# Patient Record
Sex: MALE | Race: WHITE | Employment: FULL TIME | URBAN - METROPOLITAN AREA
[De-identification: names, ages, dates, MRNs, and addresses within clinical notes are randomized per-mention and may not be internally consistent; named-entity substitution may affect disease eponyms.]

---

## 2017-01-13 RX ORDER — LEVOTHYROXINE SODIUM 112 UG/1
TABLET ORAL
Qty: 30 TABLET | Refills: 3 | Status: SHIPPED | OUTPATIENT
Start: 2017-01-13 | End: 2017-05-10 | Stop reason: SDUPTHER

## 2017-01-13 RX ORDER — METOPROLOL SUCCINATE 50 MG/1
TABLET, EXTENDED RELEASE ORAL
Qty: 30 TABLET | Refills: 0 | Status: SHIPPED | OUTPATIENT
Start: 2017-01-13 | End: 2017-02-15 | Stop reason: SDUPTHER

## 2017-02-16 RX ORDER — METOPROLOL SUCCINATE 50 MG/1
TABLET, EXTENDED RELEASE ORAL
Qty: 30 TABLET | Refills: 5 | Status: SHIPPED | OUTPATIENT
Start: 2017-02-16 | End: 2017-08-08 | Stop reason: SDUPTHER

## 2017-03-13 DIAGNOSIS — E78.00 PURE HYPERCHOLESTEROLEMIA: ICD-10-CM

## 2017-03-14 RX ORDER — ATORVASTATIN CALCIUM 20 MG/1
TABLET, FILM COATED ORAL
Qty: 30 TABLET | Refills: 5 | Status: SHIPPED | OUTPATIENT
Start: 2017-03-14 | End: 2017-09-11 | Stop reason: SDUPTHER

## 2017-05-12 RX ORDER — LEVOTHYROXINE SODIUM 112 UG/1
TABLET ORAL
Qty: 30 TABLET | Refills: 2 | Status: SHIPPED | OUTPATIENT
Start: 2017-05-12 | End: 2017-08-08 | Stop reason: SDUPTHER

## 2017-06-05 LAB
CHOLESTEROL, TOTAL: 151 MG/DL
CHOLESTEROL/HDL RATIO: 4.7
HDLC SERPL-MCNC: 32 MG/DL (ref 35–70)
LDL CHOLESTEROL CALCULATED: 105 MG/DL (ref 0–160)
PROSTATE SPECIFIC ANTIGEN: 2.45 NG/ML
TRIGL SERPL-MCNC: 71 MG/DL
TSH SERPL DL<=0.05 MIU/L-ACNC: 1.17 UIU/ML
VLDLC SERPL CALC-MCNC: ABNORMAL MG/DL

## 2017-06-06 DIAGNOSIS — E78.00 PURE HYPERCHOLESTEROLEMIA: ICD-10-CM

## 2017-06-06 DIAGNOSIS — E03.9 ACQUIRED HYPOTHYROIDISM: ICD-10-CM

## 2017-06-06 DIAGNOSIS — Z13.9 SCREENING: ICD-10-CM

## 2017-06-09 ENCOUNTER — OFFICE VISIT (OUTPATIENT)
Dept: INTERNAL MEDICINE CLINIC | Age: 62
End: 2017-06-09
Payer: COMMERCIAL

## 2017-06-09 VITALS
HEART RATE: 60 BPM | OXYGEN SATURATION: 98 % | DIASTOLIC BLOOD PRESSURE: 70 MMHG | SYSTOLIC BLOOD PRESSURE: 120 MMHG | BODY MASS INDEX: 28.75 KG/M2 | RESPIRATION RATE: 16 BRPM | HEIGHT: 75 IN | WEIGHT: 231.2 LBS

## 2017-06-09 DIAGNOSIS — I10 ESSENTIAL HYPERTENSION: ICD-10-CM

## 2017-06-09 DIAGNOSIS — E78.49 OTHER HYPERLIPIDEMIA: ICD-10-CM

## 2017-06-09 DIAGNOSIS — B34.9 VIRAL SYNDROME: Primary | ICD-10-CM

## 2017-06-09 DIAGNOSIS — E29.1 HYPOGONADISM IN MALE: ICD-10-CM

## 2017-06-09 DIAGNOSIS — Z12.11 COLON CANCER SCREENING: ICD-10-CM

## 2017-06-09 DIAGNOSIS — I83.813 VARICOSE VEINS OF BOTH LOWER EXTREMITIES WITH PAIN: ICD-10-CM

## 2017-06-09 LAB
CONTROL: NORMAL
HEMOCCULT STL QL: NEGATIVE

## 2017-06-09 PROCEDURE — 82274 ASSAY TEST FOR BLOOD FECAL: CPT | Performed by: FAMILY MEDICINE

## 2017-06-09 PROCEDURE — 99214 OFFICE O/P EST MOD 30 MIN: CPT | Performed by: FAMILY MEDICINE

## 2017-06-09 ASSESSMENT — ENCOUNTER SYMPTOMS
RESPIRATORY NEGATIVE: 1
ALLERGIC/IMMUNOLOGIC NEGATIVE: 1
EYES NEGATIVE: 1

## 2017-06-21 RX ORDER — VALSARTAN AND HYDROCHLOROTHIAZIDE 160; 12.5 MG/1; MG/1
TABLET, FILM COATED ORAL
Qty: 30 TABLET | Refills: 5 | Status: SHIPPED | OUTPATIENT
Start: 2017-06-21 | End: 2017-12-14 | Stop reason: SDUPTHER

## 2017-07-12 ENCOUNTER — OFFICE VISIT (OUTPATIENT)
Dept: SURGERY | Age: 62
End: 2017-07-12
Payer: COMMERCIAL

## 2017-07-12 VITALS
WEIGHT: 225.6 LBS | BODY MASS INDEX: 28.05 KG/M2 | TEMPERATURE: 96.8 F | HEART RATE: 61 BPM | HEIGHT: 75 IN | DIASTOLIC BLOOD PRESSURE: 84 MMHG | SYSTOLIC BLOOD PRESSURE: 127 MMHG

## 2017-07-12 DIAGNOSIS — I83.93 VARICOSE VEINS OF BOTH LOWER EXTREMITIES: Primary | ICD-10-CM

## 2017-07-12 PROCEDURE — 99203 OFFICE O/P NEW LOW 30 MIN: CPT | Performed by: SURGERY

## 2017-07-13 DIAGNOSIS — I10 ESSENTIAL HYPERTENSION: ICD-10-CM

## 2017-07-13 RX ORDER — POTASSIUM CHLORIDE 750 MG/1
TABLET, FILM COATED, EXTENDED RELEASE ORAL
Qty: 60 TABLET | Refills: 0 | Status: SHIPPED | OUTPATIENT
Start: 2017-07-13 | End: 2017-09-11 | Stop reason: SDUPTHER

## 2017-08-08 RX ORDER — LEVOTHYROXINE SODIUM 112 UG/1
TABLET ORAL
Qty: 30 TABLET | Refills: 2 | Status: SHIPPED | OUTPATIENT
Start: 2017-08-08 | End: 2017-11-10 | Stop reason: SDUPTHER

## 2017-08-08 RX ORDER — METOPROLOL SUCCINATE 50 MG/1
TABLET, EXTENDED RELEASE ORAL
Qty: 30 TABLET | Refills: 2 | Status: SHIPPED | OUTPATIENT
Start: 2017-08-08 | End: 2017-11-10 | Stop reason: SDUPTHER

## 2017-09-11 DIAGNOSIS — I10 ESSENTIAL HYPERTENSION: ICD-10-CM

## 2017-09-11 DIAGNOSIS — E78.00 PURE HYPERCHOLESTEROLEMIA: ICD-10-CM

## 2017-09-11 RX ORDER — ATORVASTATIN CALCIUM 20 MG/1
TABLET, FILM COATED ORAL
Qty: 30 TABLET | Refills: 3 | Status: SHIPPED | OUTPATIENT
Start: 2017-09-11 | End: 2018-01-12 | Stop reason: SDUPTHER

## 2017-09-11 RX ORDER — POTASSIUM CHLORIDE 750 MG/1
TABLET, FILM COATED, EXTENDED RELEASE ORAL
Qty: 60 TABLET | Refills: 3 | Status: SHIPPED | OUTPATIENT
Start: 2017-09-11 | End: 2018-02-05 | Stop reason: SDUPTHER

## 2017-10-11 ENCOUNTER — OFFICE VISIT (OUTPATIENT)
Dept: INTERNAL MEDICINE CLINIC | Age: 62
End: 2017-10-11
Payer: COMMERCIAL

## 2017-10-11 VITALS
BODY MASS INDEX: 28.03 KG/M2 | HEIGHT: 75 IN | TEMPERATURE: 97.6 F | HEART RATE: 72 BPM | SYSTOLIC BLOOD PRESSURE: 116 MMHG | DIASTOLIC BLOOD PRESSURE: 64 MMHG | WEIGHT: 225.4 LBS | RESPIRATION RATE: 16 BRPM

## 2017-10-11 DIAGNOSIS — I87.2 VENOUS INSUFFICIENCY: ICD-10-CM

## 2017-10-11 DIAGNOSIS — E03.9 ACQUIRED HYPOTHYROIDISM: ICD-10-CM

## 2017-10-11 DIAGNOSIS — I83.893 VARICOSE VEINS OF BOTH LEGS WITH EDEMA: ICD-10-CM

## 2017-10-11 DIAGNOSIS — E29.1 HYPOGONADISM IN MALE: ICD-10-CM

## 2017-10-11 DIAGNOSIS — I10 ESSENTIAL HYPERTENSION: ICD-10-CM

## 2017-10-11 DIAGNOSIS — R60.0 LOCALIZED EDEMA: Primary | ICD-10-CM

## 2017-10-11 DIAGNOSIS — E78.00 PURE HYPERCHOLESTEROLEMIA: ICD-10-CM

## 2017-10-11 PROCEDURE — 99214 OFFICE O/P EST MOD 30 MIN: CPT | Performed by: FAMILY MEDICINE

## 2017-10-11 RX ORDER — FUROSEMIDE 20 MG/1
20 TABLET ORAL DAILY
Qty: 5 TABLET | Refills: 0 | Status: SHIPPED | OUTPATIENT
Start: 2017-10-11 | End: 2018-08-14 | Stop reason: CLARIF

## 2017-10-11 ASSESSMENT — ENCOUNTER SYMPTOMS
CHEST TIGHTNESS: 0
SHORTNESS OF BREATH: 0
ANAL BLEEDING: 0
COUGH: 0
WHEEZING: 0
COLOR CHANGE: 0
CHOKING: 0
STRIDOR: 0
APNEA: 0
CONSTIPATION: 0
TROUBLE SWALLOWING: 0
BACK PAIN: 0
FACIAL SWELLING: 0
SORE THROAT: 0
ABDOMINAL PAIN: 0
ABDOMINAL DISTENTION: 0
EYE DISCHARGE: 0
EYE PAIN: 0
EYE REDNESS: 0

## 2017-10-11 NOTE — PROGRESS NOTES
[address] [place] [address] ADULT PRIMARY CARE  [address] [place] [address] [facility]  [address] [place] [address]  [id number]  Dept: [phone]  Dept Fax: [phone]    Cristiane Bah is a 58 y.o. male who presents today for his medical conditions/complaints as noted below. Cristiane Bah is c/o of   Chief Complaint   Patient presents with    Leg Swelling     left leg swelling since meds were changed by pharmacy was on Diovan HCT and switched to valsartan HCT.y    Other     the med switching occurred in Feb 2017    Other     frustruated about difficulty getting in to see Dr Ethan Christianson       HPI:     HPI     This patient is a 59 yo white male with hypertension. Recently, he has noted bilateral pedal edema. This has been present for over 7 months. He has had no symptoms of CHF. He has a history of normal kidney function. He does have significant, bilateral varicose veins. I will get a BNP and BMP. I will also order a venous doppler study and refer him over to vascular. Hemoglobin A1C (%)   Date Value   12/07/2016 5.6             ( goal A1C is < 7)   No results found for: LABMICR  LDL Calculated (mg/dL)   Date Value   06/05/2017 105   12/04/2015 135       (goal LDL is <100)   No results found for: AST, ALT, BUN  BP Readings from Last 3 Encounters:   10/11/17 116/64   07/12/17 127/84   06/09/17 120/70          (goal 120/80)    Past Medical History:   Diagnosis Date    Hyperlipidemia     Hypertension     Thyroid disease       History reviewed. No pertinent surgical history. History reviewed. No pertinent family history.     Social History   Substance Use Topics    Smoking status: Former Smoker    Smokeless tobacco: Never Used    Alcohol use Yes      Comment: moderate      Current Outpatient Prescriptions   Medication Sig Dispense Refill    furosemide (LASIX) 20 MG tablet Take 1 tablet by mouth daily 5 tablet 0    atorvastatin (LIPITOR) 20 MG tablet TAKE 1 TABLET BY MOUTH DAILY 30 tablet 3    potassium chloride (KLOR-CON) 10 MEQ extended release tablet TAKE 2 TABLETS BY MOUTH DAILY (Patient taking differently: takes 1 daily) 60 tablet 3    metoprolol succinate (TOPROL XL) 50 MG extended release tablet TAKE 1 TABLET BY MOUTH DAILY 30 tablet 2    levothyroxine (SYNTHROID) 112 MCG tablet TAKE 1 TABLET BY MOUTH DAILY 30 tablet 2    valsartan-hydrochlorothiazide (DIOVAN-HCT) 160-12.5 MG per tablet TAKE 1 TABLET BY MOUTH DAILY 30 tablet 5    sildenafil (VIAGRA) 100 MG tablet Take 1 tablet by mouth as needed for Erectile Dysfunction 30 tablet 3     No current facility-administered medications for this visit. No Known Allergies    Health Maintenance   Topic Date Due    Hepatitis C screen  1955    HIV screen  07/28/1970    Zostavax vaccine  07/28/2015    Flu vaccine (1) 12/20/2017 (Originally 9/1/2017)    Colon Cancer Screen FIT/FOBT  06/09/2018    Diabetes screen  12/07/2019    Lipid screen  06/05/2022    DTaP/Tdap/Td vaccine (2 - Td) 06/03/2026       Subjective:      Review of Systems   Constitutional: Negative for activity change, appetite change, chills, diaphoresis, fatigue, fever and unexpected weight change. HENT: Negative for ear discharge, facial swelling, hearing loss, nosebleeds, postnasal drip, sneezing, sore throat, tinnitus and trouble swallowing. Eyes: Negative for pain, discharge and redness. Respiratory: Negative for apnea, cough, choking, chest tightness, shortness of breath, wheezing and stridor. Cardiovascular: Positive for leg swelling. Negative for chest pain and palpitations. Gastrointestinal: Negative for abdominal distention, abdominal pain, anal bleeding and constipation. Endocrine: Negative for cold intolerance, heat intolerance, polydipsia and polyphagia. Genitourinary: Negative for difficulty urinating, flank pain and frequency. Musculoskeletal: Negative for arthralgias, back pain and neck pain. Skin: Negative for color change and rash.    Neurological: Negative for dizziness, seizures, numbness and headaches. Hematological: Negative for adenopathy. Does not bruise/bleed easily. Psychiatric/Behavioral: Negative for behavioral problems and confusion. Objective:     Physical Exam   Constitutional: He is oriented to person, place, and time. He appears well-developed and well-nourished. HENT:   Head: Normocephalic and atraumatic. Right Ear: External ear normal.   Left Ear: External ear normal.   Nose: Nose normal.   Mouth/Throat: Oropharynx is clear and moist.   Eyes: Conjunctivae and EOM are normal. Pupils are equal, round, and reactive to light. Right eye exhibits no discharge. Left eye exhibits no discharge. No scleral icterus. Neck: Normal range of motion. Neck supple. No tracheal deviation present. No thyromegaly present. Cardiovascular: Normal rate and regular rhythm. Exam reveals no gallop and no friction rub. No murmur heard. Pulmonary/Chest: Effort normal and breath sounds normal. No respiratory distress. He has no wheezes. He has no rales. Abdominal: Soft. Bowel sounds are normal. He exhibits no distension and no mass. There is no tenderness. There is no rebound and no guarding. Musculoskeletal: Normal range of motion. Neurological: He is alert and oriented to person, place, and time. Skin: Skin is warm and dry. Psychiatric: He has a normal mood and affect. His behavior is normal. Judgment and thought content normal.   Vitals reviewed. /64 (Site: Right Arm, Position: Sitting, Cuff Size: Medium Adult)   Pulse 72   Temp 97.6 °F (36.4 °C) (Oral)   Resp 16   Ht 6' 3\" (1.905 m)   Wt 225 lb 6.4 oz (102.2 kg)   BMI 28.17 kg/m²     Assessment:      1. Localized edema  50659 - AL Duplex Extrem Venous, Bilat    Brain Natriuretic Peptide    Basic Metabolic Panel    Arminda Tee M.D. - Ray Barbour MD    furosemide (LASIX) 20 MG tablet   2. Pure hypercholesterolemia     3. Essential hypertension     4.  Acquired

## 2017-10-11 NOTE — PROGRESS NOTES
Visit Information    Have you changed or started any medications since your last visit including any over-the-counter medicines, vitamins, or herbal medicines? no   Are you having any side effects from any of your medications? -  See notes  Have you stopped taking any of your medications? Is so, why? -  no    Have you seen any other physician or provider since your last visit? No  Have you had any other diagnostic tests since your last visit? No  Have you been seen in the emergency room and/or had an admission to a hospital since we last saw you? No  Have you had your routine dental cleaning in the past 6 months? yes -     Have you activated your [employer] account? If not, what are your barriers?  No:      Patient Care Team:  Johnna Moses MD as PCP - General (Family Medicine)  Jeevan Ramsey MD as Consulting Physician (Gastroenterology)    Medical History Review  Past Medical, Family, and Social History reviewed and does contribute to the patient presenting condition    Health Maintenance   Topic Date Due    Hepatitis C screen  1955    HIV screen  07/28/1970    Zostavax vaccine  07/28/2015    Flu vaccine (1) 12/20/2017 (Originally 9/1/2017)    Colon Cancer Screen FIT/FOBT  06/09/2018    Diabetes screen  12/07/2019    Lipid screen  06/05/2022    DTaP/Tdap/Td vaccine (2 - Td) 06/03/2026

## 2017-10-16 ENCOUNTER — HOSPITAL ENCOUNTER (OUTPATIENT)
Dept: VASCULAR LAB | Age: 62
Discharge: HOME | End: 2017-10-16
Payer: COMMERCIAL

## 2017-10-16 DIAGNOSIS — I83.893 VARICOSE VEINS OF BOTH LEGS WITH EDEMA: ICD-10-CM

## 2017-10-16 DIAGNOSIS — R60.0 LOCALIZED EDEMA: Primary | ICD-10-CM

## 2017-10-16 DIAGNOSIS — I87.2 VENOUS INSUFFICIENCY: ICD-10-CM

## 2017-10-16 PROCEDURE — 93970 EXTREMITY STUDY: CPT

## 2017-11-10 RX ORDER — METOPROLOL SUCCINATE 50 MG/1
TABLET, EXTENDED RELEASE ORAL
Qty: 30 TABLET | Refills: 2 | Status: SHIPPED | OUTPATIENT
Start: 2017-11-10 | End: 2018-02-12 | Stop reason: SDUPTHER

## 2017-11-10 RX ORDER — LEVOTHYROXINE SODIUM 112 UG/1
TABLET ORAL
Qty: 30 TABLET | Refills: 2 | Status: SHIPPED | OUTPATIENT
Start: 2017-11-10 | End: 2018-02-12 | Stop reason: SDUPTHER

## 2017-12-14 RX ORDER — VALSARTAN AND HYDROCHLOROTHIAZIDE 160; 12.5 MG/1; MG/1
TABLET, FILM COATED ORAL
Qty: 30 TABLET | Refills: 2 | Status: SHIPPED | OUTPATIENT
Start: 2017-12-14 | End: 2018-03-13 | Stop reason: SDUPTHER

## 2018-01-12 DIAGNOSIS — E78.00 PURE HYPERCHOLESTEROLEMIA: ICD-10-CM

## 2018-01-12 RX ORDER — ATORVASTATIN CALCIUM 20 MG/1
20 TABLET, FILM COATED ORAL DAILY
Qty: 30 TABLET | Refills: 3 | Status: SHIPPED | OUTPATIENT
Start: 2018-01-12 | End: 2018-05-16 | Stop reason: SDUPTHER

## 2018-02-05 DIAGNOSIS — I10 ESSENTIAL HYPERTENSION: ICD-10-CM

## 2018-02-05 RX ORDER — POTASSIUM CHLORIDE 750 MG/1
TABLET, FILM COATED, EXTENDED RELEASE ORAL
Qty: 30 TABLET | Refills: 0 | Status: SHIPPED | OUTPATIENT
Start: 2018-02-05 | End: 2018-03-13 | Stop reason: SDUPTHER

## 2018-02-12 RX ORDER — LEVOTHYROXINE SODIUM 112 UG/1
TABLET ORAL
Qty: 30 TABLET | Refills: 1 | Status: SHIPPED | OUTPATIENT
Start: 2018-02-12 | End: 2018-03-13 | Stop reason: SDUPTHER

## 2018-02-12 RX ORDER — METOPROLOL SUCCINATE 50 MG/1
TABLET, EXTENDED RELEASE ORAL
Qty: 30 TABLET | Refills: 1 | Status: SHIPPED | OUTPATIENT
Start: 2018-02-12 | End: 2018-03-13 | Stop reason: SDUPTHER

## 2018-03-13 DIAGNOSIS — I10 ESSENTIAL HYPERTENSION: ICD-10-CM

## 2018-03-13 RX ORDER — LEVOTHYROXINE SODIUM 112 UG/1
TABLET ORAL
Qty: 30 TABLET | Refills: 1 | Status: SHIPPED | OUTPATIENT
Start: 2018-03-13 | End: 2018-05-07 | Stop reason: SDUPTHER

## 2018-03-13 RX ORDER — METOPROLOL SUCCINATE 50 MG/1
TABLET, EXTENDED RELEASE ORAL
Qty: 30 TABLET | Refills: 0 | Status: SHIPPED | OUTPATIENT
Start: 2018-03-13 | End: 2018-05-17 | Stop reason: SDUPTHER

## 2018-03-13 RX ORDER — POTASSIUM CHLORIDE 750 MG/1
TABLET, FILM COATED, EXTENDED RELEASE ORAL
Qty: 30 TABLET | Refills: 0 | Status: SHIPPED | OUTPATIENT
Start: 2018-03-13 | End: 2018-04-16 | Stop reason: SDUPTHER

## 2018-03-13 RX ORDER — VALSARTAN AND HYDROCHLOROTHIAZIDE 160; 12.5 MG/1; MG/1
TABLET, FILM COATED ORAL
Qty: 30 TABLET | Refills: 2 | Status: SHIPPED | OUTPATIENT
Start: 2018-03-13 | End: 2018-06-16 | Stop reason: SDUPTHER

## 2018-04-11 ENCOUNTER — OFFICE VISIT (OUTPATIENT)
Dept: INTERNAL MEDICINE CLINIC | Age: 63
End: 2018-04-11
Payer: COMMERCIAL

## 2018-04-11 VITALS
RESPIRATION RATE: 16 BRPM | OXYGEN SATURATION: 95 % | TEMPERATURE: 97.9 F | WEIGHT: 234.6 LBS | DIASTOLIC BLOOD PRESSURE: 70 MMHG | HEIGHT: 75 IN | BODY MASS INDEX: 29.17 KG/M2 | SYSTOLIC BLOOD PRESSURE: 118 MMHG | HEART RATE: 74 BPM

## 2018-04-11 DIAGNOSIS — M79.89 LEG SWELLING: Primary | ICD-10-CM

## 2018-04-11 DIAGNOSIS — I10 ESSENTIAL HYPERTENSION: ICD-10-CM

## 2018-04-11 DIAGNOSIS — E03.9 ACQUIRED HYPOTHYROIDISM: ICD-10-CM

## 2018-04-11 PROCEDURE — 99214 OFFICE O/P EST MOD 30 MIN: CPT | Performed by: INTERNAL MEDICINE

## 2018-04-11 ASSESSMENT — PATIENT HEALTH QUESTIONNAIRE - PHQ9
1. LITTLE INTEREST OR PLEASURE IN DOING THINGS: 0
2. FEELING DOWN, DEPRESSED OR HOPELESS: 0
SUM OF ALL RESPONSES TO PHQ QUESTIONS 1-9: 0
SUM OF ALL RESPONSES TO PHQ9 QUESTIONS 1 & 2: 0

## 2018-04-16 DIAGNOSIS — I10 ESSENTIAL HYPERTENSION: ICD-10-CM

## 2018-04-16 RX ORDER — POTASSIUM CHLORIDE 750 MG/1
TABLET, FILM COATED, EXTENDED RELEASE ORAL
Qty: 30 TABLET | Refills: 5 | Status: SHIPPED | OUTPATIENT
Start: 2018-04-16 | End: 2018-04-17 | Stop reason: SDUPTHER

## 2018-04-17 DIAGNOSIS — I10 ESSENTIAL HYPERTENSION: ICD-10-CM

## 2018-04-17 RX ORDER — POTASSIUM CHLORIDE 750 MG/1
TABLET, FILM COATED, EXTENDED RELEASE ORAL
Qty: 60 TABLET | Refills: 5 | Status: SHIPPED | OUTPATIENT
Start: 2018-04-17 | End: 2018-08-14 | Stop reason: SDUPTHER

## 2018-05-07 RX ORDER — LEVOTHYROXINE SODIUM 112 UG/1
TABLET ORAL
Qty: 30 TABLET | Refills: 2 | Status: SHIPPED | OUTPATIENT
Start: 2018-05-07 | End: 2018-08-14 | Stop reason: SDUPTHER

## 2018-05-16 DIAGNOSIS — E78.00 PURE HYPERCHOLESTEROLEMIA: ICD-10-CM

## 2018-05-16 RX ORDER — ATORVASTATIN CALCIUM 20 MG/1
20 TABLET, FILM COATED ORAL DAILY
Qty: 30 TABLET | Refills: 5 | Status: SHIPPED | OUTPATIENT
Start: 2018-05-16 | End: 2018-08-14 | Stop reason: SDUPTHER

## 2018-05-16 RX ORDER — ATORVASTATIN CALCIUM 20 MG/1
20 TABLET, FILM COATED ORAL DAILY
Qty: 30 TABLET | Refills: 3 | OUTPATIENT
Start: 2018-05-16

## 2018-05-18 RX ORDER — METOPROLOL SUCCINATE 50 MG/1
TABLET, EXTENDED RELEASE ORAL
Qty: 30 TABLET | Refills: 5 | Status: SHIPPED | OUTPATIENT
Start: 2018-05-18 | End: 2018-08-14 | Stop reason: SDUPTHER

## 2018-06-12 RX ORDER — LEVOTHYROXINE SODIUM 112 UG/1
TABLET ORAL
Qty: 30 TABLET | Refills: 2 | Status: SHIPPED | OUTPATIENT
Start: 2018-06-12 | End: 2018-08-14 | Stop reason: CLARIF

## 2018-06-18 RX ORDER — VALSARTAN AND HYDROCHLOROTHIAZIDE 160; 12.5 MG/1; MG/1
TABLET, FILM COATED ORAL
Qty: 30 TABLET | Refills: 4 | Status: SHIPPED | OUTPATIENT
Start: 2018-06-18 | End: 2018-08-14 | Stop reason: SDUPTHER

## 2018-08-14 ENCOUNTER — OFFICE VISIT (OUTPATIENT)
Dept: INTERNAL MEDICINE CLINIC | Age: 63
End: 2018-08-14
Payer: COMMERCIAL

## 2018-08-14 VITALS
DIASTOLIC BLOOD PRESSURE: 72 MMHG | SYSTOLIC BLOOD PRESSURE: 110 MMHG | HEART RATE: 60 BPM | WEIGHT: 230 LBS | TEMPERATURE: 97.6 F | BODY MASS INDEX: 28.6 KG/M2 | RESPIRATION RATE: 16 BRPM | HEIGHT: 75 IN

## 2018-08-14 DIAGNOSIS — I10 ESSENTIAL HYPERTENSION: ICD-10-CM

## 2018-08-14 DIAGNOSIS — E03.9 ACQUIRED HYPOTHYROIDISM: ICD-10-CM

## 2018-08-14 DIAGNOSIS — H00.011 HORDEOLUM EXTERNUM OF RIGHT UPPER EYELID: Primary | ICD-10-CM

## 2018-08-14 PROCEDURE — 99214 OFFICE O/P EST MOD 30 MIN: CPT | Performed by: INTERNAL MEDICINE

## 2018-08-14 RX ORDER — POTASSIUM CHLORIDE 750 MG/1
TABLET, FILM COATED, EXTENDED RELEASE ORAL
Qty: 60 TABLET | Refills: 5 | Status: SHIPPED | OUTPATIENT
Start: 2018-08-14 | End: 2019-02-06 | Stop reason: SDUPTHER

## 2018-08-14 RX ORDER — ATORVASTATIN CALCIUM 20 MG/1
20 TABLET, FILM COATED ORAL DAILY
Qty: 30 TABLET | Refills: 5 | Status: SHIPPED | OUTPATIENT
Start: 2018-08-14 | End: 2019-02-06 | Stop reason: SDUPTHER

## 2018-08-14 RX ORDER — TOBRAMYCIN 3 MG/ML
1 SOLUTION/ DROPS OPHTHALMIC EVERY 4 HOURS
Qty: 1 BOTTLE | Refills: 0 | Status: SHIPPED | OUTPATIENT
Start: 2018-08-14 | End: 2018-09-05 | Stop reason: SDUPTHER

## 2018-08-14 RX ORDER — VALSARTAN AND HYDROCHLOROTHIAZIDE 160; 12.5 MG/1; MG/1
TABLET, FILM COATED ORAL
Qty: 30 TABLET | Refills: 4 | Status: SHIPPED | OUTPATIENT
Start: 2018-08-14 | End: 2019-02-06 | Stop reason: SDUPTHER

## 2018-08-14 RX ORDER — CEPHALEXIN 500 MG/1
500 CAPSULE ORAL 3 TIMES DAILY
Qty: 21 CAPSULE | Refills: 0 | Status: SHIPPED | OUTPATIENT
Start: 2018-08-14 | End: 2018-08-21

## 2018-08-14 RX ORDER — LEVOTHYROXINE SODIUM 112 UG/1
TABLET ORAL
Qty: 30 TABLET | Refills: 5 | Status: SHIPPED | OUTPATIENT
Start: 2018-08-14 | End: 2019-02-06 | Stop reason: SDUPTHER

## 2018-08-14 RX ORDER — METOPROLOL SUCCINATE 50 MG/1
TABLET, EXTENDED RELEASE ORAL
Qty: 30 TABLET | Refills: 5 | Status: SHIPPED | OUTPATIENT
Start: 2018-08-14 | End: 2019-02-06 | Stop reason: SDUPTHER

## 2018-08-14 ASSESSMENT — PATIENT HEALTH QUESTIONNAIRE - PHQ9
SUM OF ALL RESPONSES TO PHQ QUESTIONS 1-9: 0
SUM OF ALL RESPONSES TO PHQ9 QUESTIONS 1 & 2: 0
1. LITTLE INTEREST OR PLEASURE IN DOING THINGS: 0
SUM OF ALL RESPONSES TO PHQ QUESTIONS 1-9: 0
2. FEELING DOWN, DEPRESSED OR HOPELESS: 0

## 2018-08-14 NOTE — PROGRESS NOTES
Lakshmi Sheikh is a 61 y.o. male who presents for   Chief Complaint   Patient presents with    Eye Problem     co bumps on inside of eye lid of right eye for 1 week- irritating    and follow up of chronic medical problems. Patient Active Problem List   Diagnosis    Hyperlipidemia    Hypertension    Hypothyroidism    Epistaxis    Nevus    Basal cell carcinoma of nose    Hypogonadism in male    Localized edema     HPI  Here for follow-up on blood pressure and also wants to check lump on the right eyelid has been there for 1 week    Current Outpatient Prescriptions   Medication Sig Dispense Refill    atorvastatin (LIPITOR) 20 MG tablet Take 1 tablet by mouth daily 30 tablet 5    levothyroxine (SYNTHROID) 112 MCG tablet TAKE 1 TABLET BY MOUTH DAILY 30 tablet 5    metoprolol succinate (TOPROL XL) 50 MG extended release tablet TAKE 1 TABLET BY MOUTH DAILY 30 tablet 5    potassium chloride (KLOR-CON) 10 MEQ extended release tablet TAKE 2 TABLETS BY MOUTH DAILY 60 tablet 5    valsartan-hydrochlorothiazide (DIOVAN-HCT) 160-12.5 MG per tablet TAKE 1 TABLET BY MOUTH DAILY 30 tablet 4    tobramycin (TOBREX) 0.3 % ophthalmic solution Place 1 drop into the right eye every 4 hours for 10 days 1 Bottle 0    cephALEXin (KEFLEX) 500 MG capsule Take 1 capsule by mouth 3 times daily for 7 days 21 capsule 0    sildenafil (VIAGRA) 100 MG tablet Take 1 tablet by mouth as needed for Erectile Dysfunction 30 tablet 3    Compression Stockings MISC by Does not apply route 20-30 mmHg calf length 1 each 0     No current facility-administered medications for this visit. No Known Allergies    Past Medical History:   Diagnosis Date    Hyperlipidemia     Hypertension     Thyroid disease        No past surgical history on file. No family history on file.   ROS   Constitutional:  Negative for fatigue, loss of appetite and unexpected weight change   HEENT            : Negative for neck stiffness and pain, no congestion Refill:  5    potassium chloride (KLOR-CON) 10 MEQ extended release tablet     Sig: TAKE 2 TABLETS BY MOUTH DAILY     Dispense:  60 tablet     Refill:  5    valsartan-hydrochlorothiazide (DIOVAN-HCT) 160-12.5 MG per tablet     Sig: TAKE 1 TABLET BY MOUTH DAILY     Dispense:  30 tablet     Refill:  4    tobramycin (TOBREX) 0.3 % ophthalmic solution     Sig: Place 1 drop into the right eye every 4 hours for 10 days     Dispense:  1 Bottle     Refill:  0    cephALEXin (KEFLEX) 500 MG capsule     Sig: Take 1 capsule by mouth 3 times daily for 7 days     Dispense:  21 capsule     Refill:  0          Completed Refills               Requested Prescriptions     Signed Prescriptions Disp Refills    atorvastatin (LIPITOR) 20 MG tablet 30 tablet 5     Sig: Take 1 tablet by mouth daily    levothyroxine (SYNTHROID) 112 MCG tablet 30 tablet 5     Sig: TAKE 1 TABLET BY MOUTH DAILY    metoprolol succinate (TOPROL XL) 50 MG extended release tablet 30 tablet 5     Sig: TAKE 1 TABLET BY MOUTH DAILY    potassium chloride (KLOR-CON) 10 MEQ extended release tablet 60 tablet 5     Sig: TAKE 2 TABLETS BY MOUTH DAILY    valsartan-hydrochlorothiazide (DIOVAN-HCT) 160-12.5 MG per tablet 30 tablet 4     Sig: TAKE 1 TABLET BY MOUTH DAILY    tobramycin (TOBREX) 0.3 % ophthalmic solution 1 Bottle 0     Sig: Place 1 drop into the right eye every 4 hours for 10 days    cephALEXin (KEFLEX) 500 MG capsule 21 capsule 0     Sig: Take 1 capsule by mouth 3 times daily for 7 days     4. Patient given educational materials - see patient instructions    5. Was a self-tracking handout given in paper form or via [employer]? NO    No orders of the defined types were placed in this encounter. No Follow-up on file. Patient voiced understanding and agreed to treatment plan.      Electronically signed by Jeison Carmona MD on 8/14/2018 at 9:47 AM    This note is created with a voice recognition program and while intend to generate a document

## 2018-08-14 NOTE — PROGRESS NOTES
Visit Information    Have you changed or started any medications since your last visit including any over-the-counter medicines, vitamins, or herbal medicines? no   Are you having any side effects from any of your medications? -  no  Have you stopped taking any of your medications? Is so, why? -  no    Have you seen any other physician or provider since your last visit? No  Have you had any other diagnostic tests since your last visit? No  Have you been seen in the emergency room and/or had an admission to a hospital since we last saw you? No  Have you had your routine dental cleaning in the past 6 months? yes -     Have you activated your [employer] account? If not, what are your barriers?  Yes     Patient Care Team:  Carol Cox MD as PCP - General (Internal Medicine)  Pam Garay MD as Consulting Physician (Gastroenterology)    Medical History Review  Past Medical, Family, and Social History reviewed and does contribute to the patient presenting condition    Health Maintenance   Topic Date Due    TSH testing  06/05/2018    Colon Cancer Screen FIT/FOBT  06/09/2018    Hepatitis C screen  04/20/2019 (Originally 1955)    HIV screen  04/24/2019 (Originally 7/28/1970)    Shingles Vaccine (1 of 2 - 2 Dose Series) 04/25/2019 (Originally 7/28/2005)    Creatinine monitoring  04/25/2019 (Originally 12/4/2016)    Potassium monitoring  04/26/2019 (Originally 12/4/2016)    Flu vaccine (1) 09/01/2018    Diabetes screen  12/07/2019    Lipid screen  06/05/2022    DTaP/Tdap/Td vaccine (2 - Td) 06/03/2026

## 2018-09-05 RX ORDER — LEVOTHYROXINE SODIUM 112 UG/1
TABLET ORAL
Qty: 30 TABLET | Refills: 5 | Status: SHIPPED | OUTPATIENT
Start: 2018-09-05 | End: 2019-02-06 | Stop reason: CLARIF

## 2018-09-05 RX ORDER — TOBRAMYCIN 3 MG/ML
SOLUTION/ DROPS OPHTHALMIC
Qty: 5 ML | Refills: 5 | Status: SHIPPED | OUTPATIENT
Start: 2018-09-05 | End: 2019-02-06 | Stop reason: CLARIF

## 2018-10-10 RX ORDER — VALSARTAN AND HYDROCHLOROTHIAZIDE 160; 12.5 MG/1; MG/1
TABLET, FILM COATED ORAL
Qty: 30 TABLET | Refills: 3 | Status: SHIPPED | OUTPATIENT
Start: 2018-10-10 | End: 2019-02-06 | Stop reason: CLARIF

## 2018-11-19 RX ORDER — METOPROLOL SUCCINATE 50 MG/1
TABLET, EXTENDED RELEASE ORAL
Qty: 30 TABLET | Refills: 3 | Status: SHIPPED | OUTPATIENT
Start: 2018-11-19 | End: 2019-02-06 | Stop reason: CLARIF

## 2019-02-06 ENCOUNTER — OFFICE VISIT (OUTPATIENT)
Dept: INTERNAL MEDICINE CLINIC | Age: 64
End: 2019-02-06
Payer: COMMERCIAL

## 2019-02-06 VITALS
HEIGHT: 75 IN | HEART RATE: 64 BPM | TEMPERATURE: 97.6 F | DIASTOLIC BLOOD PRESSURE: 64 MMHG | RESPIRATION RATE: 16 BRPM | SYSTOLIC BLOOD PRESSURE: 112 MMHG | BODY MASS INDEX: 28.3 KG/M2 | WEIGHT: 227.6 LBS

## 2019-02-06 DIAGNOSIS — E03.2 HYPOTHYROIDISM DUE TO MEDICATION: ICD-10-CM

## 2019-02-06 DIAGNOSIS — J02.9 SORE THROAT: Primary | ICD-10-CM

## 2019-02-06 DIAGNOSIS — E78.00 PURE HYPERCHOLESTEROLEMIA: ICD-10-CM

## 2019-02-06 DIAGNOSIS — I10 ESSENTIAL HYPERTENSION: ICD-10-CM

## 2019-02-06 PROCEDURE — 99214 OFFICE O/P EST MOD 30 MIN: CPT | Performed by: INTERNAL MEDICINE

## 2019-02-06 RX ORDER — LORATADINE 10 MG/1
10 TABLET ORAL DAILY
Qty: 30 TABLET | Refills: 0 | Status: SHIPPED | OUTPATIENT
Start: 2019-02-06 | End: 2019-06-06 | Stop reason: ALTCHOICE

## 2019-02-06 RX ORDER — METOPROLOL SUCCINATE 50 MG/1
TABLET, EXTENDED RELEASE ORAL
Qty: 30 TABLET | Refills: 5 | Status: SHIPPED | OUTPATIENT
Start: 2019-02-06 | End: 2019-09-23 | Stop reason: SDUPTHER

## 2019-02-06 RX ORDER — VALSARTAN AND HYDROCHLOROTHIAZIDE 160; 12.5 MG/1; MG/1
TABLET, FILM COATED ORAL
Qty: 30 TABLET | Refills: 4 | Status: SHIPPED | OUTPATIENT
Start: 2019-02-06 | End: 2019-07-10 | Stop reason: SDUPTHER

## 2019-02-06 RX ORDER — ATORVASTATIN CALCIUM 20 MG/1
20 TABLET, FILM COATED ORAL DAILY
Qty: 30 TABLET | OUTPATIENT
Start: 2019-02-06

## 2019-02-06 RX ORDER — POTASSIUM CHLORIDE 750 MG/1
TABLET, FILM COATED, EXTENDED RELEASE ORAL
Qty: 60 TABLET | Refills: 5 | Status: SHIPPED | OUTPATIENT
Start: 2019-02-06 | End: 2019-10-12 | Stop reason: SDUPTHER

## 2019-02-06 RX ORDER — ATORVASTATIN CALCIUM 20 MG/1
20 TABLET, FILM COATED ORAL DAILY
Qty: 30 TABLET | Refills: 5 | Status: SHIPPED | OUTPATIENT
Start: 2019-02-06 | End: 2019-08-03 | Stop reason: SDUPTHER

## 2019-02-06 RX ORDER — VALSARTAN AND HYDROCHLOROTHIAZIDE 160; 12.5 MG/1; MG/1
TABLET, FILM COATED ORAL
Qty: 30 TABLET | OUTPATIENT
Start: 2019-02-06

## 2019-02-06 RX ORDER — PANTOPRAZOLE SODIUM 40 MG/1
40 TABLET, DELAYED RELEASE ORAL
Qty: 30 TABLET | Refills: 0 | Status: SHIPPED | OUTPATIENT
Start: 2019-02-06 | End: 2019-06-06 | Stop reason: ALTCHOICE

## 2019-02-06 RX ORDER — LEVOTHYROXINE SODIUM 112 UG/1
TABLET ORAL
Qty: 30 TABLET | Refills: 5 | Status: SHIPPED | OUTPATIENT
Start: 2019-02-06 | End: 2019-06-06 | Stop reason: SDUPTHER

## 2019-02-06 ASSESSMENT — PATIENT HEALTH QUESTIONNAIRE - PHQ9
1. LITTLE INTEREST OR PLEASURE IN DOING THINGS: 0
SUM OF ALL RESPONSES TO PHQ QUESTIONS 1-9: 0
SUM OF ALL RESPONSES TO PHQ QUESTIONS 1-9: 0
SUM OF ALL RESPONSES TO PHQ9 QUESTIONS 1 & 2: 0
2. FEELING DOWN, DEPRESSED OR HOPELESS: 0

## 2019-02-08 ENCOUNTER — HOSPITAL ENCOUNTER (OUTPATIENT)
Age: 64
Setting detail: SPECIMEN
Discharge: HOME | End: 2019-02-08
Payer: COMMERCIAL

## 2019-02-08 DIAGNOSIS — J02.9 SORE THROAT: ICD-10-CM

## 2019-02-08 DIAGNOSIS — E03.2 HYPOTHYROIDISM DUE TO MEDICATION: ICD-10-CM

## 2019-02-08 DIAGNOSIS — I10 ESSENTIAL HYPERTENSION: ICD-10-CM

## 2019-02-08 DIAGNOSIS — E78.00 PURE HYPERCHOLESTEROLEMIA: ICD-10-CM

## 2019-02-08 LAB
ALBUMIN SERPL-MCNC: 4.2 G/DL (ref 3.5–5.2)
ALBUMIN/GLOBULIN RATIO: 1.8 (ref 1–2.5)
ALP BLD-CCNC: 77 U/L (ref 40–129)
ALT SERPL-CCNC: 15 U/L (ref 5–41)
ANION GAP SERPL CALCULATED.3IONS-SCNC: 11 MMOL/L (ref 9–17)
AST SERPL-CCNC: 16 U/L
BILIRUB SERPL-MCNC: 0.78 MG/DL (ref 0.3–1.2)
BUN BLDV-MCNC: 15 MG/DL (ref 8–23)
BUN/CREAT BLD: NORMAL (ref 9–20)
CALCIUM SERPL-MCNC: 9.3 MG/DL (ref 8.6–10.4)
CHLORIDE BLD-SCNC: 105 MMOL/L (ref 98–107)
CHOLESTEROL/HDL RATIO: 4.3
CHOLESTEROL: 146 MG/DL
CO2: 24 MMOL/L (ref 20–31)
CREAT SERPL-MCNC: 0.93 MG/DL (ref 0.7–1.2)
GFR AFRICAN AMERICAN: >60 ML/MIN
GFR NON-AFRICAN AMERICAN: >60 ML/MIN
GFR SERPL CREATININE-BSD FRML MDRD: NORMAL ML/MIN/{1.73_M2}
GFR SERPL CREATININE-BSD FRML MDRD: NORMAL ML/MIN/{1.73_M2}
GLUCOSE BLD-MCNC: 89 MG/DL (ref 70–99)
HCT VFR BLD CALC: 42.1 % (ref 40.7–50.3)
HDLC SERPL-MCNC: 34 MG/DL
HEMOGLOBIN: 14.1 G/DL (ref 13–17)
LDL CHOLESTEROL: 98 MG/DL (ref 0–130)
MCH RBC QN AUTO: 30.7 PG (ref 25.2–33.5)
MCHC RBC AUTO-ENTMCNC: 33.5 G/DL (ref 28.4–34.8)
MCV RBC AUTO: 91.7 FL (ref 82.6–102.9)
NRBC AUTOMATED: 0 PER 100 WBC
PDW BLD-RTO: 12.8 % (ref 11.8–14.4)
PLATELET # BLD: 227 K/UL (ref 138–453)
PMV BLD AUTO: 11.8 FL (ref 8.1–13.5)
POTASSIUM SERPL-SCNC: 4.4 MMOL/L (ref 3.7–5.3)
RBC # BLD: 4.59 M/UL (ref 4.21–5.77)
SODIUM BLD-SCNC: 140 MMOL/L (ref 135–144)
THYROXINE, FREE: 1.5 NG/DL (ref 0.93–1.7)
TOTAL PROTEIN: 6.6 G/DL (ref 6.4–8.3)
TRIGL SERPL-MCNC: 71 MG/DL
TSH SERPL DL<=0.05 MIU/L-ACNC: 0.93 MIU/L (ref 0.3–5)
VLDLC SERPL CALC-MCNC: ABNORMAL MG/DL (ref 1–30)
WBC # BLD: 5 K/UL (ref 3.5–11.3)

## 2019-02-20 ENCOUNTER — TELEPHONE (OUTPATIENT)
Dept: INTERNAL MEDICINE CLINIC | Age: 64
End: 2019-02-20

## 2019-03-08 RX ORDER — LEVOTHYROXINE SODIUM 112 UG/1
TABLET ORAL
Qty: 30 TABLET | Refills: 5 | Status: SHIPPED | OUTPATIENT
Start: 2019-03-08 | End: 2019-06-06

## 2019-03-28 RX ORDER — METOPROLOL SUCCINATE 50 MG/1
TABLET, EXTENDED RELEASE ORAL
Qty: 30 TABLET | Refills: 5 | OUTPATIENT
Start: 2019-03-28

## 2019-06-06 ENCOUNTER — OFFICE VISIT (OUTPATIENT)
Dept: INTERNAL MEDICINE CLINIC | Age: 64
End: 2019-06-06
Payer: COMMERCIAL

## 2019-06-06 VITALS
HEART RATE: 52 BPM | RESPIRATION RATE: 16 BRPM | TEMPERATURE: 97.9 F | BODY MASS INDEX: 28.23 KG/M2 | SYSTOLIC BLOOD PRESSURE: 112 MMHG | WEIGHT: 227 LBS | HEIGHT: 75 IN | DIASTOLIC BLOOD PRESSURE: 62 MMHG

## 2019-06-06 DIAGNOSIS — E03.2 HYPOTHYROIDISM DUE TO MEDICATION: ICD-10-CM

## 2019-06-06 DIAGNOSIS — K21.9 GASTROESOPHAGEAL REFLUX DISEASE WITHOUT ESOPHAGITIS: ICD-10-CM

## 2019-06-06 DIAGNOSIS — I10 ESSENTIAL HYPERTENSION: Primary | ICD-10-CM

## 2019-06-06 PROCEDURE — 99214 OFFICE O/P EST MOD 30 MIN: CPT | Performed by: INTERNAL MEDICINE

## 2019-06-06 RX ORDER — LEVOTHYROXINE SODIUM 112 UG/1
TABLET ORAL
Qty: 30 TABLET | Refills: 5 | Status: SHIPPED | OUTPATIENT
Start: 2019-06-06 | End: 2019-11-30 | Stop reason: SDUPTHER

## 2019-06-06 ASSESSMENT — PATIENT HEALTH QUESTIONNAIRE - PHQ9
SUM OF ALL RESPONSES TO PHQ9 QUESTIONS 1 & 2: 0
SUM OF ALL RESPONSES TO PHQ QUESTIONS 1-9: 0
2. FEELING DOWN, DEPRESSED OR HOPELESS: 0
SUM OF ALL RESPONSES TO PHQ QUESTIONS 1-9: 0
1. LITTLE INTEREST OR PLEASURE IN DOING THINGS: 0

## 2019-06-06 NOTE — PROGRESS NOTES
Visit Information    Have you changed or started any medications since your last visit including any over-the-counter medicines, vitamins, or herbal medicines? no   Are you having any side effects from any of your medications? -  no  Have you stopped taking any of your medications? Is so, why? -  yes - see notes    Have you seen any other physician or provider since your last visit? No  Have you had any other diagnostic tests since your last visit? Yes - Records Obtained  Have you been seen in the emergency room and/or had an admission to a hospital since we last saw you? No  Have you had your routine dental cleaning in the past 6 months? yes -     Have you activated your [employer] account? If not, what are your barriers?  No:      Patient Care Team:  Rodrigo Dacosta MD as PCP - General (Internal Medicine)  Rodrigo Dacosta MD as PCP - [facility] Provider  Nafisa Matos MD as Consulting Physician (Gastroenterology)    Medical History Review  Past Medical, Family, and Social History reviewed and does contribute to the patient presenting condition    Health Maintenance   Topic Date Due    Hepatitis C screen  1955    HIV screen  07/28/1970    Shingles Vaccine (1 of 2) 07/28/2005    Colon Cancer Screen FIT/FOBT  06/09/2018    Flu vaccine (Season Ended) 11/08/2021 (Originally 9/1/2019)    TSH testing  02/08/2020    Potassium monitoring  02/08/2020    Creatinine monitoring  02/08/2020    Lipid screen  02/08/2024    DTaP/Tdap/Td vaccine (2 - Td) 06/03/2026    Pneumococcal 0-64 years Vaccine  Aged Out

## 2019-06-06 NOTE — PROGRESS NOTES
Negative for cough, shortness of breath or wheezing   Gastrointestinal: Negative for abdominal pain, constipation or diarrhea and bloating No nausea or vomiting   Genitourinary:     No urgency or frequency, no burning or hematuria   Musculoskeletal: No arthralgias, back pain or myalgias   Skin                  : Negative for rash or erythema   Neurological    : Negative for dizziness, weakness, tremors ,light headedness or syncope   Psychiatric       : Negative for dysphoric mood, sleep disturbances, nervous or anxious, or decreased concentration   All other review of systems was negative    Objective  Physical Examination:    Nursing note reviewed    /62 (Site: Right Upper Arm, Position: Sitting, Cuff Size: Medium Adult)   Pulse 52   Temp 97.9 °F (36.6 °C) (Oral)   Resp 16   Ht 6' 3\" (1.905 m)   Wt 227 lb (103 kg)   BMI 28.37 kg/m²   BP Readings from Last 3 Encounters:   06/06/19 112/62   02/06/19 112/64   08/14/18 110/72         Constitutional:  Cori Iglesias is oriented to place, person and time ,appears well-developed and well-nourished  HEENT:  Atraumatic and normocephalic, external ears normal bilaterally, nose normal no oropharyngeal exudate and is clear and moist  Eyes:  EOCM normal; conjunctivae normal; PERRLA bilaterally  Neck:  Normal range of motion, neck supple, no JVD and no thyromegaly  Cardiovascular:  RRR, normal heart sounds and intact distal pulses  Pulmonary:  effort normal and breath sounds normal bilaterally,no wheezes or rales, no respiratory distress  Abdominal:  Soft, non-tender; normal bowel sounds, no masses  Musculoskeletal:  Normal range of motion and no edema or tenderness bilaterally  No lymphadenopathy  Neurological:  alert, oriented, and normal reflexes bilaterally  Skin: warm and dry  Psychiatric:  normal mood and effect; behavior normal.    Labs:   Lab Results   Component Value Date    LABA1C 5.6 12/07/2016     Lab Results   Component Value Date    CHOL 146 02/08/2019 Lab Results   Component Value Date    HDL 34 (L) 02/08/2019     Lab Results   Component Value Date    LDLCALC 105 06/05/2017     Lab Results   Component Value Date    TRIG 71 02/08/2019     No components found for: [place], [place]  Lab Results   Component Value Date    WBC 5.0 02/08/2019    HGB 14.1 02/08/2019    HCT 42.1 02/08/2019    MCV 91.7 02/08/2019     02/08/2019     No results found for: INR, PROTIME  Lab Results   Component Value Date    GLUCOSE 89 02/08/2019    CREATININE 0.93 02/08/2019    BUN 15 02/08/2019     02/08/2019    K 4.4 02/08/2019     02/08/2019    CO2 24 02/08/2019     Lab Results   Component Value Date    ALT 15 02/08/2019    AST 16 02/08/2019    ALKPHOS 77 02/08/2019    BILITOT 0.78 02/08/2019     Lab Results   Component Value Date    LABALBU 4.2 02/08/2019     Lab Results   Component Value Date    TSH 0.93 02/08/2019     Assessment:  1. Essential hypertension    2. Hypothyroidism due to medication    3. Gastroesophageal reflux disease without esophagitis        Plan:  Patient stopped taking the Protonix after 1 month supply and never called back for replacement as he discussed and currently patient states his symptoms are not bad advised him to call me back if his symptoms get any worse or more frequent we'll start on omeprazole and patient verbalized understanding  Blood pressure stable on current medications  Last TSH 0.93 and will continue current dose of Synthroid and refills given  Review in 4 months           1. Cori Iglesias received counseling on the following healthy behaviors: nutrition and exercise    2. Prior labs and health maintenance reviewed. 3.  Discussed use, benefit, and side effects of prescribed medications. Barriers to medication compliance addressed. All his questions were answered. Pt voiced understanding. Cori Iglesias will continue current medications, diet and exercise.               Orders Placed This Encounter   Medications    levothyroxine (SYNTHROID) 112 MCG tablet     Sig: TAKE 1 TABLET BY MOUTH DAILY     Dispense:  30 tablet     Refill:  5          Completed Refills               Requested Prescriptions     Signed Prescriptions Disp Refills    levothyroxine (SYNTHROID) 112 MCG tablet 30 tablet 5     Sig: TAKE 1 TABLET BY MOUTH DAILY     4. Patient given educational materials - see patient instructions    5. Was a self-tracking handout given in paper form or via [employer]? NO    No orders of the defined types were placed in this encounter. No follow-ups on file. Patient voiced understanding and agreed to treatment plan. Electronically signed by Dalia Freire MD on 6/6/2019 at 12:15 PM    This note is created with a voice recognition program and while intend to generate a document that accurately reflects the content of the visit, no guarantee can be provided that every mistake has been identified and corrected by editing.

## 2019-07-10 RX ORDER — VALSARTAN AND HYDROCHLOROTHIAZIDE 160; 12.5 MG/1; MG/1
TABLET, FILM COATED ORAL
Qty: 30 TABLET | Refills: 4 | Status: SHIPPED | OUTPATIENT
Start: 2019-07-10 | End: 2019-12-09 | Stop reason: SDUPTHER

## 2019-08-05 RX ORDER — ATORVASTATIN CALCIUM 20 MG/1
20 TABLET, FILM COATED ORAL DAILY
Qty: 30 TABLET | Refills: 3 | Status: SHIPPED | OUTPATIENT
Start: 2019-08-05 | End: 2019-12-09 | Stop reason: SDUPTHER

## 2019-09-23 RX ORDER — METOPROLOL SUCCINATE 50 MG/1
TABLET, EXTENDED RELEASE ORAL
Qty: 30 TABLET | Refills: 5 | Status: SHIPPED | OUTPATIENT
Start: 2019-09-23 | End: 2020-03-23

## 2019-10-10 ENCOUNTER — OFFICE VISIT (OUTPATIENT)
Dept: INTERNAL MEDICINE CLINIC | Age: 64
End: 2019-10-10
Payer: COMMERCIAL

## 2019-10-10 VITALS
DIASTOLIC BLOOD PRESSURE: 66 MMHG | OXYGEN SATURATION: 97 % | BODY MASS INDEX: 28.25 KG/M2 | HEART RATE: 66 BPM | HEIGHT: 75 IN | WEIGHT: 227.2 LBS | TEMPERATURE: 97.8 F | SYSTOLIC BLOOD PRESSURE: 122 MMHG

## 2019-10-10 DIAGNOSIS — I10 ESSENTIAL HYPERTENSION: Primary | ICD-10-CM

## 2019-10-10 DIAGNOSIS — J00 ACUTE RHINITIS: ICD-10-CM

## 2019-10-10 PROCEDURE — 99214 OFFICE O/P EST MOD 30 MIN: CPT | Performed by: INTERNAL MEDICINE

## 2019-10-12 DIAGNOSIS — I10 ESSENTIAL HYPERTENSION: ICD-10-CM

## 2019-10-14 RX ORDER — POTASSIUM CHLORIDE 750 MG/1
20 TABLET, FILM COATED, EXTENDED RELEASE ORAL DAILY
Qty: 60 TABLET | Refills: 2 | Status: SHIPPED | OUTPATIENT
Start: 2019-10-14 | End: 2021-01-06

## 2019-12-04 RX ORDER — LEVOTHYROXINE SODIUM 112 UG/1
TABLET ORAL
Qty: 30 TABLET | Refills: 2 | Status: SHIPPED | OUTPATIENT
Start: 2019-12-04 | End: 2020-03-02

## 2019-12-10 RX ORDER — ATORVASTATIN CALCIUM 20 MG/1
20 TABLET, FILM COATED ORAL DAILY
Qty: 30 TABLET | Refills: 3 | Status: SHIPPED | OUTPATIENT
Start: 2019-12-10 | End: 2020-04-09

## 2019-12-10 RX ORDER — VALSARTAN AND HYDROCHLOROTHIAZIDE 160; 12.5 MG/1; MG/1
TABLET, FILM COATED ORAL
Qty: 30 TABLET | Refills: 4 | Status: SHIPPED | OUTPATIENT
Start: 2019-12-10 | End: 2020-05-11

## 2019-12-12 ENCOUNTER — OFFICE VISIT (OUTPATIENT)
Dept: INTERNAL MEDICINE CLINIC | Age: 64
End: 2019-12-12
Payer: COMMERCIAL

## 2019-12-12 VITALS
DIASTOLIC BLOOD PRESSURE: 60 MMHG | HEART RATE: 75 BPM | OXYGEN SATURATION: 95 % | TEMPERATURE: 97.4 F | SYSTOLIC BLOOD PRESSURE: 90 MMHG | BODY MASS INDEX: 28.65 KG/M2 | WEIGHT: 230.4 LBS | HEIGHT: 75 IN

## 2019-12-12 DIAGNOSIS — B02.9 HERPES ZOSTER WITHOUT COMPLICATION: Primary | ICD-10-CM

## 2019-12-12 PROCEDURE — 99213 OFFICE O/P EST LOW 20 MIN: CPT | Performed by: INTERNAL MEDICINE

## 2019-12-12 RX ORDER — VALACYCLOVIR HYDROCHLORIDE 1 G/1
1000 TABLET, FILM COATED ORAL 3 TIMES DAILY
Qty: 21 TABLET | Refills: 0 | Status: SHIPPED | OUTPATIENT
Start: 2019-12-12 | End: 2020-06-02 | Stop reason: ALTCHOICE

## 2019-12-12 RX ORDER — PREDNISONE 10 MG/1
10 TABLET ORAL
Qty: 15 TABLET | Refills: 0 | Status: SHIPPED | OUTPATIENT
Start: 2019-12-12 | End: 2019-12-17

## 2020-03-02 RX ORDER — LEVOTHYROXINE SODIUM 112 UG/1
TABLET ORAL
Qty: 30 TABLET | Refills: 2 | Status: SHIPPED | OUTPATIENT
Start: 2020-03-02 | End: 2020-06-02 | Stop reason: SDUPTHER

## 2020-03-23 RX ORDER — METOPROLOL SUCCINATE 50 MG/1
TABLET, EXTENDED RELEASE ORAL
Qty: 30 TABLET | Refills: 5 | Status: SHIPPED | OUTPATIENT
Start: 2020-03-23 | End: 2020-09-14

## 2020-04-09 RX ORDER — ATORVASTATIN CALCIUM 20 MG/1
20 TABLET, FILM COATED ORAL DAILY
Qty: 30 TABLET | Refills: 3 | Status: SHIPPED | OUTPATIENT
Start: 2020-04-09 | End: 2020-08-06

## 2020-04-09 NOTE — TELEPHONE ENCOUNTER
Last filled 12/10/19 #30 with 3 RF  Last seen 12/12/19    Next Visit Date:  Future Appointments   Date Time Provider Zachery Ortizi   6/2/2020 11:00 AM Kane Guzman MD [facility] [address] Maintenance   Topic Date Due    Hepatitis C screen  1955    HIV screen  07/28/1970    Shingles Vaccine (1 of 2) 07/28/2005    Colon Cancer Screen FIT/FOBT  06/09/2018    Lipid screen  02/08/2020    TSH testing  02/08/2020    Potassium monitoring  02/08/2020    Creatinine monitoring  02/08/2020    Flu vaccine (Season Ended) 11/08/2021 (Originally 9/1/2020)    DTaP/Tdap/Td vaccine (2 - Td) 06/03/2026    Hepatitis A vaccine  Aged Out    Hepatitis B vaccine  Aged Out    Hib vaccine  Aged Out    Meningococcal (ACWY) vaccine  Aged Out    Pneumococcal 0-64 years Vaccine  Aged Out       Hemoglobin A1C (%)   Date Value   12/07/2016 5.6             ( goal A1C is < 7)   No results found for: LABMICR  LDL Cholesterol (mg/dL)   Date Value   02/08/2019 98     LDL Calculated (mg/dL)   Date Value   06/05/2017 105   12/04/2015 135       (goal LDL is <100)   AST (U/L)   Date Value   02/08/2019 16     ALT (U/L)   Date Value   02/08/2019 15     BUN (mg/dL)   Date Value   02/08/2019 15     BP Readings from Last 3 Encounters:   12/12/19 90/60   10/10/19 122/66   06/06/19 112/62          (goal 120/80)    All Future Testing planned in [facility]  Lab Frequency Next Occurrence               Patient Active Problem List:     Hyperlipidemia     Hypertension     Hypothyroidism     Epistaxis     Nevus     Basal cell carcinoma of nose     Hypogonadism in male     Localized edema

## 2020-05-11 RX ORDER — VALSARTAN AND HYDROCHLOROTHIAZIDE 160; 12.5 MG/1; MG/1
TABLET, FILM COATED ORAL
Qty: 30 TABLET | Refills: 4 | Status: SHIPPED | OUTPATIENT
Start: 2020-05-11 | End: 2020-10-06

## 2020-06-02 ENCOUNTER — HOSPITAL ENCOUNTER (OUTPATIENT)
Age: 65
Setting detail: SPECIMEN
Discharge: HOME | End: 2020-06-02
Payer: COMMERCIAL

## 2020-06-02 ENCOUNTER — OFFICE VISIT (OUTPATIENT)
Dept: INTERNAL MEDICINE CLINIC | Age: 65
End: 2020-06-02
Payer: COMMERCIAL

## 2020-06-02 VITALS
TEMPERATURE: 97.2 F | BODY MASS INDEX: 27.55 KG/M2 | HEIGHT: 75 IN | WEIGHT: 221.6 LBS | DIASTOLIC BLOOD PRESSURE: 64 MMHG | HEART RATE: 60 BPM | SYSTOLIC BLOOD PRESSURE: 114 MMHG

## 2020-06-02 LAB
ALBUMIN SERPL-MCNC: 4.2 G/DL (ref 3.5–5.2)
ALBUMIN/GLOBULIN RATIO: 1.7 (ref 1–2.5)
ALP BLD-CCNC: 62 U/L (ref 40–129)
ALT SERPL-CCNC: 18 U/L (ref 5–41)
ANION GAP SERPL CALCULATED.3IONS-SCNC: 14 MMOL/L (ref 9–17)
AST SERPL-CCNC: 21 U/L
BILIRUB SERPL-MCNC: 0.75 MG/DL (ref 0.3–1.2)
BUN BLDV-MCNC: 18 MG/DL (ref 8–23)
BUN/CREAT BLD: NORMAL (ref 9–20)
CALCIUM SERPL-MCNC: 9.7 MG/DL (ref 8.6–10.4)
CHLORIDE BLD-SCNC: 104 MMOL/L (ref 98–107)
CHOLESTEROL/HDL RATIO: 3.5
CHOLESTEROL: 143 MG/DL
CO2: 24 MMOL/L (ref 20–31)
CREAT SERPL-MCNC: 0.95 MG/DL (ref 0.7–1.2)
GFR AFRICAN AMERICAN: >60 ML/MIN
GFR NON-AFRICAN AMERICAN: >60 ML/MIN
GFR SERPL CREATININE-BSD FRML MDRD: NORMAL ML/MIN/{1.73_M2}
GFR SERPL CREATININE-BSD FRML MDRD: NORMAL ML/MIN/{1.73_M2}
GLUCOSE BLD-MCNC: 89 MG/DL (ref 70–99)
HCT VFR BLD CALC: 44.8 % (ref 40.7–50.3)
HDLC SERPL-MCNC: 41 MG/DL
HEMOGLOBIN: 14.9 G/DL (ref 13–17)
LDL CHOLESTEROL: 90 MG/DL (ref 0–130)
MCH RBC QN AUTO: 30.7 PG (ref 25.2–33.5)
MCHC RBC AUTO-ENTMCNC: 33.3 G/DL (ref 28.4–34.8)
MCV RBC AUTO: 92.2 FL (ref 82.6–102.9)
NRBC AUTOMATED: 0 PER 100 WBC
PDW BLD-RTO: 12.9 % (ref 11.8–14.4)
PLATELET # BLD: 230 K/UL (ref 138–453)
PMV BLD AUTO: 11.7 FL (ref 8.1–13.5)
POTASSIUM SERPL-SCNC: 4.5 MMOL/L (ref 3.7–5.3)
RBC # BLD: 4.86 M/UL (ref 4.21–5.77)
SODIUM BLD-SCNC: 142 MMOL/L (ref 135–144)
THYROXINE, FREE: 1.46 NG/DL (ref 0.93–1.7)
TOTAL PROTEIN: 6.7 G/DL (ref 6.4–8.3)
TRIGL SERPL-MCNC: 58 MG/DL
TSH SERPL DL<=0.05 MIU/L-ACNC: 0.9 MIU/L (ref 0.3–5)
VLDLC SERPL CALC-MCNC: NORMAL MG/DL (ref 1–30)
WBC # BLD: 5.3 K/UL (ref 3.5–11.3)

## 2020-06-02 PROCEDURE — 99214 OFFICE O/P EST MOD 30 MIN: CPT | Performed by: INTERNAL MEDICINE

## 2020-06-02 RX ORDER — LEVOTHYROXINE SODIUM 112 UG/1
TABLET ORAL
Qty: 30 TABLET | Refills: 5 | Status: SHIPPED | OUTPATIENT
Start: 2020-06-02 | End: 2020-12-28

## 2020-06-02 ASSESSMENT — PATIENT HEALTH QUESTIONNAIRE - PHQ9
SUM OF ALL RESPONSES TO PHQ QUESTIONS 1-9: 0
SUM OF ALL RESPONSES TO PHQ QUESTIONS 1-9: 0
2. FEELING DOWN, DEPRESSED OR HOPELESS: 0
SUM OF ALL RESPONSES TO PHQ9 QUESTIONS 1 & 2: 0
1. LITTLE INTEREST OR PLEASURE IN DOING THINGS: 0

## 2020-06-02 NOTE — PROGRESS NOTES
Susanne Cortes is a 59 y.o. male who presents for   Chief Complaint   Patient presents with    Hypertension     6 mo check up, denies comp;laints, stated no recent labs    Hyperlipidemia     as above    Hypothyroidism     as above    and follow up of chronic medical problems. Patient Active Problem List   Diagnosis    Hyperlipidemia    Hypertension    Hypothyroidism    Epistaxis    Nevus    Basal cell carcinoma of nose    Hypogonadism in male    Localized edema     HPI  Here for follow-up on blood pressure and cholesterol denies any new complaints    Current Outpatient Medications   Medication Sig Dispense Refill    levothyroxine (SYNTHROID) 112 MCG tablet One daily 30 tablet 5    valsartan-hydrochlorothiazide (DIOVAN-HCT) 160-12.5 MG per tablet TAKE 1 TABLET BY MOUTH DAILY 30 tablet 4    atorvastatin (LIPITOR) 20 MG tablet TAKE 1 TABLET BY MOUTH DAILY 30 tablet 3    metoprolol succinate (TOPROL XL) 50 MG extended release tablet TAKE 1 TABLET BY MOUTH DAILY 30 tablet 5    potassium chloride (KLOR-CON) 10 MEQ extended release tablet Take 2 tablets by mouth daily 60 tablet 2     No current facility-administered medications for this visit. No Known Allergies    Past Medical History:   Diagnosis Date    Hyperlipidemia     Hypertension     Thyroid disease        No past surgical history on file. No family history on file.   ROS   Constitutional:  Negative for fatigue, loss of appetite and unexpected weight change   HEENT            : Negative for neck stiffness and pain, no congestion or sinus pressure   Eyes                : No visual disturbance or pain   Cardiovascular: No chest pain or palpitations or leg swelling   Respiratory      : Negative for cough, shortness of breath or wheezing   Gastrointestinal: Negative for abdominal pain, constipation or diarrhea and bloating No nausea or vomiting   Genitourinary:     No urgency or frequency, no burning or hematuria   Musculoskeletal:

## 2020-08-06 RX ORDER — ATORVASTATIN CALCIUM 20 MG/1
TABLET, FILM COATED ORAL
Qty: 30 TABLET | Refills: 3 | Status: SHIPPED | OUTPATIENT
Start: 2020-08-06 | End: 2020-12-03

## 2020-09-14 RX ORDER — METOPROLOL SUCCINATE 50 MG/1
TABLET, EXTENDED RELEASE ORAL
Qty: 30 TABLET | Refills: 3 | Status: SHIPPED | OUTPATIENT
Start: 2020-09-14 | End: 2020-09-22

## 2020-09-14 NOTE — TELEPHONE ENCOUNTER
Suzanne Fitch is calling to request a refill on the following medication(s):    Medication Request:    Last filled 3/23/2020 #30 with 5 RF    Requested Prescriptions     Pending Prescriptions Disp Refills    metoprolol succinate (TOPROL XL) 50 MG extended release tablet [Pharmacy Med Name: METOPROLOL SUCCINATE ER 50MG SUSTAINED RELEASE TABLET] 30 tablet 5     Sig: TAKE 1 TABLET BY MOUTH DAILY       Last Visit Date (If Applicable):  8/6/6985    Next Visit Date:    12/3/2020

## 2020-09-22 RX ORDER — METOPROLOL SUCCINATE 50 MG/1
TABLET, EXTENDED RELEASE ORAL
Qty: 30 TABLET | Refills: 3 | Status: SHIPPED | OUTPATIENT
Start: 2020-09-22 | End: 2021-01-06 | Stop reason: SDUPTHER

## 2020-10-06 RX ORDER — VALSARTAN AND HYDROCHLOROTHIAZIDE 160; 12.5 MG/1; MG/1
TABLET, FILM COATED ORAL
Qty: 30 TABLET | Refills: 3 | Status: SHIPPED | OUTPATIENT
Start: 2020-10-06 | End: 2021-01-06 | Stop reason: SDUPTHER

## 2020-12-03 RX ORDER — ATORVASTATIN CALCIUM 20 MG/1
TABLET, FILM COATED ORAL
Qty: 30 TABLET | Refills: 3 | Status: SHIPPED | OUTPATIENT
Start: 2020-12-03 | End: 2021-01-06 | Stop reason: SDUPTHER

## 2020-12-03 NOTE — TELEPHONE ENCOUNTER
[facility] is calling to request a refill on the following medication(s):    Medication Request:  Requested Prescriptions     Pending Prescriptions Disp Refills    atorvastatin (LIPITOR) 20 MG tablet [Pharmacy Med Name: ATORVASTATIN CALCIUM 20MG ORAL TABLET] 30 tablet 3     Sig: TAKE ONE TABLET BY MOUTH ONCE DAILY     Last filled 8/6/2020   Last Visit Date (If Applicable):  5/5/3932    Next Visit Date:    1/6/2021

## 2021-01-06 ENCOUNTER — OFFICE VISIT (OUTPATIENT)
Dept: INTERNAL MEDICINE CLINIC | Age: 66
End: 2021-01-06
Payer: COMMERCIAL

## 2021-01-06 VITALS
WEIGHT: 226 LBS | HEIGHT: 75 IN | DIASTOLIC BLOOD PRESSURE: 80 MMHG | TEMPERATURE: 97 F | SYSTOLIC BLOOD PRESSURE: 122 MMHG | BODY MASS INDEX: 28.1 KG/M2 | HEART RATE: 64 BPM | RESPIRATION RATE: 16 BRPM

## 2021-01-06 DIAGNOSIS — R42 LIGHTHEADEDNESS: Primary | ICD-10-CM

## 2021-01-06 DIAGNOSIS — E03.2 HYPOTHYROIDISM DUE TO MEDICATION: ICD-10-CM

## 2021-01-06 DIAGNOSIS — I10 ESSENTIAL HYPERTENSION: ICD-10-CM

## 2021-01-06 DIAGNOSIS — E78.00 HIGH CHOLESTEROL: ICD-10-CM

## 2021-01-06 PROCEDURE — 99214 OFFICE O/P EST MOD 30 MIN: CPT | Performed by: INTERNAL MEDICINE

## 2021-01-06 RX ORDER — LEVOTHYROXINE SODIUM 112 UG/1
TABLET ORAL
Qty: 30 TABLET | Refills: 5 | Status: SHIPPED | OUTPATIENT
Start: 2021-01-06 | End: 2021-07-19

## 2021-01-06 RX ORDER — ATORVASTATIN CALCIUM 20 MG/1
TABLET, FILM COATED ORAL
Qty: 30 TABLET | Refills: 5 | Status: SHIPPED | OUTPATIENT
Start: 2021-01-06 | End: 2021-09-28

## 2021-01-06 RX ORDER — VALSARTAN AND HYDROCHLOROTHIAZIDE 160; 12.5 MG/1; MG/1
TABLET, FILM COATED ORAL
Qty: 30 TABLET | Refills: 5 | Status: SHIPPED | OUTPATIENT
Start: 2021-01-06 | End: 2021-07-19

## 2021-01-06 RX ORDER — METOPROLOL SUCCINATE 50 MG/1
TABLET, EXTENDED RELEASE ORAL
Qty: 30 TABLET | Refills: 5 | Status: SHIPPED | OUTPATIENT
Start: 2021-01-06 | End: 2021-07-19

## 2021-01-06 ASSESSMENT — PATIENT HEALTH QUESTIONNAIRE - PHQ9
SUM OF ALL RESPONSES TO PHQ QUESTIONS 1-9: 0
1. LITTLE INTEREST OR PLEASURE IN DOING THINGS: 0
2. FEELING DOWN, DEPRESSED OR HOPELESS: 0

## 2021-01-06 NOTE — PROGRESS NOTES
Charu Goetz is a 72 y.o. male who presents for   Chief Complaint   Patient presents with    Hypertension     6 mo check up labs 6/20    Hyperlipidemia     as above    Hypothyroidism     as above    Other     CO occas lightheadedness    and follow up of chronic medical problems. Patient Active Problem List   Diagnosis    Hyperlipidemia    Hypertension    Hypothyroidism    Epistaxis    Nevus    Basal cell carcinoma of nose    Hypogonadism in male    Localized edema     HPI  Here for follow-up on blood pressure and cholesterol denies any new complaints other than occasionally happens to have lightheadedness when he is doing things    Current Outpatient Medications   Medication Sig Dispense Refill    levothyroxine (SYNTHROID) 112 MCG tablet TAKE ONE TABLET BY MOUTH ONCE DAILY 30 tablet 5    atorvastatin (LIPITOR) 20 MG tablet TAKE ONE TABLET BY MOUTH ONCE DAILY 30 tablet 5    valsartan-hydrochlorothiazide (DIOVAN-HCT) 160-12.5 MG per tablet One daily 30 tablet 5    metoprolol succinate (TOPROL XL) 50 MG extended release tablet One daily 30 tablet 5     No current facility-administered medications for this visit. No Known Allergies    Past Medical History:   Diagnosis Date    Hyperlipidemia     Hypertension     Thyroid disease        No past surgical history on file. No family history on file. ROS  ? Constitutional:  Negative for fatigue, loss of appetite and unexpected weight change  ? HEENT            : Negative for neck stiffness and pain, no congestion or sinus pressure  ? Eyes                : No visual disturbance or pain  ? Cardiovascular: No chest pain or palpitations or leg swelling  ? Respiratory      : Negative for cough, shortness of breath or wheezing  ? Gastrointestinal: Negative for abdominal pain, constipation or diarrhea and bloating No nausea or vomiting  ?  Genitourinary:     No urgency or frequency, no burning or hematuria Lab Results   Component Value Date    WBC 5.3 06/02/2020    HGB 14.9 06/02/2020    HCT 44.8 06/02/2020    MCV 92.2 06/02/2020     06/02/2020     No results found for: INR, PROTIME  Lab Results   Component Value Date    GLUCOSE 89 06/02/2020    CREATININE 0.95 06/02/2020    BUN 18 06/02/2020     06/02/2020    K 4.5 06/02/2020     06/02/2020    CO2 24 06/02/2020     Lab Results   Component Value Date    ALT 18 06/02/2020    AST 21 06/02/2020    ALKPHOS 62 06/02/2020    BILITOT 0.75 06/02/2020     Lab Results   Component Value Date    LABALBU 4.2 06/02/2020     Lab Results   Component Value Date    TSH 0.90 06/02/2020     Assessment:  1. Lightheadedness    2. Essential hypertension    3. Hypothyroidism due to medication    4. High cholesterol        Plan:  Patient has no orthostatic changes and advised him to continue current blood pressure medications and as patient had no recent cardiac work-up an echocardiogram was ordered and also CT of the head was ordered to evaluate for dizziness and lightheadedness  Patient's TSH was within normal limits at 0.90 about 6 months back and will repeat lab work  Patient's last  and HDL 41 and new labs ordered to check for cholesterol and also ordered vitamin B12  Review in 6 months           1. Josias Blake received counseling on the following healthy behaviors: nutrition and exercise    2. Prior labs and health maintenance reviewed. 3.  Discussed use, benefit, and side effects of prescribed medications. Barriers to medication compliance addressed. All his questions were answered. Pt voiced understanding. Josias Blake will continue current medications, diet and exercise.               Orders Placed This Encounter   Medications    levothyroxine (SYNTHROID) 112 MCG tablet     Sig: TAKE ONE TABLET BY MOUTH ONCE DAILY     Dispense:  30 tablet     Refill:  5    atorvastatin (LIPITOR) 20 MG tablet     Sig: TAKE ONE TABLET BY MOUTH ONCE DAILY Dispense:  30 tablet     Refill:  5    valsartan-hydrochlorothiazide (DIOVAN-HCT) 160-12.5 MG per tablet     Sig: One daily     Dispense:  30 tablet     Refill:  5    metoprolol succinate (TOPROL XL) 50 MG extended release tablet     Sig: One daily     Dispense:  30 tablet     Refill:  5          Completed Refills               Requested Prescriptions     Signed Prescriptions Disp Refills    levothyroxine (SYNTHROID) 112 MCG tablet 30 tablet 5     Sig: TAKE ONE TABLET BY MOUTH ONCE DAILY    atorvastatin (LIPITOR) 20 MG tablet 30 tablet 5     Sig: TAKE ONE TABLET BY MOUTH ONCE DAILY    valsartan-hydrochlorothiazide (DIOVAN-HCT) 160-12.5 MG per tablet 30 tablet 5     Sig: One daily    metoprolol succinate (TOPROL XL) 50 MG extended release tablet 30 tablet 5     Sig: One daily     4. Patient given educational materials - see patient instructions    5. Was a self-tracking handout given in paper form or via [employer]? NO    Orders Placed This Encounter   Procedures    CT HEAD WO CONTRAST     Standing Status:   Future     Standing Expiration Date:   1/6/2022    Comprehensive Metabolic Panel     Standing Status:   Future     Standing Expiration Date:   1/6/2022    CBC     Standing Status:   Future     Standing Expiration Date:   1/6/2022    Vitamin B12     Standing Status:   Future     Standing Expiration Date:   1/6/2022    Lipid Panel     Standing Status:   Future     Standing Expiration Date:   1/6/2022     Order Specific Question:   Is Patient Fasting?/# of Hours     Answer:   15    TSH without Reflex     Standing Status:   Future     Standing Expiration Date:   1/6/2022    T4, Free     Standing Status:   Future     Standing Expiration Date:   1/6/2022    ECHO Complete 2D W Doppler W Color     Standing Status:   Future     Standing Expiration Date:   1/6/2022     Order Specific Question:   Reason for exam:     Answer:   Lightheadedness     Return in about 6 months (around 7/6/2021). Patient voiced understanding and agreed to treatment plan. Electronically signed by Teo Hyatt MD on 1/6/2021 at 3:16 PM    This note is created with a voice recognition program and while intend to generate a document that accurately reflects the content of the visit, no guarantee can be provided that every mistake has been identified and corrected by editing.

## 2021-04-06 ENCOUNTER — HOSPITAL ENCOUNTER (INPATIENT)
Age: 66
LOS: 2 days | Discharge: HOME | DRG: 310 | End: 2021-04-08
Attending: INTERNAL MEDICINE | Admitting: INTERNAL MEDICINE
Payer: COMMERCIAL

## 2021-04-06 ENCOUNTER — HOSPITAL ENCOUNTER (EMERGENCY)
Facility: CLINIC | Age: 66
Discharge: ANOTHER HOSPITAL | End: 2021-04-06
Attending: EMERGENCY MEDICINE
Payer: COMMERCIAL

## 2021-04-06 ENCOUNTER — APPOINTMENT (OUTPATIENT)
Dept: CT IMAGING | Facility: CLINIC | Age: 66
End: 2021-04-06
Payer: COMMERCIAL

## 2021-04-06 VITALS
TEMPERATURE: 97.4 F | HEIGHT: 75 IN | SYSTOLIC BLOOD PRESSURE: 108 MMHG | WEIGHT: 225 LBS | HEART RATE: 93 BPM | BODY MASS INDEX: 27.98 KG/M2 | DIASTOLIC BLOOD PRESSURE: 71 MMHG | OXYGEN SATURATION: 97 % | RESPIRATION RATE: 16 BRPM

## 2021-04-06 DIAGNOSIS — R79.89 ELEVATED BRAIN NATRIURETIC PEPTIDE (BNP) LEVEL: ICD-10-CM

## 2021-04-06 DIAGNOSIS — N28.9 RENAL INSUFFICIENCY: ICD-10-CM

## 2021-04-06 DIAGNOSIS — I48.91 NEW ONSET ATRIAL FIBRILLATION (HCC): Primary | ICD-10-CM

## 2021-04-06 LAB
-: ABNORMAL
ABSOLUTE EOS #: 0.1 K/UL (ref 0–0.4)
ABSOLUTE IMMATURE GRANULOCYTE: ABNORMAL K/UL (ref 0–0.3)
ABSOLUTE LYMPH #: 1.1 K/UL (ref 1–4.8)
ABSOLUTE MONO #: 0.9 K/UL (ref 0.1–1.2)
ALBUMIN SERPL-MCNC: 3.8 G/DL (ref 3.5–5.2)
ALBUMIN/GLOBULIN RATIO: 1.2 (ref 1–2.5)
ALP BLD-CCNC: 56 U/L (ref 40–129)
ALT SERPL-CCNC: 23 U/L (ref 5–41)
AMORPHOUS: ABNORMAL
ANION GAP SERPL CALCULATED.3IONS-SCNC: 12 MMOL/L (ref 9–17)
AST SERPL-CCNC: 33 U/L
BACTERIA: ABNORMAL
BASOPHILS # BLD: 0 % (ref 0–2)
BASOPHILS ABSOLUTE: 0 K/UL (ref 0–0.2)
BILIRUB SERPL-MCNC: 0.9 MG/DL (ref 0.3–1.2)
BILIRUBIN DIRECT: 0.18 MG/DL
BILIRUBIN URINE: NEGATIVE
BILIRUBIN, INDIRECT: 0.72 MG/DL (ref 0–1)
BNP INTERPRETATION: ABNORMAL
BUN BLDV-MCNC: 32 MG/DL (ref 8–23)
BUN/CREAT BLD: ABNORMAL (ref 9–20)
CALCIUM SERPL-MCNC: 9.7 MG/DL (ref 8.6–10.4)
CASTS UA: ABNORMAL /LPF (ref 0–2)
CASTS UA: ABNORMAL /LPF (ref 0–2)
CHLORIDE BLD-SCNC: 100 MMOL/L (ref 98–107)
CK MB: 2.2 NG/ML
CO2: 24 MMOL/L (ref 20–31)
COLOR: YELLOW
COMMENT UA: ABNORMAL
CREAT SERPL-MCNC: 1.3 MG/DL (ref 0.7–1.2)
CRYSTALS, UA: ABNORMAL /HPF
D-DIMER QUANTITATIVE: 4.55 MG/L FEU
DIFFERENTIAL TYPE: ABNORMAL
DIRECT EXAM: NORMAL
EKG ATRIAL RATE: 84 BPM
EKG Q-T INTERVAL: 372 MS
EKG QRS DURATION: 114 MS
EKG QTC CALCULATION (BAZETT): 412 MS
EKG R AXIS: -8 DEGREES
EKG T AXIS: -36 DEGREES
EKG VENTRICULAR RATE: 74 BPM
EOSINOPHILS RELATIVE PERCENT: 1 % (ref 1–4)
EPITHELIAL CELLS UA: ABNORMAL /HPF (ref 0–5)
GFR AFRICAN AMERICAN: >60 ML/MIN
GFR NON-AFRICAN AMERICAN: 55 ML/MIN
GFR SERPL CREATININE-BSD FRML MDRD: ABNORMAL ML/MIN/{1.73_M2}
GFR SERPL CREATININE-BSD FRML MDRD: ABNORMAL ML/MIN/{1.73_M2}
GLOBULIN: NORMAL G/DL (ref 1.5–3.8)
GLUCOSE BLD-MCNC: 111 MG/DL (ref 70–99)
GLUCOSE URINE: NEGATIVE
HCT VFR BLD CALC: 44.1 % (ref 41–53)
HCT VFR BLD CALC: 47.6 % (ref 41–53)
HEMOGLOBIN: 14.7 G/DL (ref 13.5–17.5)
HEMOGLOBIN: 16 G/DL (ref 13.5–17.5)
IMMATURE GRANULOCYTES: ABNORMAL %
INR BLD: 0.9
KETONES, URINE: ABNORMAL
LACTIC ACID, SEPSIS WHOLE BLOOD: NORMAL MMOL/L (ref 0.5–1.9)
LACTIC ACID, SEPSIS: 1.6 MMOL/L (ref 0.5–1.9)
LEUKOCYTE ESTERASE, URINE: NEGATIVE
LIPASE: 158 U/L (ref 13–60)
LYMPHOCYTES # BLD: 14 % (ref 24–44)
Lab: NORMAL
MAGNESIUM: 1.9 MG/DL (ref 1.6–2.6)
MCH RBC QN AUTO: 29.9 PG (ref 26–34)
MCH RBC QN AUTO: 30.4 PG (ref 26–34)
MCHC RBC AUTO-ENTMCNC: 33.3 G/DL (ref 31–37)
MCHC RBC AUTO-ENTMCNC: 33.7 G/DL (ref 31–37)
MCV RBC AUTO: 89.9 FL (ref 80–100)
MCV RBC AUTO: 90.1 FL (ref 80–100)
MONOCYTES # BLD: 12 % (ref 2–11)
MUCUS: ABNORMAL
MYOGLOBIN: 101 NG/ML (ref 28–72)
NITRITE, URINE: NEGATIVE
NRBC AUTOMATED: ABNORMAL PER 100 WBC
NRBC AUTOMATED: NORMAL PER 100 WBC
OTHER OBSERVATIONS UA: ABNORMAL
PARTIAL THROMBOPLASTIN TIME: 27.3 SEC (ref 21.3–31.3)
PARTIAL THROMBOPLASTIN TIME: 27.7 SEC (ref 21.3–31.3)
PDW BLD-RTO: 13.5 % (ref 12.5–15.4)
PDW BLD-RTO: 13.9 % (ref 12.5–15.4)
PH UA: 6 (ref 5–8)
PLATELET # BLD: 152 K/UL (ref 140–450)
PLATELET # BLD: 173 K/UL (ref 140–450)
PLATELET ESTIMATE: ABNORMAL
PMV BLD AUTO: 8.8 FL (ref 6–12)
PMV BLD AUTO: 8.9 FL (ref 6–12)
POTASSIUM SERPL-SCNC: 3.9 MMOL/L (ref 3.7–5.3)
PRO-BNP: 2181 PG/ML
PROTEIN UA: ABNORMAL
PROTHROMBIN TIME: 9.8 SEC (ref 9.4–12.6)
RBC # BLD: 4.91 M/UL (ref 4.5–5.9)
RBC # BLD: 5.28 M/UL (ref 4.5–5.9)
RBC # BLD: ABNORMAL 10*6/UL
RBC UA: ABNORMAL /HPF (ref 0–2)
RENAL EPITHELIAL, UA: ABNORMAL /HPF
SARS-COV-2, RAPID: NOT DETECTED
SEG NEUTROPHILS: 73 % (ref 36–66)
SEGMENTED NEUTROPHILS ABSOLUTE COUNT: 5.6 K/UL (ref 1.8–7.7)
SODIUM BLD-SCNC: 136 MMOL/L (ref 135–144)
SPECIFIC GRAVITY UA: 1.01 (ref 1–1.03)
SPECIMEN DESCRIPTION: NORMAL
SPECIMEN DESCRIPTION: NORMAL
TOTAL CK: 135 U/L (ref 39–308)
TOTAL PROTEIN: 7 G/DL (ref 6.4–8.3)
TRICHOMONAS: ABNORMAL
TROPONIN INTERP: NORMAL
TROPONIN T: NORMAL NG/ML
TROPONIN, HIGH SENSITIVITY: 15 NG/L (ref 0–22)
TROPONIN, HIGH SENSITIVITY: 16 NG/L (ref 0–22)
TROPONIN, HIGH SENSITIVITY: 20 NG/L (ref 0–22)
TURBIDITY: CLEAR
URINE HGB: NEGATIVE
UROBILINOGEN, URINE: ABNORMAL
WBC # BLD: 7.2 K/UL (ref 3.5–11)
WBC # BLD: 7.7 K/UL (ref 3.5–11)
WBC # BLD: ABNORMAL 10*3/UL
WBC UA: ABNORMAL /HPF (ref 0–5)
YEAST: ABNORMAL

## 2021-04-06 PROCEDURE — 85379 FIBRIN DEGRADATION QUANT: CPT

## 2021-04-06 PROCEDURE — 6360000002 HC RX W HCPCS: Performed by: NURSE PRACTITIONER

## 2021-04-06 PROCEDURE — 96365 THER/PROPH/DIAG IV INF INIT: CPT

## 2021-04-06 PROCEDURE — 82550 ASSAY OF CK (CPK): CPT

## 2021-04-06 PROCEDURE — 83874 ASSAY OF MYOGLOBIN: CPT

## 2021-04-06 PROCEDURE — 80048 BASIC METABOLIC PNL TOTAL CA: CPT

## 2021-04-06 PROCEDURE — 87804 INFLUENZA ASSAY W/OPTIC: CPT

## 2021-04-06 PROCEDURE — 85610 PROTHROMBIN TIME: CPT

## 2021-04-06 PROCEDURE — 83605 ASSAY OF LACTIC ACID: CPT

## 2021-04-06 PROCEDURE — 2060000000 HC ICU INTERMEDIATE R&B

## 2021-04-06 PROCEDURE — 83880 ASSAY OF NATRIURETIC PEPTIDE: CPT

## 2021-04-06 PROCEDURE — 71260 CT THORAX DX C+: CPT

## 2021-04-06 PROCEDURE — G0378 HOSPITAL OBSERVATION PER HR: HCPCS

## 2021-04-06 PROCEDURE — 83735 ASSAY OF MAGNESIUM: CPT

## 2021-04-06 PROCEDURE — 2580000003 HC RX 258: Performed by: NURSE PRACTITIONER

## 2021-04-06 PROCEDURE — 80076 HEPATIC FUNCTION PANEL: CPT

## 2021-04-06 PROCEDURE — 81001 URINALYSIS AUTO W/SCOPE: CPT

## 2021-04-06 PROCEDURE — 85730 THROMBOPLASTIN TIME PARTIAL: CPT

## 2021-04-06 PROCEDURE — 84484 ASSAY OF TROPONIN QUANT: CPT

## 2021-04-06 PROCEDURE — G0379 DIRECT REFER HOSPITAL OBSERV: HCPCS

## 2021-04-06 PROCEDURE — 6360000004 HC RX CONTRAST MEDICATION: Performed by: EMERGENCY MEDICINE

## 2021-04-06 PROCEDURE — 2580000003 HC RX 258: Performed by: EMERGENCY MEDICINE

## 2021-04-06 PROCEDURE — 85027 COMPLETE CBC AUTOMATED: CPT

## 2021-04-06 PROCEDURE — 36415 COLL VENOUS BLD VENIPUNCTURE: CPT

## 2021-04-06 PROCEDURE — 87040 BLOOD CULTURE FOR BACTERIA: CPT

## 2021-04-06 PROCEDURE — 87635 SARS-COV-2 COVID-19 AMP PRB: CPT

## 2021-04-06 PROCEDURE — 96366 THER/PROPH/DIAG IV INF ADDON: CPT

## 2021-04-06 PROCEDURE — 83690 ASSAY OF LIPASE: CPT

## 2021-04-06 PROCEDURE — 93005 ELECTROCARDIOGRAM TRACING: CPT | Performed by: EMERGENCY MEDICINE

## 2021-04-06 PROCEDURE — 6370000000 HC RX 637 (ALT 250 FOR IP): Performed by: EMERGENCY MEDICINE

## 2021-04-06 PROCEDURE — 82553 CREATINE MB FRACTION: CPT

## 2021-04-06 PROCEDURE — 99284 EMERGENCY DEPT VISIT MOD MDM: CPT

## 2021-04-06 PROCEDURE — 85025 COMPLETE CBC W/AUTO DIFF WBC: CPT

## 2021-04-06 RX ORDER — SODIUM CHLORIDE 0.9 % (FLUSH) 0.9 %
10 SYRINGE (ML) INJECTION EVERY 12 HOURS SCHEDULED
Status: DISCONTINUED | OUTPATIENT
Start: 2021-04-06 | End: 2021-04-08 | Stop reason: HOSPADM

## 2021-04-06 RX ORDER — 0.9 % SODIUM CHLORIDE 0.9 %
1000 INTRAVENOUS SOLUTION INTRAVENOUS ONCE
Status: COMPLETED | OUTPATIENT
Start: 2021-04-06 | End: 2021-04-06

## 2021-04-06 RX ORDER — METOPROLOL TARTRATE 5 MG/5ML
5 INJECTION INTRAVENOUS EVERY 6 HOURS PRN
Status: DISCONTINUED | OUTPATIENT
Start: 2021-04-06 | End: 2021-04-08 | Stop reason: HOSPADM

## 2021-04-06 RX ORDER — HEPARIN SODIUM 1000 [USP'U]/ML
4000 INJECTION, SOLUTION INTRAVENOUS; SUBCUTANEOUS ONCE
Status: COMPLETED | OUTPATIENT
Start: 2021-04-06 | End: 2021-04-06

## 2021-04-06 RX ORDER — 0.9 % SODIUM CHLORIDE 0.9 %
70 INTRAVENOUS SOLUTION INTRAVENOUS ONCE
Status: COMPLETED | OUTPATIENT
Start: 2021-04-06 | End: 2021-04-06

## 2021-04-06 RX ORDER — HEPARIN SODIUM 10000 [USP'U]/100ML
5-30 INJECTION, SOLUTION INTRAVENOUS CONTINUOUS
Status: DISCONTINUED | OUTPATIENT
Start: 2021-04-06 | End: 2021-04-08

## 2021-04-06 RX ORDER — POLYETHYLENE GLYCOL 3350 17 G/17G
17 POWDER, FOR SOLUTION ORAL DAILY PRN
Status: DISCONTINUED | OUTPATIENT
Start: 2021-04-06 | End: 2021-04-08 | Stop reason: HOSPADM

## 2021-04-06 RX ORDER — ACETAMINOPHEN 325 MG/1
650 TABLET ORAL EVERY 6 HOURS PRN
Status: DISCONTINUED | OUTPATIENT
Start: 2021-04-06 | End: 2021-04-08 | Stop reason: HOSPADM

## 2021-04-06 RX ORDER — PROMETHAZINE HYDROCHLORIDE 25 MG/1
12.5 TABLET ORAL EVERY 6 HOURS PRN
Status: DISCONTINUED | OUTPATIENT
Start: 2021-04-06 | End: 2021-04-08 | Stop reason: HOSPADM

## 2021-04-06 RX ORDER — SODIUM CHLORIDE 9 MG/ML
INJECTION, SOLUTION INTRAVENOUS CONTINUOUS
Status: DISCONTINUED | OUTPATIENT
Start: 2021-04-06 | End: 2021-04-06 | Stop reason: HOSPADM

## 2021-04-06 RX ORDER — MULTIVIT WITH MINERALS/LUTEIN
1000 TABLET ORAL DAILY
COMMUNITY

## 2021-04-06 RX ORDER — SODIUM CHLORIDE 0.9 % (FLUSH) 0.9 %
10 SYRINGE (ML) INJECTION PRN
Status: DISCONTINUED | OUTPATIENT
Start: 2021-04-06 | End: 2021-04-06 | Stop reason: HOSPADM

## 2021-04-06 RX ORDER — HEPARIN SODIUM 1000 [USP'U]/ML
4000 INJECTION, SOLUTION INTRAVENOUS; SUBCUTANEOUS PRN
Status: DISCONTINUED | OUTPATIENT
Start: 2021-04-06 | End: 2021-04-08 | Stop reason: HOSPADM

## 2021-04-06 RX ORDER — SODIUM CHLORIDE 0.9 % (FLUSH) 0.9 %
10 SYRINGE (ML) INJECTION PRN
Status: DISCONTINUED | OUTPATIENT
Start: 2021-04-06 | End: 2021-04-08 | Stop reason: HOSPADM

## 2021-04-06 RX ORDER — NICOTINE 21 MG/24HR
1 PATCH, TRANSDERMAL 24 HOURS TRANSDERMAL DAILY PRN
Status: DISCONTINUED | OUTPATIENT
Start: 2021-04-06 | End: 2021-04-08 | Stop reason: HOSPADM

## 2021-04-06 RX ORDER — ACETAMINOPHEN 650 MG/1
650 SUPPOSITORY RECTAL EVERY 6 HOURS PRN
Status: DISCONTINUED | OUTPATIENT
Start: 2021-04-06 | End: 2021-04-08 | Stop reason: HOSPADM

## 2021-04-06 RX ORDER — HEPARIN SODIUM 1000 [USP'U]/ML
2000 INJECTION, SOLUTION INTRAVENOUS; SUBCUTANEOUS PRN
Status: DISCONTINUED | OUTPATIENT
Start: 2021-04-06 | End: 2021-04-08 | Stop reason: HOSPADM

## 2021-04-06 RX ORDER — ONDANSETRON 2 MG/ML
4 INJECTION INTRAMUSCULAR; INTRAVENOUS EVERY 6 HOURS PRN
Status: DISCONTINUED | OUTPATIENT
Start: 2021-04-06 | End: 2021-04-08 | Stop reason: HOSPADM

## 2021-04-06 RX ORDER — ASPIRIN 81 MG/1
324 TABLET, CHEWABLE ORAL ONCE
Status: COMPLETED | OUTPATIENT
Start: 2021-04-06 | End: 2021-04-06

## 2021-04-06 RX ORDER — SODIUM CHLORIDE 9 MG/ML
25 INJECTION, SOLUTION INTRAVENOUS PRN
Status: DISCONTINUED | OUTPATIENT
Start: 2021-04-06 | End: 2021-04-08 | Stop reason: HOSPADM

## 2021-04-06 RX ADMIN — ASPIRIN 324 MG: 81 TABLET, CHEWABLE ORAL at 12:41

## 2021-04-06 RX ADMIN — SODIUM CHLORIDE, PRESERVATIVE FREE 10 ML: 5 INJECTION INTRAVENOUS at 20:13

## 2021-04-06 RX ADMIN — HEPARIN SODIUM AND DEXTROSE 10.2 UNITS/KG/HR: 10000; 5 INJECTION INTRAVENOUS at 20:14

## 2021-04-06 RX ADMIN — SODIUM CHLORIDE: 9 INJECTION, SOLUTION INTRAVENOUS at 13:43

## 2021-04-06 RX ADMIN — SODIUM CHLORIDE 70 ML: 9 INJECTION, SOLUTION INTRAVENOUS at 11:36

## 2021-04-06 RX ADMIN — IOPAMIDOL 75 ML: 755 INJECTION, SOLUTION INTRAVENOUS at 11:36

## 2021-04-06 RX ADMIN — SODIUM CHLORIDE 1000 ML: 9 INJECTION, SOLUTION INTRAVENOUS at 10:30

## 2021-04-06 RX ADMIN — HEPARIN SODIUM 4000 UNITS: 1000 INJECTION INTRAVENOUS; SUBCUTANEOUS at 20:13

## 2021-04-06 RX ADMIN — Medication 10 ML: at 11:36

## 2021-04-06 ASSESSMENT — ENCOUNTER SYMPTOMS
NAUSEA: 1
DIARRHEA: 1

## 2021-04-06 ASSESSMENT — PAIN SCALES - GENERAL: PAINLEVEL_OUTOF10: 2

## 2021-04-06 NOTE — PROGRESS NOTES
Patient admitted to [place] [address] ICU unit room 326 via wheelchair. Patient oriented to room and instructed on use of call light, bed locked and in lowest position, and side rails up x2.

## 2021-04-06 NOTE — ED NOTES
Spoke with [facility], they are paging hospitalist at [facility] again.  Will call back with update       Damon Stanley RN  04/06/21 [id number]

## 2021-04-06 NOTE — ED PROVIDER NOTES
[facility]  [address] [facility]  Phone: [phone]        Pt Name: Jeanne Pedroza  MRN: [id number]  Mingfezio 1955  Date of evaluation: 4/6/21      CHIEF COMPLAINT   No chief complaint on file. HISTORY OF PRESENT ILLNESS  (Location/Symptom, Timing/Onset, Context/Setting, Quality, Duration, Modifying Factors, Severity.)    Jeanne Pedroza is a 72 y.o. male who presents with chills fatigue body aches diarrhea. The patient dates on Saturday he started developing chills associated with this he has fatigue he noticed shortness of breath with exertion has overall body aches he has had loose stools he has had decreased smell and taste denies being exposed to anybody that he knows has had Covid some nausea but no vomiting no chest pain no headache exertion seems to make his symptoms worse nothing makes it better      REVIEW OF SYSTEMS    (2-9 systems for level 4, 10 or more for level 5)     Review of Systems   Constitutional: Positive for chills and fatigue. Gastrointestinal: Positive for diarrhea and nausea. Musculoskeletal: Positive for myalgias. All other systems reviewed and are negative. PAST MEDICAL HISTORY    has a past medical history of Hyperlipidemia, Hypertension, and Thyroid disease. SURGICAL HISTORY      has no past surgical history on file. CURRENTMEDICATIONS       Previous Medications    ATORVASTATIN (LIPITOR) 20 MG TABLET    TAKE ONE TABLET BY MOUTH ONCE DAILY    LEVOTHYROXINE (SYNTHROID) 112 MCG TABLET    TAKE ONE TABLET BY MOUTH ONCE DAILY    METOPROLOL SUCCINATE (TOPROL XL) 50 MG EXTENDED RELEASE TABLET    One daily    VALSARTAN-HYDROCHLOROTHIAZIDE (DIOVAN-HCT) 160-12.5 MG PER TABLET    One daily       ALLERGIES     has No Known Allergies. FAMILY HISTORY     has no family status information on file. family history is not on file. SOCIAL HISTORY      reports that he quit smoking about 40 years ago.  He has a 13.00 pack-year smoking we will get imaging of the chest I will get a UA    DIAGNOSTIC RESULTS     EKG: All EKG's are interpreted by the Emergency Department Physician who either signs or Co-signs this chart in the [address] a cardiologist.      Interpreted by Mere Mulligan MD     Rhythm: Atrial fibrillation  Rate: 74  Axis: -8  Ectopy: none  Conduction: normal  ST Segments: no acute change  T Waves: Nonspecific  Q Waves: none    Clinical Impression: Atrial fibrillation with nonspecific T waves      RADIOLOGY:  Non-plain film images such as CT, Ultrasound and MRI are read by the radiologist. Maggie Poe radiographic images are visualized and the radiologist interpretations are reviewed as follows:         Interpretation per the Radiologist below, if available at the time of this note:      Ct Chest Pulmonary Embolism W Contrast    Result Date: 4/6/2021  EXAMINATION: CTA OF THE CHEST 4/6/2021 11:22 am TECHNIQUE: CTA of the chest was performed after the administration of intravenous contrast.  Multiplanar reformatted images are provided for review. MIP images are provided for review. Dose modulation, iterative reconstruction, and/or weight based adjustment of the mA/kV was utilized to reduce the radiation dose to as low as reasonably achievable. COMPARISON: None. HISTORY: ORDERING SYSTEM PROVIDED HISTORY: elevated d-dimer, fatigue, viral symptoms TECHNOLOGIST PROVIDED HISTORY: elevated d-dimer, fatigue, viral symptoms Decision Support Exception->Emergency Medical Condition (MA) Reason for Exam: Nausea, fatigue, diarrhea elevated d dimer Acuity: Acute Type of Exam: Initial FINDINGS: Pulmonary Arteries: Pulmonary arteries are adequately opacified for evaluation. No intraluminal filling defect to suggest pulmonary embolism. Main pulmonary artery is normal in caliber. Mediastinum: No mediastinal lymphadenopathy. The heart and pericardium demonstrate no acute abnormality. Cardiomegaly. Coronary artery disease.  There is no acute abnormality of the thoracic aorta. Lungs/pleura: The central airways are patent. No focal consolidation. No pleural effusion. No pneumothorax. Upper Abdomen: Limited images of the upper abdomen are unremarkable. Soft Tissues/Bones: No acute bone or soft tissue abnormality. No pulmonary embolism or acute pulmonary abnormality. Cardiomegaly and coronary artery disease. LABS:  Results for orders placed or performed during the hospital encounter of 04/06/21   COVID-19, Rapid    Specimen: Nasopharyngeal Swab   Result Value Ref Range    Specimen Description . NASOPHARYNGEAL SWAB     SARS-CoV-2, Rapid Not Detected Not Detected   Rapid Influenza A/B Antigens    Specimen: Nasopharyngeal Swab   Result Value Ref Range    Specimen Description . NASOPHARYNGEAL SWAB     Special Requests NOT REPORTED     Direct Exam       NEGATIVE for Influenza A + B antigens. PCR testing to confirm this result is available upon request.  Specimen will be saved in the laboratory for 7 days. Please call [phone] if PCR testing is indicated.    Basic Metabolic Panel   Result Value Ref Range    Glucose 111 (H) 70 - 99 mg/dL    BUN 32 (H) 8 - 23 mg/dL    CREATININE 1.30 (H) 0.70 - 1.20 mg/dL    Bun/Cre Ratio NOT REPORTED 9 - 20    Calcium 9.7 8.6 - 10.4 mg/dL    Sodium 136 135 - 144 mmol/L    Potassium 3.9 3.7 - 5.3 mmol/L    Chloride 100 98 - 107 mmol/L    CO2 24 20 - 31 mmol/L    Anion Gap 12 9 - 17 mmol/L    GFR Non-African American 55 (L) >60 mL/min    GFR African American >60 >60 mL/min    GFR Comment          GFR Staging NOT REPORTED    CBC Auto Differential   Result Value Ref Range    WBC 7.7 3.5 - 11.0 k/uL    RBC 5.28 4.5 - 5.9 m/uL    Hemoglobin 16.0 13.5 - 17.5 g/dL    Hematocrit 47.6 41 - 53 %    MCV 90.1 80 - 100 fL    MCH 30.4 26 - 34 pg    MCHC 33.7 31 - 37 g/dL    RDW 13.5 12.5 - 15.4 %    Platelets 621 435 - 944 k/uL    MPV 8.9 6.0 - 12.0 fL    NRBC Automated NOT REPORTED per 100 WBC    Differential Type NOT REPORTED     Seg Neutrophils 73 (H) 36 - 66 %    Lymphocytes 14 (L) 24 - 44 %    Monocytes 12 (H) 2 - 11 %    Eosinophils % 1 1 - 4 %    Basophils 0 0 - 2 %    Immature Granulocytes NOT REPORTED 0 %    Segs Absolute 5.60 1.8 - 7.7 k/uL    Absolute Lymph # 1.10 1.0 - 4.8 k/uL    Absolute Mono # 0.90 0.1 - 1.2 k/uL    Absolute Eos # 0.10 0.0 - 0.4 k/uL    Basophils Absolute 0.00 0.0 - 0.2 k/uL    Absolute Immature Granulocyte NOT REPORTED 0.00 - 0.30 k/uL    WBC Morphology NOT REPORTED     RBC Morphology NOT REPORTED     Platelet Estimate NOT REPORTED    Hepatic Function Panel   Result Value Ref Range    Albumin 3.8 3.5 - 5.2 g/dL    Alkaline Phosphatase 56 40 - 129 U/L    ALT 23 5 - 41 U/L    AST 33 <40 U/L    Total Bilirubin 0.90 0.3 - 1.2 mg/dL    Bilirubin, Direct 0.18 <0.31 mg/dL    Bilirubin, Indirect 0.72 0.00 - 1.00 mg/dL    Total Protein 7.0 6.4 - 8.3 g/dL    Globulin NOT REPORTED 1.5 - 3.8 g/dL    Albumin/Globulin Ratio 1.2 1.0 - 2.5   Lipase   Result Value Ref Range    Lipase 158 (H) 13 - 60 U/L   Troponin   Result Value Ref Range    Troponin, High Sensitivity 20 0 - 22 ng/L    Troponin T NOT REPORTED <0.03 ng/mL    Troponin Interp NOT REPORTED    Urinalysis Reflex to Culture   Result Value Ref Range    Color, UA YELLOW YELLOW    Turbidity UA CLEAR CLEAR    Glucose, Ur NEGATIVE NEGATIVE    Bilirubin Urine NEGATIVE NEGATIVE    Ketones, Urine MODERATE (A) NEGATIVE    Specific Gravity, UA 1.015 1.005 - 1.030    Urine Hgb NEGATIVE NEGATIVE    pH, UA 6.0 5.0 - 8.0    Protein, UA 2+ (A) NEGATIVE    Urobilinogen, Urine ELEVATED (A) Normal    Nitrite, Urine NEGATIVE NEGATIVE    Leukocyte Esterase, Urine NEGATIVE NEGATIVE    Urinalysis Comments NOT REPORTED    Lactate, Sepsis   Result Value Ref Range    Lactic Acid, Sepsis 1.6 0.5 - 1.9 mmol/L    Lactic Acid, Sepsis, Whole Blood NOT REPORTED 0.5 - 1.9 mmol/L   D-Dimer, Quantitative   Result Value Ref Range    D-Dimer, Quant 4.55 mg/L FEU   Protime-INR is normalized after a liter of fluid it is 761 systolic given the elevated BNP and the normalization of his blood pressure at this point we will hold on further fluid boluses the patient is noted to be in new onset atrial fibrillation of which she was given aspirin given the renal insufficiency the new onset atrial fibrillation I do feel that the patient warrants admission to a hospital setting after a very lengthy conversation the patient is agreeable to this plan so will contact his primary care physician who appears to admit his patients primarily for admission to [facility]    CONSULTS:  I did speak with my hospitalist at [facility] and his bed situation is currently full he did look in the computer and there are beds available at [facility] when I discussed this with the patient the patient is agreeable to be admitted to [facility] so we will contact their hospitalist for admission  I then spoke to the hospitalist at [facility] and she is kind enough to admit the patient to her service when I relayed all of this information the patient he is agreeable to the admission at [facility] but he is refusing to go by ambulance  The patient and/or guardian has normal mental status and adequate capacity to make medical decisions. The patient and/or guardian had the opportunity to ask questions about his medical condition. The patient was advised to be transported via ambulance for transfer. The patient and/or guardian refuses ambulance transport and wishes to be transported via spouse. The patient and/or guardian was able to understand the risks and benefits of transport. The risks have been explained to the patient and/or guardian, including worsening illness, permanent disability and death. The benefits of ambulance transport have also been explained, including the availability of appropriate equipment and appropriate staff for stabilization and transport.       All relevant records were sent with the patient for transfer. He was advised to be taken directly to [facility] for admission. PROCEDURES:  None    FINAL IMPRESSION      1. New onset atrial fibrillation ([address]    2. Renal insufficiency    3. Elevated brain natriuretic peptide (BNP) level          DISPOSITION/PLAN   DISPOSITION        CONDITION ON DISPOSITION:   Stable    PATIENT REFERRED TO:  No follow-up provider specified. DISCHARGE MEDICATIONS:  New Prescriptions    No medications on file       (Please note that portions of this note were completed with a voicerecognition program.  Efforts were made to edit the dictations but occasionally words are mis-transcribed.)    Poe MD, F.A.C.E.P.   Attending Emergency Medicine Physician           Dipak Hinkle MD  04/06/21 [id number]

## 2021-04-06 NOTE — ED NOTES
Patient presents to the ED with complaints of Nausea, Fatigue and Diarrhea that he's been experiencing for about a week now. He denies SOB, chest pain and fever. States he hasn't been around anyone with COVID. Independent.  A&Ox4      Missy Olson RN  04/06/21 [id number]

## 2021-04-07 ENCOUNTER — APPOINTMENT (OUTPATIENT)
Dept: ULTRASOUND IMAGING | Age: 66
DRG: 310 | End: 2021-04-07
Attending: INTERNAL MEDICINE
Payer: COMMERCIAL

## 2021-04-07 PROBLEM — Z78.9 ALCOHOL USE: Status: ACTIVE | Noted: 2021-04-07

## 2021-04-07 PROBLEM — E86.0 DEHYDRATION: Status: ACTIVE | Noted: 2021-04-07

## 2021-04-07 PROBLEM — N28.9 ACUTE RENAL INSUFFICIENCY: Status: ACTIVE | Noted: 2021-04-07

## 2021-04-07 PROBLEM — F10.90 ALCOHOL USE: Status: ACTIVE | Noted: 2021-04-07

## 2021-04-07 LAB
ALBUMIN SERPL-MCNC: 3.4 G/DL (ref 3.5–5.2)
ALBUMIN/GLOBULIN RATIO: 1.3 (ref 1–2.5)
ALP BLD-CCNC: 49 U/L (ref 40–129)
ALT SERPL-CCNC: 18 U/L (ref 5–41)
ANION GAP SERPL CALCULATED.3IONS-SCNC: 9 MMOL/L (ref 9–17)
AST SERPL-CCNC: 22 U/L
BILIRUB SERPL-MCNC: 0.67 MG/DL (ref 0.3–1.2)
BNP INTERPRETATION: ABNORMAL
BUN BLDV-MCNC: 24 MG/DL (ref 8–23)
BUN/CREAT BLD: ABNORMAL (ref 9–20)
CALCIUM SERPL-MCNC: 9.2 MG/DL (ref 8.6–10.4)
CHLORIDE BLD-SCNC: 104 MMOL/L (ref 98–107)
CO2: 26 MMOL/L (ref 20–31)
CREAT SERPL-MCNC: 1.08 MG/DL (ref 0.7–1.2)
GFR AFRICAN AMERICAN: >60 ML/MIN
GFR NON-AFRICAN AMERICAN: >60 ML/MIN
GFR SERPL CREATININE-BSD FRML MDRD: ABNORMAL ML/MIN/{1.73_M2}
GFR SERPL CREATININE-BSD FRML MDRD: ABNORMAL ML/MIN/{1.73_M2}
GLUCOSE BLD-MCNC: 98 MG/DL (ref 70–99)
INR BLD: 1
LV EF: 48 %
LVEF MODALITY: NORMAL
MAGNESIUM: 1.8 MG/DL (ref 1.6–2.6)
PARTIAL THROMBOPLASTIN TIME: 50.9 SEC (ref 21.3–31.3)
PARTIAL THROMBOPLASTIN TIME: 52.5 SEC (ref 21.3–31.3)
POTASSIUM SERPL-SCNC: 3.5 MMOL/L (ref 3.7–5.3)
PRO-BNP: 1397 PG/ML
PROTHROMBIN TIME: 10 SEC (ref 9.4–12.6)
SODIUM BLD-SCNC: 139 MMOL/L (ref 135–144)
TOTAL PROTEIN: 6.1 G/DL (ref 6.4–8.3)
TSH SERPL DL<=0.05 MIU/L-ACNC: 1.62 MIU/L (ref 0.3–5)

## 2021-04-07 PROCEDURE — APPSS180 APP SPLIT SHARED TIME > 60 MINUTES: Performed by: NURSE PRACTITIONER

## 2021-04-07 PROCEDURE — 83735 ASSAY OF MAGNESIUM: CPT

## 2021-04-07 PROCEDURE — G0378 HOSPITAL OBSERVATION PER HR: HCPCS

## 2021-04-07 PROCEDURE — 6370000000 HC RX 637 (ALT 250 FOR IP): Performed by: NURSE PRACTITIONER

## 2021-04-07 PROCEDURE — 93306 TTE W/DOPPLER COMPLETE: CPT

## 2021-04-07 PROCEDURE — 99222 1ST HOSP IP/OBS MODERATE 55: CPT | Performed by: INTERNAL MEDICINE

## 2021-04-07 PROCEDURE — 36415 COLL VENOUS BLD VENIPUNCTURE: CPT

## 2021-04-07 PROCEDURE — 80053 COMPREHEN METABOLIC PANEL: CPT

## 2021-04-07 PROCEDURE — 83880 ASSAY OF NATRIURETIC PEPTIDE: CPT

## 2021-04-07 PROCEDURE — 2580000003 HC RX 258: Performed by: NURSE PRACTITIONER

## 2021-04-07 PROCEDURE — 93971 EXTREMITY STUDY: CPT

## 2021-04-07 PROCEDURE — 2060000000 HC ICU INTERMEDIATE R&B

## 2021-04-07 PROCEDURE — 96366 THER/PROPH/DIAG IV INF ADDON: CPT

## 2021-04-07 PROCEDURE — 96375 TX/PRO/DX INJ NEW DRUG ADDON: CPT

## 2021-04-07 PROCEDURE — 85730 THROMBOPLASTIN TIME PARTIAL: CPT

## 2021-04-07 PROCEDURE — 84443 ASSAY THYROID STIM HORMONE: CPT

## 2021-04-07 PROCEDURE — 85610 PROTHROMBIN TIME: CPT

## 2021-04-07 PROCEDURE — 6360000002 HC RX W HCPCS: Performed by: NURSE PRACTITIONER

## 2021-04-07 RX ORDER — POTASSIUM CHLORIDE 20 MEQ/1
40 TABLET, EXTENDED RELEASE ORAL PRN
Status: DISCONTINUED | OUTPATIENT
Start: 2021-04-07 | End: 2021-04-08 | Stop reason: HOSPADM

## 2021-04-07 RX ORDER — ATORVASTATIN CALCIUM 10 MG/1
20 TABLET, FILM COATED ORAL DAILY
Status: DISCONTINUED | OUTPATIENT
Start: 2021-04-07 | End: 2021-04-08 | Stop reason: HOSPADM

## 2021-04-07 RX ORDER — LEVOTHYROXINE SODIUM 112 UG/1
112 TABLET ORAL DAILY
Status: DISCONTINUED | OUTPATIENT
Start: 2021-04-07 | End: 2021-04-08 | Stop reason: HOSPADM

## 2021-04-07 RX ORDER — MAGNESIUM SULFATE 1 G/100ML
1000 INJECTION INTRAVENOUS PRN
Status: DISCONTINUED | OUTPATIENT
Start: 2021-04-07 | End: 2021-04-08 | Stop reason: HOSPADM

## 2021-04-07 RX ORDER — DIGOXIN 0.25 MG/ML
125 INJECTION INTRAMUSCULAR; INTRAVENOUS EVERY 4 HOURS
Status: COMPLETED | OUTPATIENT
Start: 2021-04-07 | End: 2021-04-07

## 2021-04-07 RX ORDER — POTASSIUM CHLORIDE 7.45 MG/ML
10 INJECTION INTRAVENOUS PRN
Status: DISCONTINUED | OUTPATIENT
Start: 2021-04-07 | End: 2021-04-08 | Stop reason: HOSPADM

## 2021-04-07 RX ORDER — FAMOTIDINE 20 MG/1
20 TABLET, FILM COATED ORAL 2 TIMES DAILY
Status: DISCONTINUED | OUTPATIENT
Start: 2021-04-07 | End: 2021-04-08 | Stop reason: HOSPADM

## 2021-04-07 RX ORDER — SODIUM CHLORIDE 9 MG/ML
INJECTION, SOLUTION INTRAVENOUS CONTINUOUS
Status: DISCONTINUED | OUTPATIENT
Start: 2021-04-07 | End: 2021-04-08 | Stop reason: HOSPADM

## 2021-04-07 RX ADMIN — POTASSIUM BICARBONATE 40 MEQ: 782 TABLET, EFFERVESCENT ORAL at 08:11

## 2021-04-07 RX ADMIN — SODIUM CHLORIDE: 9 INJECTION, SOLUTION INTRAVENOUS at 14:06

## 2021-04-07 RX ADMIN — DIGOXIN 125 MCG: 250 INJECTION, SOLUTION INTRAMUSCULAR; INTRAVENOUS; PARENTERAL at 10:23

## 2021-04-07 RX ADMIN — FAMOTIDINE 20 MG: 20 TABLET ORAL at 20:36

## 2021-04-07 RX ADMIN — LEVOTHYROXINE SODIUM 112 MCG: 112 TABLET ORAL at 10:33

## 2021-04-07 RX ADMIN — DIGOXIN 125 MCG: 250 INJECTION, SOLUTION INTRAMUSCULAR; INTRAVENOUS; PARENTERAL at 14:09

## 2021-04-07 RX ADMIN — ATORVASTATIN CALCIUM 20 MG: 10 TABLET, FILM COATED ORAL at 10:33

## 2021-04-07 RX ADMIN — FAMOTIDINE 20 MG: 20 TABLET ORAL at 10:33

## 2021-04-07 SDOH — HEALTH STABILITY: MENTAL HEALTH: HOW OFTEN DO YOU HAVE A DRINK CONTAINING ALCOHOL?: 2-4 TIMES A MONTH

## 2021-04-07 SDOH — HEALTH STABILITY: MENTAL HEALTH: HOW MANY STANDARD DRINKS CONTAINING ALCOHOL DO YOU HAVE ON A TYPICAL DAY?: 1 OR 2

## 2021-04-07 ASSESSMENT — ENCOUNTER SYMPTOMS
SHORTNESS OF BREATH: 1
VOMITING: 0
COUGH: 0
CONSTIPATION: 0
NAUSEA: 1
BLOOD IN STOOL: 0
COUGH: 1
STRIDOR: 0
ABDOMINAL PAIN: 0
NAUSEA: 0
WHEEZING: 0
DIARRHEA: 1

## 2021-04-07 NOTE — CONSULTS
[facility]    Consult                        Today's Date: 4/7/2021  Patient Name: Jd Mccabe  Date of admission: 4/6/2021  6:51 PM  Patient's age: 72 y.o., 1955  Admission Dx: New onset a-fib [facility]) [I48.91]    Reason for Consult:  Cardiac evaluation    Requesting Physician: Maia Carter DO    CHIEF COMPLAINT:  Afib     History Obtained From:  patient, electronic medical record    HISTORY OF PRESENT ILLNESS:      Jd Mccabe is a 72 y.o. male who presents with chills fatigue body aches diarrhea. The patient dates on Saturday he started developing chills associated with this he has fatigue he noticed shortness of breath with exertion has overall body aches he has had loose stools he has had decreased smell and taste denies being exposed to anybody that he knows has had Covid some nausea but no vomiting no chest pain no headache exertion seems to make his symptoms worse nothing makes it better    Pt seen and examined in the room. Pt very weak. He states that he has no energy. He is exhausted from slight movements. He is currently rate controlled in afib. BP on low side. Pt states that he takes BP medications at home but they are currently on hold. Past Medical History:   has a past medical history of Hyperlipidemia, Hypertension, and Thyroid disease. Past Surgical History:   has a past surgical history that includes hernia repair. Home Medications:    Prior to Admission medications    Medication Sig Start Date End Date Taking?  Authorizing Provider   Ascorbic Acid (VITAMIN C) 250 MG tablet Take 1,000 mg by mouth daily   Yes Historical Provider, MD   levothyroxine (SYNTHROID) 112 MCG tablet TAKE ONE TABLET BY MOUTH ONCE DAILY 1/6/21  Yes Judith Pace MD   atorvastatin (LIPITOR) 20 MG tablet TAKE ONE TABLET BY MOUTH ONCE DAILY 1/6/21  Yes Judith Pace MD   valsartan-hydrochlorothiazide (DIOVAN-HCT) 160-12.5 MG per tablet One daily 1/6/21  Yes Swapnil Dewey auscultation bilaterally  Cardiovascular:  · Heart tones are crisp and normal. regular S1 and S2. Afib   · Jugular venous pulsation Normal  · The carotid upstroke is normal in amplitude and contour without delay or bruit   Abdomen:   · soft  · Bowel sounds present  Extremities:  ·  No edema  Neurological:  · Alert and oriented. DATA:    Diagnostics:    EKG:atrial fibrillation, rate 80, unchanged from previous tracings. ECHO: awaiting results   Stress Test: not obtained. Cardiac Angiography: not obtained. Labs:     CBC:   Recent Labs     04/06/21  1037 04/06/21  1942   WBC 7.7 7.2   HGB 16.0 14.7   HCT 47.6 44.1    173     BMP:   Recent Labs     04/06/21  1037 04/07/21  0534    139   K 3.9 3.5*   CO2 24 26   BUN 32* 24*   CREATININE 1.30* 1.08   LABGLOM 55* >60   GLUCOSE 111* 98     BNP: No results for input(s): BNP in the last 72 hours.   PT/INR:   Recent Labs     04/06/21  1037 04/07/21  0534   PROTIME 9.8 10.0   INR 0.9 1.0     APTT:  Recent Labs     04/07/21  0144 04/07/21  0837   APTT 52.5* 50.9*     CARDIAC ENZYMES:  Recent Labs     04/06/21  1037   CKTOTAL 135   CKMB 2.2     FASTING LIPID PANEL:  Lab Results   Component Value Date    HDL 41 06/02/2020    LDLCALC 105 06/05/2017    TRIG 58 06/02/2020     LIVER PROFILE:  Recent Labs     04/06/21  1037 04/07/21  0534   AST 33 22   ALT 23 18   LABALBU 3.8 3.4*       IMPRESSION:    Patient Active Problem List   Diagnosis    Hyperlipidemia    Hypertension    Hypothyroidism    Epistaxis    Nevus    Basal cell carcinoma of nose    Hypogonadism in male    Localized edema    Atrial fibrillation with RVR (HCC)    New onset a-fib (HCC)     [id number] Score for Atrial Fibrillation Stroke Risk   Risk   Factors  Component Value   C CHF No 0   H HTN Yes 1   A2 Age >= 76 No,  (72 y.o.) 0   D DM No 0   S2 Prior Stroke/TIA No 0   V Vascular Disease No 0   A Age 74-69 Yes,  (66 y.o.) 1   Sc Sex male 0    [id number]  Score  2   Score last updated 4/7/21 0:61 AM EDT    Click here for a link to the UpToDate guideline \"Atrial Fibrillation: Anticoagulation therapy to prevent embolization    Disclaimer: Risk Score calculation is dependent on accuracy of patient problem list and past encounter diagnosis. RECOMMENDATIONS:  1. New onset afib. Rate stable. On heparin gtt. Will give IV dig today. No BB due to BB on low side. On BB at home. Pt very symptomatic to afib currently although rate is stable. 2. Await ECHO results. 3. Keep K> 4 and Mag > 2   4. Based on ECHO results and symptoms, will consider Carlos Enrique/CV inpt vs. Outpt. Discussed with patient and Nurse. Richard Song, [address]  Cardiology Consult           [phone]  Attending Cardiologist Addendum: I have reviewed and performed the history, physical, subjective, objective, assessment, and plan with the resident/fellow/NP and agree with the note. I performed the history and physical personally. I have made changes to the note above as needed. Thank you for allowing me to participate in the care of this patient, please do not hesitate to call if you have any questions. Theoplis Query, [id number] [facility]  [facility] [place]  [phone]

## 2021-04-07 NOTE — CARE COORDINATION
Case Management Initial Discharge Plan  Fabi Gregg,             Met with:patient to discuss discharge plans. Information verified: address, contacts, phone number, , insurance Yes    Emergency Contact/Next of Kin name & number: s/o Monica Goodrich - [phone]    PCP: Gwen Isabel MD  Date of last visit: past year    Insurance Provider: Bx    Discharge Planning    Living Arrangements:  Spouse/Significant Other   Support Systems:  Spouse/Significant Other, Family Members, [id number] has 1 stories   stairs to climb to get into front door, stairs to climb to reach second floor  Location of bedroom/bathroom in home     Patient able to perform ADL's:Independent    Current Services (outpatient & in home) none  DME equipment:   DME provider:     Receiving oral anticoagulation therapy? No    If indicated:   Physician managing anticoagulation treatment:   Where does patient obtain lab work for ATC treatment? Potential Assistance Needed:  N/A    Patient agreeable to home care: No  [place] of choice provided:  no    Prior SNF/Rehab Placement and Facility: no  Agreeable to SNF/Rehab: No  [place] of choice provided: no     Evaluation: no    Expected Discharge date:  21    Patient expects to be discharged to:  Home  Follow Up Appointment: Best Day/ Time: Monday AM    Transportation provider: self  Transportation arrangements needed for discharge: No    Readmission Risk              Risk of Unplanned Readmission:        11             Does patient have a readmission risk score greater than 14?: No  If yes, follow-up appointment must be made within 7 days of discharge.      Goals of Care: manage heart rate/rythmn       Discharge Plan: home, independent - follow for [facility] plan          Electronically signed by Ebony Vargas RN on 21 at 12:33 PM EDT

## 2021-04-07 NOTE — H&P
[facility]  Office: [address], DO, Jason Lowery, DO, Zane Schwartz, DO, Rosalinabrody Fernandez, DO, Erin Rizo MD, Mariah Jorge MD, Michel Sanders MD, Jeanine Thompson MD, Leni Schroeder MD, Donalynn Osler, MD, Eyad Tan MD, Mike Alvarez MD, Eddi Downing, DO, Billie Melendez MD, Burak Lorenzana DO, Luis F Soto MD,  Jim Saavedra DO, Jazz Malik MD, Cristofer Manjarrez MD, Esme De La Rosa MD, Cyndee Key MD, [facility], [facility], [facility], CNP, Adrienne Cobos, CNP, Jenny Sharp, CNS, Layla Lomas, CNP, Sheela Farr, CNP, Oleg Diaz, CNP, Cathy Grey, CNP, Xena Kunz, CNP, Kami Hope PA-C, [place], [facility], Monica Morales, CNP, Jey Pate, CNP, China Davis, CNP, Yoselin Keller, CNP, Marge Mcdowell, CNP, Jannette Hanson, [facility]   [address] Replaced by [facility]    HISTORY AND PHYSICAL EXAMINATION            Date:   4/7/2021  Patient name:  Burgess Rogel  Date of admission:  4/6/2021  6:51 PM  MRN:   [id number]  Account:  [de-identified]  YOB: 1955  PCP:    Jesenia Bach MD  Room:   [address] [place] [address]  Code Status:    Full Code    Chief Complaint:     faitigue    History Obtained From:     patient    History of Present Illness:     Burgess Rogel is a 72 y.o. Non-/non  male who presents with No chief complaint on file. and is admitted to the hospital for the management of Atrial fibrillation with RVR ([facility] [address] Patient states that Friday he worked outside on his yard for several hours and felt fine and then on Saturday morning when he woke up he had the' shakes'. He States he took a warm shower trying to feel better but it did not help. He said that he just felt very tired and it progressively got worse. He reports nausea and diarrhea throughout the day on Saturday and Sunday then improved by Monday. He reports shortness of breath and heart palpitations with activity since Saturday.   He Affect: Mood normal. Affect is flat. Investigations:      Laboratory Testing:  Recent Results (from the past 24 hour(s))   EKG 12 Lead    Collection Time: 04/06/21 10:18 AM   Result Value Ref Range    Ventricular Rate 74 BPM    Atrial Rate 84 BPM    QRS Duration 114 ms    Q-T Interval 372 ms    QTc Calculation (Bazett) 412 ms    R Axis -8 degrees    T Axis -36 degrees   COVID-19, Rapid    Collection Time: 04/06/21 10:32 AM    Specimen: Nasopharyngeal Swab   Result Value Ref Range    Specimen Description . NASOPHARYNGEAL SWAB     SARS-CoV-2, Rapid Not Detected Not Detected   Rapid Influenza A/B Antigens    Collection Time: 04/06/21 10:32 AM    Specimen: Nasopharyngeal Swab   Result Value Ref Range    Specimen Description . NASOPHARYNGEAL SWAB     Special Requests NOT REPORTED     Direct Exam       NEGATIVE for Influenza A + B antigens. PCR testing to confirm this result is available upon request.  Specimen will be saved in the laboratory for 7 days. Please call [phone] if PCR testing is indicated.    Basic Metabolic Panel    Collection Time: 04/06/21 10:37 AM   Result Value Ref Range    Glucose 111 (H) 70 - 99 mg/dL    BUN 32 (H) 8 - 23 mg/dL    CREATININE 1.30 (H) 0.70 - 1.20 mg/dL    Bun/Cre Ratio NOT REPORTED 9 - 20    Calcium 9.7 8.6 - 10.4 mg/dL    Sodium 136 135 - 144 mmol/L    Potassium 3.9 3.7 - 5.3 mmol/L    Chloride 100 98 - 107 mmol/L    CO2 24 20 - 31 mmol/L    Anion Gap 12 9 - 17 mmol/L    GFR Non-African American 55 (L) >60 mL/min    GFR African American >60 >60 mL/min    GFR Comment          GFR Staging NOT REPORTED    CBC Auto Differential    Collection Time: 04/06/21 10:37 AM   Result Value Ref Range    WBC 7.7 3.5 - 11.0 k/uL    RBC 5.28 4.5 - 5.9 m/uL    Hemoglobin 16.0 13.5 - 17.5 g/dL    Hematocrit 47.6 41 - 53 %    MCV 90.1 80 - 100 fL    MCH 30.4 26 - 34 pg    MCHC 33.7 31 - 37 g/dL    RDW 13.5 12.5 - 15.4 %    Platelets 253 164 - 152 k/uL    MPV 8.9 6.0 - 12.0 fL    NRBC Automated NOT REPORTED per 100 WBC    Differential Type NOT REPORTED     Seg Neutrophils 73 (H) 36 - 66 %    Lymphocytes 14 (L) 24 - 44 %    Monocytes 12 (H) 2 - 11 %    Eosinophils % 1 1 - 4 %    Basophils 0 0 - 2 %    Immature Granulocytes NOT REPORTED 0 %    Segs Absolute 5.60 1.8 - 7.7 k/uL    Absolute Lymph # 1.10 1.0 - 4.8 k/uL    Absolute Mono # 0.90 0.1 - 1.2 k/uL    Absolute Eos # 0.10 0.0 - 0.4 k/uL    Basophils Absolute 0.00 0.0 - 0.2 k/uL    Absolute Immature Granulocyte NOT REPORTED 0.00 - 0.30 k/uL    WBC Morphology NOT REPORTED     RBC Morphology NOT REPORTED     Platelet Estimate NOT REPORTED    Hepatic Function Panel    Collection Time: 04/06/21 10:37 AM   Result Value Ref Range    Albumin 3.8 3.5 - 5.2 g/dL    Alkaline Phosphatase 56 40 - 129 U/L    ALT 23 5 - 41 U/L    AST 33 <40 U/L    Total Bilirubin 0.90 0.3 - 1.2 mg/dL    Bilirubin, Direct 0.18 <0.31 mg/dL    Bilirubin, Indirect 0.72 0.00 - 1.00 mg/dL    Total Protein 7.0 6.4 - 8.3 g/dL    Globulin NOT REPORTED 1.5 - 3.8 g/dL    Albumin/Globulin Ratio 1.2 1.0 - 2.5   Lipase    Collection Time: 04/06/21 10:37 AM   Result Value Ref Range    Lipase 158 (H) 13 - 60 U/L   Troponin    Collection Time: 04/06/21 10:37 AM   Result Value Ref Range    Troponin, High Sensitivity 20 0 - 22 ng/L    Troponin T NOT REPORTED <0.03 ng/mL    Troponin Interp NOT REPORTED    Lactate, Sepsis    Collection Time: 04/06/21 10:37 AM   Result Value Ref Range    Lactic Acid, Sepsis 1.6 0.5 - 1.9 mmol/L    Lactic Acid, Sepsis, Whole Blood NOT REPORTED 0.5 - 1.9 mmol/L   D-Dimer, Quantitative    Collection Time: 04/06/21 10:37 AM   Result Value Ref Range    D-Dimer, Quant 4.55 mg/L FEU   Protime-INR    Collection Time: 04/06/21 10:37 AM   Result Value Ref Range    Protime 9.8 9.4 - 12.6 sec    INR 0.9    APTT    Collection Time: 04/06/21 10:37 AM   Result Value Ref Range    PTT 27.3 21.3 - 31.3 sec   Brain Natriuretic Peptide    Collection Time: 04/06/21 10:37 AM   Result Value Ref Range    Pro-BNP 2,181 (H) <300 pg/mL    BNP Interpretation Pro-BNP Reference Range:    CK    Collection Time: 04/06/21 10:37 AM   Result Value Ref Range    Total  39 - 308 U/L   CK MB    Collection Time: 04/06/21 10:37 AM   Result Value Ref Range    CK-MB 2.2 <10.5 ng/mL   Magnesium    Collection Time: 04/06/21 10:37 AM   Result Value Ref Range    Magnesium 1.9 1.6 - 2.6 mg/dL   Myoglobin, Serum    Collection Time: 04/06/21 10:37 AM   Result Value Ref Range    Myoglobin 101 (H) 28 - 72 ng/mL   Culture, Blood 1    Collection Time: 04/06/21 10:40 AM    Specimen: Blood   Result Value Ref Range    Specimen Description . BLOOD     Special Requests RIGHT ANTECUBE 20CC     Culture NO GROWTH 16 HOURS    Culture, Blood 1    Collection Time: 04/06/21 11:03 AM    Specimen: Blood   Result Value Ref Range    Specimen Description . BLOOD     Special Requests LEFT ANTECUBE 20CC     Culture NO GROWTH 16 HOURS    Urinalysis Reflex to Culture    Collection Time: 04/06/21 12:32 PM   Result Value Ref Range    Color, UA YELLOW YELLOW    Turbidity UA CLEAR CLEAR    Glucose, Ur NEGATIVE NEGATIVE    Bilirubin Urine NEGATIVE NEGATIVE    Ketones, Urine MODERATE (A) NEGATIVE    Specific Gravity, UA 1.015 1.005 - 1.030    Urine Hgb NEGATIVE NEGATIVE    pH, UA 6.0 5.0 - 8.0    Protein, UA 2+ (A) NEGATIVE    Urobilinogen, Urine ELEVATED (A) Normal    Nitrite, Urine NEGATIVE NEGATIVE    Leukocyte Esterase, Urine NEGATIVE NEGATIVE    Urinalysis Comments NOT REPORTED    Microscopic Urinalysis    Collection Time: 04/06/21 12:32 PM   Result Value Ref Range    -          WBC, UA 2 TO 5 0 - 5 /HPF    RBC, UA None 0 - 2 /HPF    Casts UA 5 TO 10 0 - 2 /LPF    Casts UA HYALINE 0 - 2 /LPF    Crystals, UA NOT REPORTED None /HPF    Epithelial Cells UA 20 TO 50 0 - 5 /HPF    Renal Epithelial, UA NOT REPORTED 0 /HPF    Bacteria, UA None None    Mucus, UA NOT REPORTED None    Trichomonas, UA NOT REPORTED None Amorphous, UA NOT REPORTED None    Other Observations UA (A) NOT REQ. Utilizing a urinalysis as the only screening method to exclude a potential uropathogen can be unreliable in many patient populations. Rapid screening tests are less sensitive than culture and if UTI is a clinical possibility, culture should be considered despite a negative urinalysis.     Yeast, UA NOT REPORTED None   Troponin    Collection Time: 04/06/21  7:42 PM   Result Value Ref Range    Troponin, High Sensitivity 16 0 - 22 ng/L    Troponin T NOT REPORTED <0.03 ng/mL    Troponin Interp NOT REPORTED    CBC    Collection Time: 04/06/21  7:42 PM   Result Value Ref Range    WBC 7.2 3.5 - 11.0 k/uL    RBC 4.91 4.5 - 5.9 m/uL    Hemoglobin 14.7 13.5 - 17.5 g/dL    Hematocrit 44.1 41 - 53 %    MCV 89.9 80 - 100 fL    MCH 29.9 26 - 34 pg    MCHC 33.3 31 - 37 g/dL    RDW 13.9 12.5 - 15.4 %    Platelets 629 900 - 433 k/uL    MPV 8.8 6.0 - 12.0 fL    NRBC Automated NOT REPORTED per 100 WBC   APTT    Collection Time: 04/06/21  7:42 PM   Result Value Ref Range    PTT 27.7 21.3 - 31.3 sec   Troponin    Collection Time: 04/06/21 11:17 PM   Result Value Ref Range    Troponin, High Sensitivity 15 0 - 22 ng/L    Troponin T NOT REPORTED <0.03 ng/mL    Troponin Interp NOT REPORTED    APTT    Collection Time: 04/07/21  1:44 AM   Result Value Ref Range    PTT 52.5 (H) 21.3 - 31.3 sec   Comprehensive Metabolic Panel w/ Reflex to MG    Collection Time: 04/07/21  5:34 AM   Result Value Ref Range    Glucose 98 70 - 99 mg/dL    BUN 24 (H) 8 - 23 mg/dL    CREATININE 1.08 0.70 - 1.20 mg/dL    Bun/Cre Ratio NOT REPORTED 9 - 20    Calcium 9.2 8.6 - 10.4 mg/dL    Sodium 139 135 - 144 mmol/L    Potassium 3.5 (L) 3.7 - 5.3 mmol/L    Chloride 104 98 - 107 mmol/L    CO2 26 20 - 31 mmol/L    Anion Gap 9 9 - 17 mmol/L    Alkaline Phosphatase 49 40 - 129 U/L    ALT 18 5 - 41 U/L    AST 22 <40 U/L    Total Bilirubin 0.67 0.3 - 1.2 mg/dL    Total Protein 6.1 (L) 6.4 - 8.3 g/dL synthroid. Check TSH    Venous Doppler of left lower extremity to rule out DVT    Cardiac diet    Blood cultures x2 pending from ER visit and [place]    Consultations:   IP CONSULT TO CARDIOLOGY     Patient is admitted as inpatient status because of co-morbidities listed above, severity of signs and symptoms as outlined, requirement for current medical therapies and most importantly because of direct risk to patient if care not provided in a hospital setting. Expected length of stay > 48 hours.     MYRNA Lindsey - CNP  4/7/2021  10:18 AM    Copy sent to Dr. Darrian Cannon MD

## 2021-04-07 NOTE — PLAN OF CARE
Problem: Falls - Risk of:  Goal: Will remain free from falls  Description: Will remain free from falls  Outcome: Ongoing   The patient remained free from falls this shift, call light within reach, bed in locked and lowest position. Side rails up x2. Continue to monitor closely. Problem: Activity:  Goal: Ability to tolerate increased activity will improve  Description: Ability to tolerate increased activity will improve  Outcome: Ongoing     Problem: Cardiac:  Goal: Ability to maintain an adequate cardiac output will improve  Description: Ability to maintain an adequate cardiac output will improve  Outcome: Ongoing   Patient on heparin drip (See Mar).      Problem: Coping:  Goal: Level of anxiety will decrease  Description: Level of anxiety will decrease  Outcome: Ongoing     Problem: Health Behavior:  Goal: Ability to manage health-related needs will improve  Description: Ability to manage health-related needs will improve  Outcome: Ongoing     Problem: Safety:  Goal: Ability to remain free from injury will improve  Description: Ability to remain free from injury will improve  Outcome: Ongoing

## 2021-04-07 NOTE — PROGRESS NOTES
Comprehensive Nutrition Assessment    Type and Reason for Visit:  Positive Nutrition Screen(nausea/vomiting/poor appetite)    Nutrition Recommendations/Plan:   Continue with current diet and follow for adequacy of intakes/diet education needs    Nutrition Assessment:  Pt nutritionally compromised due to stated 3 day hx of nausea & diarrhea. Pt states he has also lost wt with his current wt 10lb less than his usual wt. Does not follow any special diet at home. Current diet restrictions discussed. States he is eating well now and has a good appetite. Nausea/Diarrhea has resolved. Will follow for any nutritional concerns and adquacy of intakes. Malnutrition Assessment:  Malnutrition Status:  Insufficient data    Context:  Acute Illness     Findings of the 6 clinical characteristics of malnutrition:  Energy Intake:  Mild decrease in energy intake (Comment)(stated over the past 2-3 days)  Weight Loss:  7 - Greater than 5% over 1 month     Body Fat Loss:  Unable to assess     Muscle Mass Loss:  Unable to assess    Fluid Accumulation:  No significant fluid accumulation     Strength:  Not Performed    Estimated Daily Nutrient Needs:  Energy (kcal):  5754-4653 kcal/day; Weight Used for Energy Requirements:  Current     Protein (g):  95 g/day(1 g/kg); Weight Used for Protein Requirements:  Current        Fluid (ml/day):  6194-0960 ml/day; Method Used for Fluid Requirements:  1 ml/kcal      Nutrition Related Findings:  good appetite, resolved nausea/diarrhea      Wounds:  None       Current Nutrition Therapies:    DIET CARDIAC; Anthropometric Measures:  · Height: 6' 3\" (190.5 cm)  · Current Body Weight: 211 lb (95.7 kg)   · Admission Body Weight: 215 lb (97.5 kg)    · Usual Body Weight: 225 lb (102.1 kg)     · Ideal Body Weight: 196 lbs; % Ideal Body Weight 107.7 %   · BMI: 26.4  · Adjusted Body Weight:  ; No Adjustment   · BMI Categories: Overweight (BMI 25.0-29. 9)       Nutrition Diagnosis:   · Inadequate

## 2021-04-08 ENCOUNTER — APPOINTMENT (OUTPATIENT)
Dept: GENERAL RADIOLOGY | Age: 66
DRG: 310 | End: 2021-04-08
Attending: INTERNAL MEDICINE
Payer: COMMERCIAL

## 2021-04-08 VITALS
OXYGEN SATURATION: 96 % | TEMPERATURE: 97.8 F | WEIGHT: 212.74 LBS | HEART RATE: 78 BPM | BODY MASS INDEX: 26.45 KG/M2 | DIASTOLIC BLOOD PRESSURE: 65 MMHG | SYSTOLIC BLOOD PRESSURE: 103 MMHG | HEIGHT: 75 IN | RESPIRATION RATE: 18 BRPM

## 2021-04-08 PROBLEM — I51.89 LEFT VENTRICULAR SYSTOLIC DYSFUNCTION: Status: ACTIVE | Noted: 2021-04-08

## 2021-04-08 PROBLEM — I51.9 LEFT VENTRICULAR SYSTOLIC DYSFUNCTION: Status: ACTIVE | Noted: 2021-04-08

## 2021-04-08 PROBLEM — E78.6 LOW HDL (UNDER 40): Status: ACTIVE | Noted: 2021-04-08

## 2021-04-08 LAB
ANION GAP SERPL CALCULATED.3IONS-SCNC: 9 MMOL/L (ref 9–17)
BUN BLDV-MCNC: 18 MG/DL (ref 8–23)
BUN/CREAT BLD: ABNORMAL (ref 9–20)
CALCIUM SERPL-MCNC: 8.5 MG/DL (ref 8.6–10.4)
CHLORIDE BLD-SCNC: 106 MMOL/L (ref 98–107)
CHOLESTEROL/HDL RATIO: 4
CHOLESTEROL: 128 MG/DL
CO2: 25 MMOL/L (ref 20–31)
CREAT SERPL-MCNC: 0.99 MG/DL (ref 0.7–1.2)
GFR AFRICAN AMERICAN: >60 ML/MIN
GFR NON-AFRICAN AMERICAN: >60 ML/MIN
GFR SERPL CREATININE-BSD FRML MDRD: ABNORMAL ML/MIN/{1.73_M2}
GFR SERPL CREATININE-BSD FRML MDRD: ABNORMAL ML/MIN/{1.73_M2}
GLUCOSE BLD-MCNC: 98 MG/DL (ref 70–99)
HCT VFR BLD CALC: 41.6 % (ref 41–53)
HDLC SERPL-MCNC: 32 MG/DL
HEMOGLOBIN: 14 G/DL (ref 13.5–17.5)
LDL CHOLESTEROL: 75 MG/DL (ref 0–130)
MAGNESIUM: 1.7 MG/DL (ref 1.6–2.6)
MCH RBC QN AUTO: 30.2 PG (ref 26–34)
MCHC RBC AUTO-ENTMCNC: 33.7 G/DL (ref 31–37)
MCV RBC AUTO: 89.6 FL (ref 80–100)
NRBC AUTOMATED: NORMAL PER 100 WBC
PARTIAL THROMBOPLASTIN TIME: 55 SEC (ref 21.3–31.3)
PDW BLD-RTO: 13.8 % (ref 12.5–15.4)
PLATELET # BLD: 175 K/UL (ref 140–450)
PMV BLD AUTO: 8.8 FL (ref 6–12)
POTASSIUM SERPL-SCNC: 3.8 MMOL/L (ref 3.7–5.3)
RBC # BLD: 4.64 M/UL (ref 4.5–5.9)
SODIUM BLD-SCNC: 140 MMOL/L (ref 135–144)
TRIGL SERPL-MCNC: 104 MG/DL
VLDLC SERPL CALC-MCNC: ABNORMAL MG/DL (ref 1–30)
WBC # BLD: 7.2 K/UL (ref 3.5–11)

## 2021-04-08 PROCEDURE — 83735 ASSAY OF MAGNESIUM: CPT

## 2021-04-08 PROCEDURE — 96366 THER/PROPH/DIAG IV INF ADDON: CPT

## 2021-04-08 PROCEDURE — 96376 TX/PRO/DX INJ SAME DRUG ADON: CPT

## 2021-04-08 PROCEDURE — 80061 LIPID PANEL: CPT

## 2021-04-08 PROCEDURE — 36415 COLL VENOUS BLD VENIPUNCTURE: CPT

## 2021-04-08 PROCEDURE — 99238 HOSP IP/OBS DSCHRG MGMT 30/<: CPT | Performed by: INTERNAL MEDICINE

## 2021-04-08 PROCEDURE — G0378 HOSPITAL OBSERVATION PER HR: HCPCS

## 2021-04-08 PROCEDURE — 6370000000 HC RX 637 (ALT 250 FOR IP): Performed by: NURSE PRACTITIONER

## 2021-04-08 PROCEDURE — 71045 X-RAY EXAM CHEST 1 VIEW: CPT

## 2021-04-08 PROCEDURE — 2580000003 HC RX 258: Performed by: NURSE PRACTITIONER

## 2021-04-08 PROCEDURE — 80048 BASIC METABOLIC PNL TOTAL CA: CPT

## 2021-04-08 PROCEDURE — 85730 THROMBOPLASTIN TIME PARTIAL: CPT

## 2021-04-08 PROCEDURE — 85027 COMPLETE CBC AUTOMATED: CPT

## 2021-04-08 RX ORDER — METOPROLOL SUCCINATE 25 MG/1
25 TABLET, EXTENDED RELEASE ORAL DAILY
Status: DISCONTINUED | OUTPATIENT
Start: 2021-04-08 | End: 2021-04-08 | Stop reason: HOSPADM

## 2021-04-08 RX ADMIN — SODIUM CHLORIDE: 9 INJECTION, SOLUTION INTRAVENOUS at 00:25

## 2021-04-08 RX ADMIN — FAMOTIDINE 20 MG: 20 TABLET ORAL at 09:10

## 2021-04-08 RX ADMIN — ATORVASTATIN CALCIUM 20 MG: 10 TABLET, FILM COATED ORAL at 09:10

## 2021-04-08 RX ADMIN — METOPROLOL SUCCINATE 25 MG: 25 TABLET, EXTENDED RELEASE ORAL at 15:06

## 2021-04-08 RX ADMIN — LEVOTHYROXINE SODIUM 112 MCG: 112 TABLET ORAL at 09:10

## 2021-04-08 RX ADMIN — RIVAROXABAN 20 MG: 10 TABLET, FILM COATED ORAL at 15:06

## 2021-04-08 ASSESSMENT — ENCOUNTER SYMPTOMS
VOMITING: 0
SHORTNESS OF BREATH: 0
NAUSEA: 0
COUGH: 0
WHEEZING: 0
ABDOMINAL PAIN: 0

## 2021-04-08 NOTE — PROGRESS NOTES
[facility]    Office: [address], DO, Jose Jaimes, DO, Karol Keating, DO, Dirk Rashmi Blood, DO, Sathish Jimenez MD, Jose Richmond MD, Tiffany Nguyen MD, Roque Wisdom MD, Colt Stevenson MD, Latia Woodard MD, Charlotte Rodrigues MD, Anne Mart MD, Tana Higgins MD, Gala Alejandre, DO, Eliza Keating MD, Miguel Ángel Butler MD, Tran Welch DO, Alcides Howard MD,  Ayse Gutierrez DO, Barby Ken MD, Aurea Palencia MD, Letty Pineda [facility], [facility], CNP, Josee Brown, CNP, Rodríguez Patel, CNS, Krystyna Yancey, [facility], Beryle Fix, CNP, Brittaney Faustin, CNP, Noreen Guthrie, CNP, Kike Jenkins, CNP, Rach Juarez PA-C, Donta Cruz, [facility], Jaden Tan, CNP, Yuniel Davis, CNP, Tanja Corey, CNP, Trinidad Tai, CNP, Maite [address]    Progress Note    4/8/2021    3:53 PM    Name:   Janeth Bean  MRN:     [id number]     Acct:      [de-identified]   Room:   [address] [place] [address] Day:  2  Admit Date:  4/6/2021  6:51 PM    PCP:   Ann Lee MD  Code Status:  Full Code    Subjective:     C/C:   Fatigue  Feels  Diarrhea  Body aches    Interval History Status:  Improved  Ambulating  Up to chair  Feels that he is back to baseline  Exercise capacity satisfactory  Hopes to go home  Rate controlled  Xarelto initiated    Data Base Updates:  Rate controlled    Brief History:     22-year-old male admitted through the emergency room with ELIJAH joel RVR  He is not any chest pain or palpitations  Patient is experiencing body aches, fatigue, chills, diarrhea  No ill contacts  No Covid exposures  No prior history of heart disease/arrhythmia     The initial exam revealed:  Patient awake and orientated  Heart is irregular in rate and rhythm  Rate is better controlled  Lungs are clear, no rales or rhonchi /wheeze  Abdomen is soft, nontender, bowel sounds present  Extremities with mild edema, no tenderness  No gross motor or sensory week      Medications: Allergies:  No Known Allergies    Current Meds:   Scheduled Meds:    metoprolol succinate  25 mg Oral Daily    rivaroxaban  20 mg Oral Daily    atorvastatin  20 mg Oral Daily    levothyroxine  112 mcg Oral Daily    famotidine  20 mg Oral BID    sodium chloride flush  10 mL Intravenous 2 times per day     Continuous Infusions:    sodium chloride Stopped (04/08/21 1444)    sodium chloride       PRN Meds: potassium chloride **OR** potassium alternative oral replacement **OR** potassium chloride, magnesium sulfate, sodium chloride flush, sodium chloride, nicotine, promethazine **OR** ondansetron, polyethylene glycol, acetaminophen **OR** acetaminophen, metoprolol, heparin (porcine), heparin (porcine)    Data:     Past Medical History:   has a past medical history of Alcohol use, Hyperlipidemia, Hypertension, and Thyroid disease. Social History:   reports that he quit smoking about 40 years ago. He has a 13.00 pack-year smoking history. He has never used smokeless tobacco. He reports current alcohol use. He reports that he does not use drugs. Family History:   Family History   Problem Relation Age of Onset    Heart Disease Mother         OHS/ afib    COPD Father         lung disease    Alcohol Abuse Father        Review of Systems:     Review of Systems   Respiratory: Negative for cough, shortness of breath and wheezing. Cardiovascular: Negative for chest pain and palpitations. Gastrointestinal: Negative for abdominal pain, nausea and vomiting. Genitourinary: Negative for flank pain and hematuria. Neurological: Negative for weakness. Physical Examination:        Physical Exam  Vitals signs reviewed. Constitutional:       General: He is not in acute distress. Appearance: He is not diaphoretic. HENT:      Head: Normocephalic. Nose: Nose normal.   Eyes:      General: No scleral icterus.      Conjunctiva/sclera: Conjunctivae normal.   Neck: Musculoskeletal: Neck supple. Trachea: No tracheal deviation. Cardiovascular:      Rate and Rhythm: Normal rate. Rhythm irregular. Pulmonary:      Effort: Pulmonary effort is normal. No respiratory distress. Breath sounds: Normal breath sounds. No wheezing or rales. Chest:      Chest wall: No tenderness. Abdominal:      General: Bowel sounds are normal. There is no distension. Palpations: Abdomen is soft. Tenderness: There is no abdominal tenderness. Musculoskeletal:         General: No tenderness. Right lower leg: Edema present. Left lower leg: Edema present. Comments: 1/4 edema at the ankles   Skin:     General: Skin is warm and dry. Neurological:      Mental Status: He is alert and oriented to person, place, and time. Vitals:  /65   Pulse 78   Temp 97.8 °F (36.6 °C) (Oral)   Resp 18   Ht 6' 3\" (1.905 m)   Wt 212 lb 11.9 oz (96.5 kg)   SpO2 96%   BMI 26.59 kg/m²   Temp (24hrs), Av.7 °F (36.5 °C), Min:97.4 °F (36.3 °C), Max:97.8 °F (36.6 °C)    No results for input(s): POCGLU in the last 72 hours. I/O (24Hr):     Intake/Output Summary (Last 24 hours) at 2021 1553  Last data filed at 2021 1445  Gross per 24 hour   Intake 2463.2 ml   Output 1300 ml   Net 1163.2 ml       Labs:  Hematology:  Recent Labs     21  1037 21  1942 21  0534 21  0549   WBC 7.7 7.2  --  7.2   RBC 5.28 4.91  --  4.64   HGB 16.0 14.7  --  14.0   HCT 47.6 44.1  --  41.6   MCV 90.1 89.9  --  89.6   MCH 30.4 29.9  --  30.2   MCHC 33.7 33.3  --  33.7   RDW 13.5 13.9  --  13.8    173  --  175   MPV 8.9 8.8  --  8.8   INR 0.9  --  1.0  --    DDIMER 4.55  --   --   --      Chemistry:  Recent Labs     21  1037 21  1942 21  2317 21  0534 21  0549     --   --  139 140   K 3.9  --   --  3.5* 3.8     --   --  104 106   CO2 24  --   --  26 25   GLUCOSE 111*  --   --  98 98   BUN 32*  --   --  24* 18 CREATININE 1.30*  --   --  1.08 0.99   MG 1.9  --   --  1.8 1.7   ANIONGAP 12  --   --  9 9   LABGLOM 55*  --   --  >60 >60   GFRAA >60  --   --  >60 >60   CALCIUM 9.7  --   --  9.2 8.5*   PROBNP 2,181*  --   --  1,397*  --    TROPHS 20 16 15  --   --    CKTOTAL 135  --   --   --   --    CKMB 2.2  --   --   --   --    MYOGLOBIN 101*  --   --   --   --      Recent Labs     04/06/21  1037 04/07/21  0534 04/08/21  0549   PROT 7.0 6.1*  --    LABALBU 3.8 3.4*  --    TSH  --  1.62  --    AST 33 22  --    ALT 23 18  --    ALKPHOS 56 49  --    BILITOT 0.90 0.67  --    BILIDIR 0.18  --   --    LIPASE 158*  --   --    CHOL  --   --  128   HDL  --   --  32*   LDLCHOLESTEROL  --   --  75   CHOLHDLRATIO  --   --  4.0   TRIG  --   --  104   VLDL  --   --  NOT REPORTED     ABG:No results found for: POCPH, PHART, PH, POCPCO2, QYM1TUN, PCO2, POCPO2, PO2ART, PO2, POCHCO3, BFC3FHC, HCO3, NBEA, PBEA, BEART, BE, THGBART, THB, RFP3KVP, UCWZ6NXM, U6AHUGXW, O2SAT, FIO2  Lab Results   Component Value Date/Time    SPECIAL [facility] 04/06/2021 11:03 AM     Lab Results   Component Value Date/Time    CULTURE NO GROWTH 2 DAYS 04/06/2021 11:03 AM       Radiology:  Jeanine Rice Chest Portable    Result Date: 4/8/2021  No acute cardiopulmonary findings     Ct Chest Pulmonary Embolism W Contrast    Result Date: 4/6/2021  No pulmonary embolism or acute pulmonary abnormality. Cardiomegaly and coronary artery disease. Us Dup Lower Extremity Left Shahram    Result Date: 4/7/2021  No evidence of DVT in the left lower extremity. Assessment:        Principal Problem:    Atrial fibrillation with RVR (HCC)  Active Problems:    Hyperlipidemia    Hypertension    Hypothyroidism    Localized edema    Alcohol use    Acute renal insufficiency    Dehydration    Azotemia    Elevated brain natriuretic peptide (BNP) level    Low HDL (under 40)    Left ventricular systolic dysfunction  Resolved Problems:    * No resolved hospital problems.  *      Plan: Discharge planning  Rate control   Heparin initiated, transition to Xarelto  Minimize coffee intake  Minimize tea intake  Continue alcohol abstinence  Cardiology consultation and f/u as scheduled  Outpatient stress test  Possible ABDIEL/CV  Monitor BUN and creatinine  Monitor blood pressure  Thyroid replacement titrated to normalize thyroid hormones   Lipitor  Risk factor management   Will discharge when arrangements complete and ok with other services.   Follow-up with PCP in one week, Cecily Jennings MD  Notify PCP of discharge     IP CONSULT TO DO Ashley  4/8/2021  3:53 PM

## 2021-04-08 NOTE — PROGRESS NOTES
[facility]   Progress Note                   Date:   4/8/2021  Patient name: Celina Kendall  Date of admission:  4/6/2021  6:51 PM  MRN:   [id number]  YOB: 1955  PCP: Ediwn Jamie MD    Reason for Admission: New onset a-fib [facility]) [I48.91]    Subjective:       Clinical Changes / Abnormalities:Pt seen and examined in the room. Pt up to chair. Pt denies any CP or SOB. He is feeling much better today. Remains afib. Rate controlled. Medications:   Scheduled Meds:   atorvastatin  20 mg Oral Daily    levothyroxine  112 mcg Oral Daily    famotidine  20 mg Oral BID    sodium chloride flush  10 mL Intravenous 2 times per day     Continuous Infusions:   sodium chloride 75 mL/hr at 04/08/21 0025    sodium chloride      heparin (PORCINE) Infusion 10.2 Units/kg/hr (04/08/21 0612)     CBC:   Recent Labs     04/06/21  1037 04/06/21 1942 04/08/21  0549   WBC 7.7 7.2 7.2   HGB 16.0 14.7 14.0    173 175     BMP:    Recent Labs     04/06/21  1037 04/07/21  0534 04/08/21  0549    139 140   K 3.9 3.5* 3.8    104 106   CO2 24 26 25   BUN 32* 24* 18   CREATININE 1.30* 1.08 0.99   GLUCOSE 111* 98 98     Hepatic:   Recent Labs     04/06/21  1037 04/07/21  0534   AST 33 22   ALT 23 18   BILITOT 0.90 0.67   ALKPHOS 56 49     Troponin:   Recent Labs     04/06/21  1037 04/06/21  1942 04/06/21  2317   TROPHS 20 16 15     BNP: No results for input(s): BNP in the last 72 hours. Lipids:   Recent Labs     04/08/21  0549   CHOL 128   HDL 32*     INR:   Recent Labs     04/06/21  1037 04/07/21  0534   INR 0.9 1.0     ECHO 4/7/21  Summary  Left ventricle is normal in size Global left ventricular systolic function  is mildly reduced, EF 45-50%. Mild left ventricular hypertrophy. Aortic valve is sclerotic but opens well. There is focal calcification of  the right coronary cusp. Thickened mitral valve leaflets. Trivial to mild mitral regurgitation.   Trivial tricuspid regurgitation. Trivial to mild pulmonic insufficiency. Aortic root is mildly dilated. Objective:   Vitals: /65   Pulse 78   Temp 97.8 °F (36.6 °C) (Oral)   Resp 18   Ht 6' 3\" (1.905 m)   Wt 212 lb 11.9 oz (96.5 kg)   SpO2 96%   BMI 26.59 kg/m²   General appearance: alert and cooperative with exam  HEENT: Head: Normocephalic, no lesions, without obvious abnormality. Neck: no JVD, trachea midline, no adenopathy  Lungs: Clear to auscultation  Heart: irregular rate and rhythm, s1/s2 auscultated, no murmurs, afib   Abdomen: soft, non-tender, bowel sounds active  Extremities: no edema  Neurologic: not done        Assessment / Acute Cardiac Problems:   1. AFib   2. Hypotension     Patient Active Problem List:     Hyperlipidemia     Hypertension     Hypothyroidism     Epistaxis     Nevus     Basal cell carcinoma of nose     Hypogonadism in male     Localized edema     Atrial fibrillation with RVR (HCC)     New onset a-fib (HCC)     Alcohol use     Acute renal insufficiency     Dehydration     Azotemia     Elevated brain natriuretic peptide (BNP) level    [id number] Score for Atrial Fibrillation Stroke Risk   Risk   Factors  Component Value   C CHF No 0   H HTN Yes 1   A2 Age >= 76 No,  (66 y.o.) 0   D DM No 0   S2 Prior Stroke/TIA No 0   V Vascular Disease No 0   A Age 74-69 Yes,  (66 y.o.) 1   Sc Sex male 0    [id number]  Score  2   Score last updated 4/8/21 74:49 PM EDT    Click here for a link to the UpToDate guideline \"Atrial Fibrillation: Anticoagulation therapy to prevent embolization    Disclaimer: Risk Score calculation is dependent on accuracy of patient problem list and past encounter diagnosis. Plan of Treatment:   1. New onset afib. Rate remains stable. On heparin gtt. Will d/c heparin and start on xarelto 20mg daily. Will restart lower dose BB. Hold Diovan on discharge. Discussed ABDIEL.CV. Pt would like to hold off at this time. 2. Reviewed ECHO and discussed findings.   Discussed proceeding with stress test.  Pt states that he does now want to stay in house for stress test.  Will follow up as outpt for stress test.  He guillermina  3. Keep K > 4 and Mag > 2   4. Ok to d/c from cardiology standpoint and will follow up as outpt. If Remains in afib in 2-4 weeks, will consider CV.       Electronically signed by MYRNA Perez CNP on 4/8/2021 at 12:13 PM  [address]  [phone]

## 2021-04-08 NOTE — DISCHARGE SUMMARY
[facility]    Office: [address], DO, Michelle Zavaleta, DO, Ana María Miguelina, DO, Sathya Tejeda Jim, DO, Matilda Prieto MD, Amie Her MD, Tootie Meneses MD, Satinder Hazel MD, Chaparrita John MD, Emerald Kate MD, Kamryn Schneider MD, Siri Plunkett MD, Tana Rivera MD, Koko Pressley DO, Susie Barrera MD, Martin Segal MD, Elier Robles DO, Ricardo Sousa MD,  Graciela oMra DO, Dago Winkler MD, Anabell Kaminski MD, Genesis Stevenson, [facility], [facility] Dunn, CNP, Bret Ngo, CNP, Sergey Seaman, CNS, Luo Malik, CNP, José Kahn, CNP, Kayy Gilmore, CNP, Michelle Jaimes, CNP, Candy Rosas PA-C, Raquel Cary, CRUZ, Helen Finley, CNP, Tristan Arana, CNP, Rodrigo Kirby, CNP, Keiry Marc, CNP, Rosalinda Suh, CNP    [facility]   [facility]    Discharge Summary    Patient ID: Mikael Lanier  :  1955   MRN: [id number]     ACCOUNT:  [de-identified]   Patient's PCP: Darlin Simpson MD  Admit Date: 2021   Discharge Date: 2021    Discharge Physician: Blair Thomason DO     The patient was seen and examined on day of discharge and this discharge summary is in conjunction with any daily progress note from day of discharge.     Active Discharge Diagnoses:     Primary Problem  Atrial fibrillation with RVR [facility])      [facility] Problems    Diagnosis Date Noted    Low HDL (under 40) [E78.6] 2021    Left ventricular systolic dysfunction [R23.4] 2021    Alcohol use [Z72.89] 2021    Acute renal insufficiency [N28.9] 2021    Dehydration [E86.0] 2021    Azotemia [R79.89]     Elevated brain natriuretic peptide (BNP) level [R79.89]     Atrial fibrillation with RVR (HCC) [I48.91] 2021    Localized edema [R60.0] 10/11/2017    Hypothyroidism [E03.9] 2014    Hyperlipidemia [E78.5]     Hypertension [address] Course:     Brief History:  70-year-old male admitted through the emergency room with ELIJAH joel RVR  He is not any chest pain or palpitations  Patient is experiencing body aches, fatigue, chills, diarrhea  No ill contacts  No Covid exposures  No prior history of heart disease/arrhythmia     The initial exam revealed:  Patient awake and orientated  Heart is irregular in rate and rhythm  Rate is better controlled  Lungs are clear, no rales or rhonchi /wheeze  Abdomen is soft, nontender, bowel sounds present  Extremities with mild edema, no tenderness  No gross motor or sensory deficits  No cyanosis      Database has included:  Echo   Left ventricle is normal in size Global left ventricular systolic function   is mildly reduced, EF 45-50%. Mild left ventricular hypertrophy. Aortic valve is sclerotic but opens well. There is focal calcification of   the right coronary cusp. Thickened mitral valve leaflets. Trivial to mild mitral regurgitation. Trivial tricuspid regurgitation. Trivial to mild pulmonic insufficiency. Aortic root is mildly dilated.      Venous Doppler negative for DVT     [id number]      Pro-BNP1,397High      ONT13Agm      Results for Jeovany Dean (MRN [id number]) as of 4/8/2021 15:45    Ref. Range 6/5/2017 00:00 2/8/2019 15:36 6/2/2020 11:34 4/7/2021 05:34   TSH Latest Ref Range: 0.30 - 5.00 mIU/L 1.17 0.93 0.90 1.62   Thyroxine, Free Latest Ref Range: 0.93 - 1.70 ng/dL   1.50 1.46        Patient has been admitted  Rate control undertaken  Heparin initiated  Minimize coffee intake  Minimize tea intake  Continue alcohol abstinence  Cardiology consultation  Possible ABDIEL/CV  Monitor BUN and creatinine  Monitor blood pressure  Check thyroid function  Database in progress     Cardiology was consulted  Plan of treatment included:  1. New onset afib.  Rate remains stable.  On heparin gtt. Waneta Bring d/c heparin and start on xarelto 20mg daily.  Will restart lower dose BB.  Hold Diovan on discharge.  Discussed ABDIEL. CV.  Pt would like to hold off at this time.      2. Reviewed ECHO and discussed findings.  Discussed proceeding with stress test.  Pt states that he does now want to stay in house for stress test.  Will follow up as outpt for stress test.  He guillermina  3. Keep K > 4 and Mag > 2   4. Ok to d/c from cardiology standpoint and will follow up as outpt.  If Remains in afib in 2-4 weeks, will consider CV.       The patient responded to therapy  Their status was stabilized   DC planning was initiated  The patient was instructed to follow up with their PCP, Sabine Alfred MD in one week        Discharge plan:     Rate control   Heparin initiated, transition to [place] [address] coffee intake  Minimize tea intake  Continue alcohol abstinence  Cardiology consultation and f/u as scheduled  Outpatient stress test  Possible ABDIEL/CV  Monitor BUN and creatinine  Monitor blood pressure  Thyroid replacement titrated to normalize thyroid hormones   Lipitor  Risk factor management   Will discharge when arrangements complete and ok with other services. Follow-up with PCP in one week, Sabine Alfred MD  Notify PCP of discharge     No discharge procedures on file.     Significant Diagnostic Studies:     Labs / Micro:  Labs:  Hematology:  Recent Labs     04/06/21  1037 04/06/21 1942 04/07/21  0534 04/08/21  0549   WBC 7.7 7.2  --  7.2   RBC 5.28 4.91  --  4.64   HGB 16.0 14.7  --  14.0   HCT 47.6 44.1  --  41.6   MCV 90.1 89.9  --  89.6   MCH 30.4 29.9  --  30.2   MCHC 33.7 33.3  --  33.7   RDW 13.5 13.9  --  13.8    173  --  175   MPV 8.9 8.8  --  8.8   INR 0.9  --  1.0  --    DDIMER 4.55  --   --   --      Chemistry:  Recent Labs     04/06/21  1037 04/06/21 1942 04/06/21  2317 04/07/21  0534 04/08/21  0549     --   --  139 140   K 3.9  --   --  3.5* 3.8     --   --  104 106   CO2 24  --   --  26 25   GLUCOSE 111*  --   --  98 98   BUN 32*  --   --  24* 18   CREATININE 1.30*  --   --  1.08 0.99   MG 1.9  --   --  1.8 1.7 ANIONGAP 12  --   --  9 9   LABGLOM 55*  --   --  >60 >60   GFRAA >60  --   --  >60 >60   CALCIUM 9.7  --   --  9.2 8.5*   PROBNP 2,181*  --   --  1,397*  --    TROPHS 20 16 15  --   --    CKTOTAL 135  --   --   --   --    CKMB 2.2  --   --   --   --    MYOGLOBIN 101*  --   --   --   --      Recent Labs     04/06/21  1037 04/07/21  0534 04/08/21  0549   PROT 7.0 6.1*  --    LABALBU 3.8 3.4*  --    TSH  --  1.62  --    AST 33 22  --    ALT 23 18  --    ALKPHOS 56 49  --    BILITOT 0.90 0.67  --    BILIDIR 0.18  --   --    LIPASE 158*  --   --    CHOL  --   --  128   HDL  --   --  32*   LDLCHOLESTEROL  --   --  75   CHOLHDLRATIO  --   --  4.0   TRIG  --   --  104   VLDL  --   --  NOT REPORTED         Radiology:    Echo Complete 2d W Doppler W Color    Result Date: 4/7/2021  [address] [place] [address] Transthoracic Echocardiography Report (TTE)  Patient Name Royal      Date of Study             04/07/2021               Jamil Jiménez   Date of      1955  Gender                    Male  Birth   Age          72 year(s)  Race                         Room Number         Height:                   75 inch, 190.5 cm   Corporate ID [id number]    Weight:                   211 pounds, 95.7 kg  #   Patient Acct [de-identified]   BSA:         2.24 m^2     BMI:       26.37 kg/m^2  #   MR #         [id number]     Sonographer               Rogeliostarr Malik   Accession #  [id number]  Interpreting Physician    [address] [place] [address]   Fellow                   Referring Nurse                           Practitioner   Interpreting             Referring Physician       Maximiliano Orta  Fellow  Type of Study   TTE procedure:2D Echocardiogram, M-Mode, Doppler, Color Doppler. Procedure Date Date: 04/07/2021 Start: 09:52 AM Study Location: [facility] Technical Quality: Adequate visualization Indications:Atrial fibrillation.  Patient Status: Inpatient Height: 75 inches Weight: 211 pounds BSA: 2.24 m^2 BMI: 26.37 kg/m^2 images are provided for review. Dose modulation, iterative reconstruction, and/or weight based adjustment of the mA/kV was utilized to reduce the radiation dose to as low as reasonably achievable. COMPARISON: None. HISTORY: ORDERING SYSTEM PROVIDED HISTORY: elevated d-dimer, fatigue, viral symptoms TECHNOLOGIST PROVIDED HISTORY: elevated d-dimer, fatigue, viral symptoms Decision Support Exception->Emergency Medical Condition (MA) Reason for Exam: Nausea, fatigue, diarrhea elevated d dimer Acuity: Acute Type of Exam: Initial FINDINGS: Pulmonary Arteries: Pulmonary arteries are adequately opacified for evaluation. No intraluminal filling defect to suggest pulmonary embolism. Main pulmonary artery is normal in caliber. Mediastinum: No mediastinal lymphadenopathy. The heart and pericardium demonstrate no acute abnormality. Cardiomegaly. Coronary artery disease. There is no acute abnormality of the thoracic aorta. Lungs/pleura: The central airways are patent. No focal consolidation. No pleural effusion. No pneumothorax. Upper Abdomen: Limited images of the upper abdomen are unremarkable. Soft Tissues/Bones: No acute bone or soft tissue abnormality. No pulmonary embolism or acute pulmonary abnormality. Cardiomegaly and coronary artery disease. Us Dup Lower Extremity Left Shahram    Result Date: 4/7/2021  EXAMINATION: DUPLEX VENOUS ULTRASOUND OF THE LEFT LOWER EXTREMITY 4/7/2021 2:53 pm TECHNIQUE: Duplex ultrasound using B-mode/gray scaled imaging and Doppler spectral analysis and color flow was obtained of the left lower extremity. COMPARISON: None. HISTORY: ORDERING SYSTEM PROVIDED HISTORY: redness TECHNOLOGIST PROVIDED HISTORY: redness FINDINGS: The visualized veins of the left lower extremity are patent and free of echogenic thrombus. The veins demonstrate good compressibility with normal color flow study and spectral analysis. No evidence of DVT in the left lower extremity.          Consultations: Consults:     Final Specialist Recommendations/Findings:   IP CONSULT TO CARDIOLOGY        Discharged Condition:    Stable     Disposition: Home    Physician Follow Up:   Sherie Antonio MD  [facility] Ron Szymanski  AlbertJennifer Mayes [address] [place] [address] Drive  [phone]    On 4/26/2021  at 11:15 am with  [facility] with [facility]        Activity:  activity as tolerated    Diet:  cardiac diet     Discharge Medications:      Medication List      START taking these medications    rivaroxaban 10 MG Tabs tablet  Commonly known as: XARELTO  Take 2 tablets by mouth daily  Start taking on: April 9, 2021        Karol Gallardo taking these medications    atorvastatin 20 MG tablet  Commonly known as: LIPITOR  TAKE ONE TABLET BY MOUTH ONCE DAILY     levothyroxine 112 MCG tablet  Commonly known as: SYNTHROID  TAKE ONE TABLET BY MOUTH ONCE DAILY     metoprolol succinate 50 MG extended release tablet  Commonly known as: TOPROL XL  One daily     valsartan-hydroCHLOROthiazide 160-12.5 MG per tablet  Commonly known as: DIOVAN-HCT  One daily     vitamin C 250 MG tablet           Where to Get Your Medications      These medications were sent to [facility] [address], [address] [facility] [address] - P [phone] - F [phone]  [address] [facility] [address] [address]    Phone: [phone]   · rivaroxaban 10 MG Tabs tablet         Time Spent on discharge is  20 mins in patient examination, evaluation, counseling, medication reconciliation, discharge plan and follow up. Electronically signed by   Joe Jerez DO  4/8/2021  6:21 PM      Thank you Dr. Jet Foster MD for the opportunity to be involved in this patient's care.

## 2021-04-08 NOTE — FLOWSHEET NOTE
Discharge instructions reviewed with pt and all questions answered. Separate packet provided and reviewed regarding Xarelto. IV removed without complication, as documented. Pt waiting on his ride from home. Finishing up dinner at this time. Pt in no distress. 1724: Pt leaving unit with his ride home. Independently ambulatory without any distress. No belongings left behind.

## 2021-04-08 NOTE — CARE COORDINATION
TRANSITIONAL CARE PLANNING/ 2 Rehab [facility] Day: 2    Reason for Admission: New onset a-fib [facility]) [I48.91]     Treatment Plan of Care: cardio -afib    Tests/Procedures still needed: none    Barriers to Discharge: heparin gtt needs transition to NOAC - ? Xarelto    Readmission Risk            Risk of Unplanned Readmission:        12      Patient goals/Treatment Preferences/Transitional Plan: home, indepeendnet    Referrals Made: none    Follow Up needed:

## 2021-04-12 LAB
CULTURE: NORMAL
CULTURE: NORMAL
Lab: NORMAL
Lab: NORMAL
SPECIMEN DESCRIPTION: NORMAL
SPECIMEN DESCRIPTION: NORMAL

## 2021-04-28 ENCOUNTER — TELEPHONE (OUTPATIENT)
Dept: INTERNAL MEDICINE CLINIC | Age: 66
End: 2021-04-28

## 2021-04-28 NOTE — TELEPHONE ENCOUNTER
[facility] calling to ask if it is ok to replace Xarelto 10 mg BID with Xarelto 20mg QD

## 2021-04-28 NOTE — TELEPHONE ENCOUNTER
Called pt, he has appt with you 21  He has a  tomorrow so will keep appt 21  He said Cardiology, Dr Gabrielle Manjarrez  did see him and they ordered the [facility].

## 2021-05-07 ENCOUNTER — OFFICE VISIT (OUTPATIENT)
Dept: INTERNAL MEDICINE CLINIC | Age: 66
End: 2021-05-07
Payer: COMMERCIAL

## 2021-05-07 VITALS
TEMPERATURE: 97.9 F | WEIGHT: 216.8 LBS | RESPIRATION RATE: 15 BRPM | HEIGHT: 75 IN | SYSTOLIC BLOOD PRESSURE: 102 MMHG | OXYGEN SATURATION: 98 % | BODY MASS INDEX: 26.96 KG/M2 | DIASTOLIC BLOOD PRESSURE: 60 MMHG | HEART RATE: 63 BPM

## 2021-05-07 DIAGNOSIS — I48.0 PAROXYSMAL ATRIAL FIBRILLATION (HCC): Primary | ICD-10-CM

## 2021-05-07 DIAGNOSIS — I10 ESSENTIAL HYPERTENSION: ICD-10-CM

## 2021-05-07 DIAGNOSIS — E03.2 HYPOTHYROIDISM DUE TO MEDICATION: ICD-10-CM

## 2021-05-07 PROBLEM — E86.0 DEHYDRATION: Status: RESOLVED | Noted: 2021-04-07 | Resolved: 2021-05-07

## 2021-05-07 PROCEDURE — 99214 OFFICE O/P EST MOD 30 MIN: CPT | Performed by: INTERNAL MEDICINE

## 2021-05-07 ASSESSMENT — PATIENT HEALTH QUESTIONNAIRE - PHQ9
SUM OF ALL RESPONSES TO PHQ9 QUESTIONS 1 & 2: 0
2. FEELING DOWN, DEPRESSED OR HOPELESS: 0
SUM OF ALL RESPONSES TO PHQ QUESTIONS 1-9: 0

## 2021-05-07 NOTE — PROGRESS NOTES
Mark Steve is a 72 y.o. male who presents for   Chief Complaint   Patient presents with    Follow-up     Cardiology; patient stated suppose to start blood thinner but he is not taking it    Health Maintenance     aaa, hep c, hiv, covid, shingles, colon, pneumo    and follow up of chronic medical problems. Patient Active Problem List   Diagnosis    Hyperlipidemia    Hypertension    Hypothyroidism    Epistaxis    Nevus    Basal cell carcinoma of nose    Hypogonadism in male    Localized edema    Atrial fibrillation with RVR (HCC)    New onset a-fib (HCC)    Alcohol use    Acute renal insufficiency    Dehydration    Azotemia    Elevated brain natriuretic peptide (BNP) level    Low HDL (under 40)    Left ventricular systolic dysfunction     HPI  Here for follow-up from the hospital admission for diarrhea and nausea vomiting and patient was found to have been in atrial fibrillation at that time since then his atrial fibrillation resolved and has seen the cardiologist and stress test was not done and patient stopped the anticoagulation since yesterday as it was causing fatigue and occasional blurred vision    Current Outpatient Medications   Medication Sig Dispense Refill    apixaban (ELIQUIS) 5 MG TABS tablet Take 1 tablet by mouth 2 times daily 60 tablet 0    Ascorbic Acid (VITAMIN C) 250 MG tablet Take 1,000 mg by mouth daily      levothyroxine (SYNTHROID) 112 MCG tablet TAKE ONE TABLET BY MOUTH ONCE DAILY 30 tablet 5    atorvastatin (LIPITOR) 20 MG tablet TAKE ONE TABLET BY MOUTH ONCE DAILY 30 tablet 5    valsartan-hydrochlorothiazide (DIOVAN-HCT) 160-12.5 MG per tablet One daily 30 tablet 5    metoprolol succinate (TOPROL XL) 50 MG extended release tablet One daily 30 tablet 5    rivaroxaban (XARELTO) 10 MG TABS tablet Take 2 tablets by mouth daily (Patient not taking: Reported on 5/7/2021) 30 tablet 3     No current facility-administered medications for this visit.         No Known Allergies    Past Medical History:   Diagnosis Date    Alcohol use     Hyperlipidemia     Hypertension     Thyroid disease        Past Surgical History:   Procedure Laterality Date    APPENDECTOMY      CARDIOVASCULAR STRESS TEST      10-15 yrs ago    HERNIA REPAIR         Family History   Problem Relation Age of Onset    Heart Disease Mother         OHS/ afib    COPD Father         lung disease    Alcohol Abuse Father      ROS   Constitutional:  Negative for fatigue, loss of appetite and unexpected weight change   HEENT            : Negative for neck stiffness and pain, no congestion or sinus pressure   Eyes                : No visual disturbance or pain   Cardiovascular: No chest pain or palpitations or leg swelling   Respiratory      : Negative for cough, shortness of breath or wheezing   Gastrointestinal: Negative for abdominal pain, constipation or diarrhea and bloating No nausea or vomiting   Genitourinary:     No urgency or frequency, no burning or hematuria   Musculoskeletal: No arthralgias, back pain or myalgias   Skin                  : Negative for rash or erythema   Neurological    : Negative for dizziness, weakness, tremors ,light headedness or syncope   Psychiatric       : Negative for dysphoric mood, sleep disturbances, nervous or anxious, or decreased concentration   All other review of systems was negative    Objective  Physical Examination:    Nursing note reviewed    /60 (Site: Right Upper Arm, Position: Sitting, Cuff Size: Medium Adult)   Pulse 63   Temp 97.9 °F (36.6 °C) (Temporal)   Resp 15   Ht 6' 3\" (1.905 m)   Wt 216 lb 12.8 oz (98.3 kg)   SpO2 98%   BMI 27.10 kg/m²   BP Readings from Last 3 Encounters:   05/07/21 102/60   04/08/21 103/65   04/06/21 108/71         Constitutional:  Kezia Solares is oriented to place, person and time ,appears well-developed and well-nourished  HEENT:  Atraumatic and normocephalic, external ears normal bilaterally, nose normal no oropharyngeal exudate and is clear and moist  Eyes:  EOCM normal; conjunctivae normal; PERRLA bilaterally  Neck:  Normal range of motion, neck supple, no JVD and no thyromegaly  Cardiovascular:  RRR, normal heart sounds and intact distal pulses  Pulmonary:  effort normal and breath sounds normal bilaterally,no wheezes or rales, no respiratory distress  Abdominal:  Soft, non-tender; normal bowel sounds, no masses  Musculoskeletal:  Normal range of motion and no edema or tenderness bilaterally  No lymphadenopathy  Neurological:  alert, oriented, and normal reflexes bilaterally  Skin: warm and dry  Psychiatric:  normal mood and effect; behavior normal.    Labs:   Lab Results   Component Value Date    LABA1C 5.6 12/07/2016     Lab Results   Component Value Date    CHOL 128 04/08/2021     Lab Results   Component Value Date    HDL 32 (L) 04/08/2021     Lab Results   Component Value Date    LDLCALC 105 06/05/2017     Lab Results   Component Value Date    TRIG 104 04/08/2021     No components found for: [place], [place]  Lab Results   Component Value Date    WBC 7.2 04/08/2021    HGB 14.0 04/08/2021    HCT 41.6 04/08/2021    MCV 89.6 04/08/2021     04/08/2021     Lab Results   Component Value Date    INR 1.0 04/07/2021    PROTIME 10.0 04/07/2021     Lab Results   Component Value Date    GLUCOSE 98 04/08/2021    CREATININE 0.99 04/08/2021    BUN 18 04/08/2021     04/08/2021    K 3.8 04/08/2021     04/08/2021    CO2 25 04/08/2021     Lab Results   Component Value Date    ALT 18 04/07/2021    AST 22 04/07/2021    ALKPHOS 49 04/07/2021    BILITOT 0.67 04/07/2021     Lab Results   Component Value Date    LABALBU 3.4 (L) 04/07/2021     Lab Results   Component Value Date    TSH 1.62 04/07/2021     Assessment:  1. Paroxysmal atrial fibrillation (HCC)    2. Essential hypertension    3.  Hypothyroidism due to medication        Plan:  Patient just finished his Xarelto yesterday and says that was causing him some blurred vision and fatigue and so will try Eliquis and patient was given a new prescription and patient's visit to the cardiologist reviewed and had not had the stress test done yet and says the insurance denied the process and I explained to him that I would contact the cardiologist office to discuss further options  Patient's TSH was stable at 1.62 during his last hospital admission  Blood pressure is stable on current medications  Echocardiogram reviewed from hospital admission showed 45 to 50% of ejection fraction and there were trivial mitral and tricuspid and pulmonic regurgitation  Advised patient to call me back if he does not hear from our office in the next week  Review in 6 weeks           1. Eulalia Curling received counseling on the following healthy behaviors: nutrition and exercise    2. Prior labs and health maintenance reviewed. 3.  Discussed use, benefit, and side effects of prescribed medications. Barriers to medication compliance addressed. All his questions were answered. Pt voiced understanding. Eulalia Curling will continue current medications, diet and exercise. Orders Placed This Encounter   Medications    apixaban (ELIQUIS) 5 MG TABS tablet     Sig: Take 1 tablet by mouth 2 times daily     Dispense:  60 tablet     Refill:  0          Completed Refills               Requested Prescriptions     Signed Prescriptions Disp Refills    apixaban (ELIQUIS) 5 MG TABS tablet 60 tablet 0     Sig: Take 1 tablet by mouth 2 times daily     4. Patient given educational materials - see patient instructions    5. Was a self-tracking handout given in paper form or via [employer]? NO    No orders of the defined types were placed in this encounter. Return in about 1 month (around 6/7/2021). Patient voiced understanding and agreed to treatment plan.      Electronically signed by Concha Harris MD on 5/7/2021 at 12:41 PM    This note is created with a voice recognition program and while intend to generate a document that accurately reflects the content of the visit, no guarantee can be provided that every mistake has been identified and corrected by editing.   Patient just finished his

## 2021-06-02 RX ORDER — APIXABAN 5 MG/1
TABLET, FILM COATED ORAL
Qty: 60 TABLET | Refills: 3 | Status: SHIPPED | OUTPATIENT
Start: 2021-06-02 | End: 2021-10-18

## 2021-06-03 ENCOUNTER — HOSPITAL ENCOUNTER (OUTPATIENT)
Age: 66
Setting detail: SPECIMEN
Discharge: HOME | End: 2021-06-03
Payer: COMMERCIAL

## 2021-06-03 DIAGNOSIS — E03.2 HYPOTHYROIDISM DUE TO MEDICATION: ICD-10-CM

## 2021-06-03 DIAGNOSIS — I10 ESSENTIAL HYPERTENSION: ICD-10-CM

## 2021-06-03 DIAGNOSIS — R42 LIGHTHEADEDNESS: ICD-10-CM

## 2021-06-03 DIAGNOSIS — E78.00 HIGH CHOLESTEROL: ICD-10-CM

## 2021-06-03 LAB
ALBUMIN SERPL-MCNC: 4.2 G/DL (ref 3.5–5.2)
ALBUMIN/GLOBULIN RATIO: 1.8 (ref 1–2.5)
ALP BLD-CCNC: 64 U/L (ref 40–129)
ALT SERPL-CCNC: 16 U/L (ref 5–41)
ANION GAP SERPL CALCULATED.3IONS-SCNC: 10 MMOL/L (ref 9–17)
AST SERPL-CCNC: 16 U/L
BILIRUB SERPL-MCNC: 0.66 MG/DL (ref 0.3–1.2)
BUN BLDV-MCNC: 14 MG/DL (ref 8–23)
BUN/CREAT BLD: ABNORMAL (ref 9–20)
CALCIUM SERPL-MCNC: 9.2 MG/DL (ref 8.6–10.4)
CHLORIDE BLD-SCNC: 107 MMOL/L (ref 98–107)
CHOLESTEROL/HDL RATIO: 3.6
CHOLESTEROL: 167 MG/DL
CO2: 28 MMOL/L (ref 20–31)
CREAT SERPL-MCNC: 0.9 MG/DL (ref 0.7–1.2)
GFR AFRICAN AMERICAN: >60 ML/MIN
GFR NON-AFRICAN AMERICAN: >60 ML/MIN
GFR SERPL CREATININE-BSD FRML MDRD: ABNORMAL ML/MIN/{1.73_M2}
GFR SERPL CREATININE-BSD FRML MDRD: ABNORMAL ML/MIN/{1.73_M2}
GLUCOSE BLD-MCNC: 78 MG/DL (ref 70–99)
HCT VFR BLD CALC: 43 % (ref 40.7–50.3)
HDLC SERPL-MCNC: 46 MG/DL
HEMOGLOBIN: 13.9 G/DL (ref 13–17)
LDL CHOLESTEROL: 107 MG/DL (ref 0–130)
MCH RBC QN AUTO: 30.2 PG (ref 25.2–33.5)
MCHC RBC AUTO-ENTMCNC: 32.3 G/DL (ref 28.4–34.8)
MCV RBC AUTO: 93.3 FL (ref 82.6–102.9)
NRBC AUTOMATED: 0 PER 100 WBC
PDW BLD-RTO: 13.4 % (ref 11.8–14.4)
PLATELET # BLD: 209 K/UL (ref 138–453)
PMV BLD AUTO: 11.7 FL (ref 8.1–13.5)
POTASSIUM SERPL-SCNC: 4.2 MMOL/L (ref 3.7–5.3)
RBC # BLD: 4.61 M/UL (ref 4.21–5.77)
SODIUM BLD-SCNC: 145 MMOL/L (ref 135–144)
THYROXINE, FREE: 1.14 NG/DL (ref 0.93–1.7)
TOTAL PROTEIN: 6.5 G/DL (ref 6.4–8.3)
TRIGL SERPL-MCNC: 68 MG/DL
TSH SERPL DL<=0.05 MIU/L-ACNC: 2.71 MIU/L (ref 0.3–5)
VITAMIN B-12: 720 PG/ML (ref 232–1245)
VLDLC SERPL CALC-MCNC: NORMAL MG/DL (ref 1–30)
WBC # BLD: 4.7 K/UL (ref 3.5–11.3)

## 2021-06-09 ENCOUNTER — OFFICE VISIT (OUTPATIENT)
Dept: INTERNAL MEDICINE CLINIC | Age: 66
End: 2021-06-09
Payer: COMMERCIAL

## 2021-06-09 VITALS
OXYGEN SATURATION: 99 % | BODY MASS INDEX: 27.43 KG/M2 | TEMPERATURE: 97.6 F | DIASTOLIC BLOOD PRESSURE: 72 MMHG | WEIGHT: 220.6 LBS | HEIGHT: 75 IN | SYSTOLIC BLOOD PRESSURE: 130 MMHG | HEART RATE: 64 BPM

## 2021-06-09 DIAGNOSIS — I10 ESSENTIAL HYPERTENSION: ICD-10-CM

## 2021-06-09 DIAGNOSIS — I48.0 PAROXYSMAL ATRIAL FIBRILLATION (HCC): Primary | ICD-10-CM

## 2021-06-09 DIAGNOSIS — R94.30 LOW LEFT VENTRICULAR EJECTION FRACTION: ICD-10-CM

## 2021-06-09 PROCEDURE — 99214 OFFICE O/P EST MOD 30 MIN: CPT | Performed by: INTERNAL MEDICINE

## 2021-06-09 SDOH — ECONOMIC STABILITY: FOOD INSECURITY: WITHIN THE PAST 12 MONTHS, THE FOOD YOU BOUGHT JUST DIDN'T LAST AND YOU DIDN'T HAVE MONEY TO GET MORE.: NEVER TRUE

## 2021-06-09 SDOH — ECONOMIC STABILITY: FOOD INSECURITY: WITHIN THE PAST 12 MONTHS, YOU WORRIED THAT YOUR FOOD WOULD RUN OUT BEFORE YOU GOT MONEY TO BUY MORE.: NEVER TRUE

## 2021-06-09 ASSESSMENT — PATIENT HEALTH QUESTIONNAIRE - PHQ9
SUM OF ALL RESPONSES TO PHQ QUESTIONS 1-9: 0
1. LITTLE INTEREST OR PLEASURE IN DOING THINGS: 0
SUM OF ALL RESPONSES TO PHQ QUESTIONS 1-9: 0
SUM OF ALL RESPONSES TO PHQ QUESTIONS 1-9: 0
SUM OF ALL RESPONSES TO PHQ9 QUESTIONS 1 & 2: 0
2. FEELING DOWN, DEPRESSED OR HOPELESS: 0

## 2021-06-09 ASSESSMENT — SOCIAL DETERMINANTS OF HEALTH (SDOH): HOW HARD IS IT FOR YOU TO PAY FOR THE VERY BASICS LIKE FOOD, HOUSING, MEDICAL CARE, AND HEATING?: NOT HARD AT ALL

## 2021-06-09 NOTE — PROGRESS NOTES
Stress test set at [facility] for 6/21/21 at 730pm    Cardiology appt set at [facility] already, pt will confiorm appt

## 2021-06-09 NOTE — PROGRESS NOTES
Patti Hanley is a 72 y.o. male who presents for   Chief Complaint   Patient presents with    1 Month Follow-Up     has appt with cardiology at the end of June    Health Maintenance     aaa, hep c, covid, hiv, shingles, colon, pneumo    and follow up of chronic medical problems. Patient Active Problem List   Diagnosis    Hyperlipidemia    Hypertension    Hypothyroidism    Epistaxis    Nevus    Basal cell carcinoma of nose    Hypogonadism in male    Localized edema    Atrial fibrillation with RVR (HCC)    New onset a-fib (HCC)    Alcohol use    Acute renal insufficiency    Azotemia    Elevated brain natriuretic peptide (BNP) level    Low HDL (under 40)    Left ventricular systolic dysfunction     HPI  Here for follow-up from the last visit for atrial fibrillation and still waiting for the cardiology to order the stress test    Current Outpatient Medications   Medication Sig Dispense Refill    ELIQUIS 5 MG TABS tablet TAKE ONE TABLET BY MOUTH TWICE A DAY (Patient taking differently: 5 mg Taking 1 tab daily) 60 tablet 3    Ascorbic Acid (VITAMIN C) 250 MG tablet Take 1,000 mg by mouth daily      levothyroxine (SYNTHROID) 112 MCG tablet TAKE ONE TABLET BY MOUTH ONCE DAILY 30 tablet 5    atorvastatin (LIPITOR) 20 MG tablet TAKE ONE TABLET BY MOUTH ONCE DAILY 30 tablet 5    valsartan-hydrochlorothiazide (DIOVAN-HCT) 160-12.5 MG per tablet One daily 30 tablet 5    metoprolol succinate (TOPROL XL) 50 MG extended release tablet One daily 30 tablet 5     No current facility-administered medications for this visit.        No Known Allergies    Past Medical History:   Diagnosis Date    Alcohol use     Hyperlipidemia     Hypertension     Thyroid disease        Past Surgical History:   Procedure Laterality Date    APPENDECTOMY      CARDIOVASCULAR STRESS TEST      10-15 yrs ago    HERNIA REPAIR         Family History   Problem Relation Age of Onset    Heart Disease Mother         OHS/ afib    COPD Father         lung disease    Alcohol Abuse Father      ROS   Constitutional:  Negative for fatigue, loss of appetite and unexpected weight change   HEENT            : Negative for neck stiffness and pain, no congestion or sinus pressure   Eyes                : No visual disturbance or pain   Cardiovascular: No chest pain or palpitations or leg swelling   Respiratory      : Negative for cough, shortness of breath or wheezing   Gastrointestinal: Negative for abdominal pain, constipation or diarrhea and bloating No nausea or vomiting   Genitourinary:     No urgency or frequency, no burning or hematuria   Musculoskeletal: No arthralgias, back pain or myalgias   Skin                  : Negative for rash or erythema   Neurological    : Negative for dizziness, weakness, tremors ,light headedness or syncope   Psychiatric       : Negative for dysphoric mood, sleep disturbances, nervous or anxious, or decreased concentration   All other review of systems was negative    Objective  Physical Examination:    Nursing note reviewed    /72 (Site: Right Upper Arm, Position: Sitting, Cuff Size: Medium Adult)   Pulse 64   Temp 97.6 °F (36.4 °C) (Temporal)   Ht 6' 2.98\" (1.904 m)   Wt 220 lb 9.6 oz (100.1 kg)   SpO2 99%   BMI 27.59 kg/m²   BP Readings from Last 3 Encounters:   06/09/21 130/72   05/07/21 102/60   04/08/21 103/65         Constitutional:  Clari So is oriented to place, person and time ,appears well-developed and well-nourished  HEENT:  Atraumatic and normocephalic, external ears normal bilaterally, nose normal no oropharyngeal exudate and is clear and moist  Eyes:  EOCM normal; conjunctivae normal; PERRLA bilaterally  Neck:  Normal range of motion, neck supple, no JVD and no thyromegaly  Cardiovascular:  RRR, normal heart sounds and intact distal pulses  Pulmonary:  effort normal and breath sounds normal bilaterally,no wheezes or rales, no respiratory distress  Abdominal:  Soft, non-tender; normal bowel sounds, no masses  Musculoskeletal:  Normal range of motion and no edema or tenderness bilaterally  No lymphadenopathy  Neurological:  alert, oriented, and normal reflexes bilaterally  Skin: warm and dry  Psychiatric:  normal mood and effect; behavior normal.    Labs:   Lab Results   Component Value Date    LABA1C 5.6 12/07/2016     Lab Results   Component Value Date    CHOL 167 06/03/2021     Lab Results   Component Value Date    HDL 46 06/03/2021     Lab Results   Component Value Date    LDLCALC 105 06/05/2017     Lab Results   Component Value Date    TRIG 68 06/03/2021     No components found for: [place], [place]  Lab Results   Component Value Date    WBC 4.7 06/03/2021    HGB 13.9 06/03/2021    HCT 43.0 06/03/2021    MCV 93.3 06/03/2021     06/03/2021     Lab Results   Component Value Date    INR 1.0 04/07/2021    PROTIME 10.0 04/07/2021     Lab Results   Component Value Date    GLUCOSE 78 06/03/2021    CREATININE 0.90 06/03/2021    BUN 14 06/03/2021     (H) 06/03/2021    K 4.2 06/03/2021     06/03/2021    CO2 28 06/03/2021     Lab Results   Component Value Date    ALT 16 06/03/2021    AST 16 06/03/2021    ALKPHOS 64 06/03/2021    BILITOT 0.66 06/03/2021     Lab Results   Component Value Date    LABALBU 4.2 06/03/2021     Lab Results   Component Value Date    TSH 2.71 06/03/2021     Assessment:  1. Paroxysmal atrial fibrillation (HCC)    2. Low left ventricular ejection fraction    3.  Essential hypertension        Plan:  Patient had no stress test done as it was denied by the insurance because it was not medically necessary but in my opinion patient had an ejection fraction which is low at 45 to 50% and had paroxysmal atrial fibrillation and needs evaluation for ischemic heart disease and so patient is advised to get the stress test done and a new order was sent  Patient has no appointment to see the cardiologist and then next month and is already 3 months since the first episode happened  As requested by the patient will refer to a different cardiology group but will order the stress test before that  Blood pressure is stable on current medications  Advised patient increase the Eliquis to twice a day  Review after the cardiology visit           1. Linda Morillo received counseling on the following healthy behaviors: nutrition and exercise    2. Prior labs and health maintenance reviewed. 3.  Discussed use, benefit, and side effects of prescribed medications. Barriers to medication compliance addressed. All his questions were answered. Pt voiced understanding. Linda Morillo will continue current medications, diet and exercise. No orders of the defined types were placed in this encounter. Completed Refills               Requested Prescriptions      No prescriptions requested or ordered in this encounter     4. Patient given educational materials - see patient instructions    5. Was a self-tracking handout given in paper form or via [employer]? NO    Orders Placed This Encounter   Procedures    NM MYOCARDIAL SPECT REST EXERCISE OR RX     Standing Status:   Future     Standing Expiration Date:   6/9/2022     Order Specific Question:   Reason for Exam?     Answer:   EKG abnormalities     Order Specific Question:   Procedure Type     Answer:   Exercise     Order Specific Question:   Reason for exam:     Answer:   Low ejection fraction new onset atrial fibrillation     No follow-ups on file. Patient voiced understanding and agreed to treatment plan. Electronically signed by Carmen Milner MD on 6/9/2021 at 2:04 PM    This note is created with a voice recognition program and while intend to generate a document that accurately reflects the content of the visit, no guarantee can be provided that every mistake has been identified and corrected by editing.

## 2021-06-10 ENCOUNTER — TELEPHONE (OUTPATIENT)
Dept: INTERNAL MEDICINE CLINIC | Age: 66
End: 2021-06-10

## 2021-06-21 ENCOUNTER — HOSPITAL ENCOUNTER (OUTPATIENT)
Dept: NUCLEAR MEDICINE | Age: 66
Discharge: HOME | End: 2021-06-23
Payer: COMMERCIAL

## 2021-06-21 ENCOUNTER — HOSPITAL ENCOUNTER (OUTPATIENT)
Dept: NON INVASIVE DIAGNOSTICS | Age: 66
Discharge: HOME | End: 2021-06-21
Payer: COMMERCIAL

## 2021-06-21 VITALS — DIASTOLIC BLOOD PRESSURE: 88 MMHG | SYSTOLIC BLOOD PRESSURE: 146 MMHG | RESPIRATION RATE: 20 BRPM | HEART RATE: 80 BPM

## 2021-06-21 DIAGNOSIS — R94.30 LOW LEFT VENTRICULAR EJECTION FRACTION: ICD-10-CM

## 2021-06-21 DIAGNOSIS — I48.0 PAROXYSMAL ATRIAL FIBRILLATION (HCC): ICD-10-CM

## 2021-06-21 LAB
LV EF: 49 %
LVEF MODALITY: NORMAL

## 2021-06-21 PROCEDURE — 93017 CV STRESS TEST TRACING ONLY: CPT

## 2021-06-21 PROCEDURE — A9500 TC99M SESTAMIBI: HCPCS | Performed by: INTERNAL MEDICINE

## 2021-06-21 PROCEDURE — 78452 HT MUSCLE IMAGE SPECT MULT: CPT

## 2021-06-21 PROCEDURE — 3430000000 HC RX DIAGNOSTIC RADIOPHARMACEUTICAL: Performed by: INTERNAL MEDICINE

## 2021-06-21 RX ORDER — SODIUM CHLORIDE 9 MG/ML
500 INJECTION, SOLUTION INTRAVENOUS CONTINUOUS PRN
Status: DISCONTINUED | OUTPATIENT
Start: 2021-06-21 | End: 2021-06-21 | Stop reason: HOSPADM

## 2021-06-21 RX ORDER — ATROPINE SULFATE 0.1 MG/ML
0.5 INJECTION INTRAVENOUS EVERY 5 MIN PRN
Status: DISCONTINUED | OUTPATIENT
Start: 2021-06-21 | End: 2021-06-21 | Stop reason: HOSPADM

## 2021-06-21 RX ORDER — SODIUM CHLORIDE 0.9 % (FLUSH) 0.9 %
5-40 SYRINGE (ML) INJECTION PRN
Status: DISCONTINUED | OUTPATIENT
Start: 2021-06-21 | End: 2021-06-21 | Stop reason: HOSPADM

## 2021-06-21 RX ORDER — ALBUTEROL SULFATE 90 UG/1
2 AEROSOL, METERED RESPIRATORY (INHALATION) PRN
Status: DISCONTINUED | OUTPATIENT
Start: 2021-06-21 | End: 2021-06-21 | Stop reason: HOSPADM

## 2021-06-21 RX ORDER — NITROGLYCERIN 0.4 MG/1
0.4 TABLET SUBLINGUAL EVERY 5 MIN PRN
Status: DISCONTINUED | OUTPATIENT
Start: 2021-06-21 | End: 2021-06-21 | Stop reason: HOSPADM

## 2021-06-21 RX ORDER — METOPROLOL TARTRATE 5 MG/5ML
5 INJECTION INTRAVENOUS EVERY 5 MIN PRN
Status: DISCONTINUED | OUTPATIENT
Start: 2021-06-21 | End: 2021-06-21 | Stop reason: HOSPADM

## 2021-06-21 RX ADMIN — TETRAKIS(2-METHOXYISOBUTYLISOCYANIDE)COPPER(I) TETRAFLUOROBORATE 35.8 MILLICURIE: 1 INJECTION, POWDER, LYOPHILIZED, FOR SOLUTION INTRAVENOUS at 12:40

## 2021-06-21 RX ADMIN — TETRAKIS(2-METHOXYISOBUTYLISOCYANIDE)COPPER(I) TETRAFLUOROBORATE 18.7 MILLICURIE: 1 INJECTION, POWDER, LYOPHILIZED, FOR SOLUTION INTRAVENOUS at 08:52

## 2021-06-21 NOTE — PROCEDURES
[address] [facility] [address]                 [address] [facility], [address] [place] [address]                              CARDIAC STRESS TEST    PATIENT NAME: Venancio Carreon                      :        1955  MED REC NO:   [id number]                             ROOM:  ACCOUNT NO:   [de-identified]                           ADMIT DATE: 2021  PROVIDER:     Gordon Leonard    DATE OF STUDY:  2021    TREADMILL MYOVIEW STRESS TEST    ATTENDING PROVIDER:  Nazario Murray MD    PRIMARY CARE PROVIDER:  Nazario Murray MD    PERFORMING PHYSICIAN: Audrey Fitzpatrick. Imelda Mobley MD    INDICATION:  Atrial fibrillation, low ejection fraction. HEART RATE  100% max predicted heart rate:  155  85% max predicted heart rate:  131  Duration:  9:00    Resting heart rate:  61  Maximum heart rate achieved:  134  % of predicted maximum:  86    BLOOD PRESSURE  Resting BP:  123/80  Peak BP:  172/84  Peak double product:  71239  METS:  10.4    REASON FOR TERMINATION:  85% maximum predicted heart rate achieved. BASELINE EKG DEMONSTRATED:  Sinus rhythm. During the stress test, the patient reported: No symptoms. STRESS EKG DEMONSTRATED:  No abnormal change. Rare premature ventricular contractions. HEART RATE RESPONSE:   Normal response. BLOOD PRESSURE RESPONSE:   Normal response. Functional capacity is: Average. Formula score: 9:03 minutes - 5*(0) - 4* (0) = 9:03    Duke score for this test is: Low risk (> to 5). EKG IMPRESSION:  Negative. FINAL IMPRESSION:  Negative. Nuclear medicine report to follow.           Gino Ansari    D: 2021 9:15:10       T: 2021 9:17:56     [email or web address]  Job#: [id number]     Doc#: Unknown

## 2021-07-08 ENCOUNTER — OFFICE VISIT (OUTPATIENT)
Dept: INTERNAL MEDICINE CLINIC | Age: 66
End: 2021-07-08
Payer: COMMERCIAL

## 2021-07-08 ENCOUNTER — TELEPHONE (OUTPATIENT)
Dept: INTERNAL MEDICINE CLINIC | Age: 66
End: 2021-07-08

## 2021-07-08 VITALS
HEIGHT: 75 IN | TEMPERATURE: 97.2 F | DIASTOLIC BLOOD PRESSURE: 80 MMHG | WEIGHT: 216.4 LBS | OXYGEN SATURATION: 99 % | SYSTOLIC BLOOD PRESSURE: 122 MMHG | BODY MASS INDEX: 26.91 KG/M2 | HEART RATE: 56 BPM

## 2021-07-08 DIAGNOSIS — R94.30 LOW LEFT VENTRICULAR EJECTION FRACTION: ICD-10-CM

## 2021-07-08 DIAGNOSIS — I48.0 PAROXYSMAL ATRIAL FIBRILLATION (HCC): Primary | ICD-10-CM

## 2021-07-08 DIAGNOSIS — I25.9 ISCHEMIC HEART DISEASE: ICD-10-CM

## 2021-07-08 DIAGNOSIS — I10 ESSENTIAL HYPERTENSION: ICD-10-CM

## 2021-07-08 PROCEDURE — 99214 OFFICE O/P EST MOD 30 MIN: CPT | Performed by: INTERNAL MEDICINE

## 2021-07-08 ASSESSMENT — PATIENT HEALTH QUESTIONNAIRE - PHQ9
2. FEELING DOWN, DEPRESSED OR HOPELESS: 0
SUM OF ALL RESPONSES TO PHQ9 QUESTIONS 1 & 2: 0
SUM OF ALL RESPONSES TO PHQ QUESTIONS 1-9: 0
1. LITTLE INTEREST OR PLEASURE IN DOING THINGS: 0
SUM OF ALL RESPONSES TO PHQ QUESTIONS 1-9: 0
SUM OF ALL RESPONSES TO PHQ QUESTIONS 1-9: 0

## 2021-07-08 NOTE — TELEPHONE ENCOUNTER
FYI   Patient scheduled for 7/19/21 at 11:15   W/ Dr Lara Loyola at [address] [place] [address] on [address]

## 2021-07-08 NOTE — PROGRESS NOTES
Keven Forbes is a 72 y.o. male who presents for   Chief Complaint   Patient presents with    6 Month Follow-Up     discuss stress test; unable to see cardiology    Health Maintenance     aaa, hep c, covid, hiv, shingles, colon, pneumo    and follow up of chronic medical problems. Patient Active Problem List   Diagnosis    Hyperlipidemia    Hypertension    Hypothyroidism    Epistaxis    Nevus    Basal cell carcinoma of nose    Hypogonadism in male    Localized edema    Atrial fibrillation with RVR (HCC)    New onset a-fib (HCC)    Alcohol use    Acute renal insufficiency    Azotemia    Elevated brain natriuretic peptide (BNP) level    Low HDL (under 40)    Left ventricular systolic dysfunction     HPI  Here for follow-up on the stress test denies any new complaints other than feeling tired    Current Outpatient Medications   Medication Sig Dispense Refill    ELIQUIS 5 MG TABS tablet TAKE ONE TABLET BY MOUTH TWICE A DAY (Patient taking differently: 5 mg Taking 1 tab daily) 60 tablet 3    Ascorbic Acid (VITAMIN C) 250 MG tablet Take 1,000 mg by mouth daily      levothyroxine (SYNTHROID) 112 MCG tablet TAKE ONE TABLET BY MOUTH ONCE DAILY 30 tablet 5    atorvastatin (LIPITOR) 20 MG tablet TAKE ONE TABLET BY MOUTH ONCE DAILY 30 tablet 5    valsartan-hydrochlorothiazide (DIOVAN-HCT) 160-12.5 MG per tablet One daily 30 tablet 5    metoprolol succinate (TOPROL XL) 50 MG extended release tablet One daily 30 tablet 5     No current facility-administered medications for this visit.        No Known Allergies    Past Medical History:   Diagnosis Date    Alcohol use     Hyperlipidemia     Hypertension     Thyroid disease        Past Surgical History:   Procedure Laterality Date    APPENDECTOMY      CARDIOVASCULAR STRESS TEST      10-15 yrs ago    HERNIA REPAIR         Family History   Problem Relation Age of Onset    Heart Disease Mother         OHS/ afib    COPD Father         lung disease    masses  Musculoskeletal:  Normal range of motion and no edema or tenderness bilaterally  No lymphadenopathy  Neurological:  alert, oriented, and normal reflexes bilaterally  Skin: warm and dry  Psychiatric:  normal mood and effect; behavior normal.    Labs:   Lab Results   Component Value Date    LABA1C 5.6 12/07/2016     Lab Results   Component Value Date    CHOL 167 06/03/2021     Lab Results   Component Value Date    HDL 46 06/03/2021     Lab Results   Component Value Date    LDLCALC 105 06/05/2017     Lab Results   Component Value Date    TRIG 68 06/03/2021     No components found for: [place], [place]  Lab Results   Component Value Date    WBC 4.7 06/03/2021    HGB 13.9 06/03/2021    HCT 43.0 06/03/2021    MCV 93.3 06/03/2021     06/03/2021     Lab Results   Component Value Date    INR 1.0 04/07/2021    PROTIME 10.0 04/07/2021     Lab Results   Component Value Date    GLUCOSE 78 06/03/2021    CREATININE 0.90 06/03/2021    BUN 14 06/03/2021     (H) 06/03/2021    K 4.2 06/03/2021     06/03/2021    CO2 28 06/03/2021     Lab Results   Component Value Date    ALT 16 06/03/2021    AST 16 06/03/2021    ALKPHOS 64 06/03/2021    BILITOT 0.66 06/03/2021     Lab Results   Component Value Date    LABALBU 4.2 06/03/2021     Lab Results   Component Value Date    TSH 2.71 06/03/2021     Assessment:  1. Paroxysmal atrial fibrillation (HCC)    2. Low left ventricular ejection fraction    3. Essential hypertension    4.  Ischemic heart disease        Plan:  Patient's stress test reviewed which showed ischemic heart disease and inferior infarct and the ejection fraction of 49% and patient is advised to follow-up with cardiology and there is a new appointment made with the [facility] he has seen in the past and currently patient is asymptomatic  Advised patient increase the Eliquis to 5 mg twice a day and patient states that he had some gum bleeds and nosebleeds and so he cut it down to once a day and advised him to try again taking twice a day and see if the bleeding recurs he can cut it down to half tablet twice a day  Blood pressure is stable on current medications and patient currently in sinus rhythm  Review in 4 months             1.  Yancey Severs received counseling on the following healthy behaviors: nutrition and exercise    2. Prior labs and health maintenance reviewed. 3.  Discussed use, benefit, and side effects of prescribed medications. Barriers to medication compliance addressed. All his questions were answered. Pt voiced understanding. Yancey Severs will continue current medications, diet and exercise. No orders of the defined types were placed in this encounter. Completed Refills               Requested Prescriptions      No prescriptions requested or ordered in this encounter     4. Patient given educational materials - see patient instructions    5. Was a self-tracking handout given in paper form or via [employer]? NO    No orders of the defined types were placed in this encounter. No follow-ups on file. Patient voiced understanding and agreed to treatment plan. Electronically signed by Yola Van MD on 7/8/2021 at 12:24 PM    This note is created with a voice recognition program and while intend to generate a document that accurately reflects the content of the visit, no guarantee can be provided that every mistake has been identified and corrected by editing.

## 2021-07-19 RX ORDER — LEVOTHYROXINE SODIUM 112 UG/1
TABLET ORAL
Qty: 90 TABLET | Refills: 0 | Status: SHIPPED | OUTPATIENT
Start: 2021-07-19 | End: 2021-10-18

## 2021-07-19 RX ORDER — VALSARTAN AND HYDROCHLOROTHIAZIDE 160; 12.5 MG/1; MG/1
TABLET, FILM COATED ORAL
Qty: 90 TABLET | Refills: 0 | Status: SHIPPED | OUTPATIENT
Start: 2021-07-19 | End: 2021-10-18

## 2021-07-19 RX ORDER — METOPROLOL SUCCINATE 50 MG/1
TABLET, EXTENDED RELEASE ORAL
Qty: 90 TABLET | Refills: 0 | Status: SHIPPED | OUTPATIENT
Start: 2021-07-19 | End: 2021-08-30

## 2021-08-28 NOTE — TELEPHONE ENCOUNTER
Alia Banks is calling to request a refill on the following medication(s):    Medication Request:  Requested Prescriptions     Pending Prescriptions Disp Refills    metoprolol succinate (TOPROL XL) 50 MG extended release tablet [Pharmacy Med Name: METOPROLOL SUCCINATE ER 50MG SUSTAINED RELEASE TABLET] 90 tablet 0     Sig: TAKE ONE TABLET BY MOUTH ONCE DAILY     Last fill 7/19/21  Last Visit Date (If Applicable):  3/0/2160    Next Visit Date:    11/10/2021

## 2021-08-30 RX ORDER — METOPROLOL SUCCINATE 50 MG/1
TABLET, EXTENDED RELEASE ORAL
Qty: 90 TABLET | Refills: 0 | Status: SHIPPED | OUTPATIENT
Start: 2021-08-30 | End: 2022-01-14

## 2021-09-28 RX ORDER — ATORVASTATIN CALCIUM 20 MG/1
TABLET, FILM COATED ORAL
Qty: 30 TABLET | Refills: 3 | Status: SHIPPED | OUTPATIENT
Start: 2021-09-28 | End: 2022-01-14

## 2021-10-18 RX ORDER — VALSARTAN AND HYDROCHLOROTHIAZIDE 160; 12.5 MG/1; MG/1
TABLET, FILM COATED ORAL
Qty: 30 TABLET | Refills: 1 | Status: SHIPPED | OUTPATIENT
Start: 2021-10-18 | End: 2021-12-27

## 2021-10-18 RX ORDER — LEVOTHYROXINE SODIUM 112 UG/1
TABLET ORAL
Qty: 30 TABLET | Refills: 1 | Status: SHIPPED | OUTPATIENT
Start: 2021-10-18 | End: 2021-12-27

## 2021-10-18 RX ORDER — APIXABAN 5 MG/1
TABLET, FILM COATED ORAL
Qty: 60 TABLET | Refills: 1 | Status: SHIPPED | OUTPATIENT
Start: 2021-10-18 | End: 2021-12-27

## 2021-10-18 NOTE — TELEPHONE ENCOUNTER
Bill Kwan is calling to request a refill on the following medication(s):    Medication Request:    Last filled synthroid and diovan 7/19/21 #90 with 0 RF  eliquis last filled 6/2/21 #60 with 3 RF    Requested Prescriptions     Pending Prescriptions Disp Refills    valsartan-hydroCHLOROthiazide (DIOVAN-HCT) 160-12.5 MG per tablet [Pharmacy Med Name: Valsartan-hydroCHLOROthiazide 160-12.5 MG Oral Tablet (Diovan HCT)] 30 tablet      Sig: TAKE ONE TABLET BY MOUTH ONCE DAILY    levothyroxine (SYNTHROID) 112 MCG tablet [Pharmacy Med Name: Levothyroxine Sodium 112 MCG Oral Tablet (Euthyrox)] 30 tablet      Sig: TAKE ONE TABLET BY MOUTH ONCE DAILY    ELIQUIS 5 MG TABS tablet [Pharmacy Med Name: Eliquis 5 MG Oral Tablet (Apixaban)] 60 tablet 3     Sig: TAKE ONE TABLET BY MOUTH TWICE A DAY       Last Visit Date (If Applicable):  8/4/6679    Next Visit Date:    11/10/2021

## 2021-12-27 RX ORDER — LEVOTHYROXINE SODIUM 112 UG/1
TABLET ORAL
Qty: 30 TABLET | Refills: 0 | Status: SHIPPED | OUTPATIENT
Start: 2021-12-27 | End: 2022-01-14

## 2021-12-27 RX ORDER — APIXABAN 5 MG/1
TABLET, FILM COATED ORAL
Qty: 60 TABLET | Refills: 0 | Status: SHIPPED | OUTPATIENT
Start: 2021-12-27 | End: 2022-01-14

## 2021-12-27 RX ORDER — VALSARTAN AND HYDROCHLOROTHIAZIDE 160; 12.5 MG/1; MG/1
TABLET, FILM COATED ORAL
Qty: 30 TABLET | Refills: 0 | Status: SHIPPED | OUTPATIENT
Start: 2021-12-27 | End: 2022-01-14

## 2021-12-27 NOTE — TELEPHONE ENCOUNTER
Kirt Pelaez is calling to request a refill on the following medication(s):    Medication Request:    Last filled 10/18/21 #30 with 1 RF    Requested Prescriptions     Pending Prescriptions Disp Refills    valsartan-hydroCHLOROthiazide (DIOVAN-HCT) 160-12.5 MG per tablet [Pharmacy Med Name: Valsartan-hydroCHLOROthiazide 160-12.5 MG Oral Tablet (Diovan HCT)] 30 tablet 1     Sig: TAKE ONE TABLET BY MOUTH ONCE DAILY    levothyroxine (SYNTHROID) 112 MCG tablet [Pharmacy Med Name: Levothyroxine Sodium 112 MCG Oral Tablet (Euthyrox)] 30 tablet 1     Sig: TAKE ONE TABLET BY MOUTH ONCE DAILY    ELIQUIS 5 MG TABS tablet [Pharmacy Med Name: Eliquis 5 MG Oral Tablet (Apixaban)] 60 tablet 1     Sig: TAKE ONE TABLET BY MOUTH TWICE A DAY       Last Visit Date (If Applicable):  5/2/4188    Next Visit Date:    Visit date not found

## 2022-01-14 RX ORDER — ATORVASTATIN CALCIUM 20 MG/1
TABLET, FILM COATED ORAL
Qty: 30 TABLET | Refills: 0 | Status: SHIPPED | OUTPATIENT
Start: 2022-01-14 | End: 2022-02-28

## 2022-01-14 RX ORDER — VALSARTAN AND HYDROCHLOROTHIAZIDE 160; 12.5 MG/1; MG/1
TABLET, FILM COATED ORAL
Qty: 30 TABLET | Refills: 0 | Status: SHIPPED | OUTPATIENT
Start: 2022-01-14 | End: 2022-03-25

## 2022-01-14 RX ORDER — LEVOTHYROXINE SODIUM 112 UG/1
TABLET ORAL
Qty: 30 TABLET | Refills: 0 | Status: SHIPPED | OUTPATIENT
Start: 2022-01-14 | End: 2022-02-24

## 2022-01-14 RX ORDER — METOPROLOL SUCCINATE 50 MG/1
TABLET, EXTENDED RELEASE ORAL
Qty: 90 TABLET | Refills: 0 | Status: SHIPPED | OUTPATIENT
Start: 2022-01-14 | End: 2022-02-28

## 2022-01-14 RX ORDER — APIXABAN 5 MG/1
TABLET, FILM COATED ORAL
Qty: 60 TABLET | Refills: 0 | Status: SHIPPED | OUTPATIENT
Start: 2022-01-14 | End: 2022-02-28

## 2022-01-14 NOTE — TELEPHONE ENCOUNTER
Franny Noble is calling to request a refill on the following medication(s):    Medication Request:  Requested Prescriptions     Pending Prescriptions Disp Refills    metoprolol succinate (TOPROL XL) 50 MG extended release tablet [Pharmacy Med Name: Metoprolol Succinate ER 50 MG Oral Tablet Extended Release 24 Hour (Toprol XL)] 90 tablet 0     Sig: TAKE ONE TABLET BY MOUTH ONCE DAILY    ELIQUIS 5 MG TABS tablet [Pharmacy Med Name: Eliquis 5 MG Oral Tablet (Apixaban)] 60 tablet 0     Sig: TAKE ONE TABLET BY MOUTH TWICE A DAY    levothyroxine (SYNTHROID) 112 MCG tablet [Pharmacy Med Name: Levothyroxine Sodium 112 MCG Oral Tablet (Euthyrox)] 30 tablet 0     Sig: TAKE ONE TABLET BY MOUTH ONCE DAILY    valsartan-hydroCHLOROthiazide (DIOVAN-HCT) 160-12.5 MG per tablet [Pharmacy Med Name: Valsartan-hydroCHLOROthiazide 160-12.5 MG Oral Tablet (Diovan HCT)] 30 tablet 0     Sig: TAKE ONE TABLET BY MOUTH ONCE DAILY    atorvastatin (LIPITOR) 20 MG tablet [Pharmacy Med Name: Atorvastatin Calcium 20 MG Oral Tablet (Lipitor)] 30 tablet 3     Sig: TAKE ONE TABLET BY MOUTH ONCE DAILY       Last Visit Date (If Applicable):  0/4/8171 In office    Next Visit Date:    Visit date not found

## 2022-02-24 RX ORDER — LEVOTHYROXINE SODIUM 112 UG/1
TABLET ORAL
Qty: 30 TABLET | Refills: 0 | Status: SHIPPED | OUTPATIENT
Start: 2022-02-24 | End: 2022-04-01 | Stop reason: SDUPTHER

## 2022-02-24 NOTE — TELEPHONE ENCOUNTER
Steff Chicas is calling to request a refill on the following medication(s):    Medication Request:    Last filled 1/14/2022 #30 with 0 RF  Pended 30 days with note, pt is due for appt.       Requested Prescriptions     Pending Prescriptions Disp Refills    levothyroxine (SYNTHROID) 112 MCG tablet [Pharmacy Med Name: Levothyroxine Sodium 112 MCG Oral Tablet (Euthyrox)] 30 tablet 0     Sig: TAKE ONE TABLET BY MOUTH ONCE DAILY       Last Visit Date (If Applicable):  7/7/1499    Next Visit Date:    Visit date not found

## 2022-02-28 RX ORDER — METOPROLOL SUCCINATE 50 MG/1
TABLET, EXTENDED RELEASE ORAL
Qty: 30 TABLET | Refills: 0 | Status: SHIPPED | OUTPATIENT
Start: 2022-02-28 | End: 2022-04-05 | Stop reason: SDUPTHER

## 2022-02-28 RX ORDER — APIXABAN 5 MG/1
TABLET, FILM COATED ORAL
Qty: 60 TABLET | Refills: 0 | Status: SHIPPED | OUTPATIENT
Start: 2022-02-28 | End: 2022-04-05 | Stop reason: SDUPTHER

## 2022-02-28 RX ORDER — ATORVASTATIN CALCIUM 20 MG/1
TABLET, FILM COATED ORAL
Qty: 30 TABLET | Refills: 0 | Status: SHIPPED | OUTPATIENT
Start: 2022-02-28 | End: 2022-04-05 | Stop reason: SDUPTHER

## 2022-02-28 NOTE — TELEPHONE ENCOUNTER
Candice Ferrer is calling to request a refill on the following medication(s):    Medication Request:  Requested Prescriptions     Pending Prescriptions Disp Refills    atorvastatin (LIPITOR) 20 MG tablet [Pharmacy Med Name: Atorvastatin Calcium 20 MG Oral Tablet (Lipitor)] 30 tablet 0     Sig: TAKE ONE TABLET BY MOUTH ONCE DAILY    ELIQUIS 5 MG TABS tablet [Pharmacy Med Name: Eliquis 5 MG Oral Tablet (Apixaban)] 60 tablet 0     Sig: TAKE ONE TABLET BY MOUTH TWICE A DAY    metoprolol succinate (TOPROL XL) 50 MG extended release tablet [Pharmacy Med Name: Metoprolol Succinate ER 50 MG Oral Tablet Extended Release 24 Hour (Toprol XL)] 90 tablet 0     Sig: TAKE ONE TABLET BY MOUTH ONCE DAILY     Order pending 30 day no refill, message for regi    Last Visit Date (If Applicable):  1/0/9990    Next Visit Date:    Visit date not found

## 2022-03-25 RX ORDER — VALSARTAN AND HYDROCHLOROTHIAZIDE 160; 12.5 MG/1; MG/1
TABLET, FILM COATED ORAL
Qty: 30 TABLET | Refills: 0 | Status: SHIPPED | OUTPATIENT
Start: 2022-03-25 | End: 2022-04-05 | Stop reason: SDUPTHER

## 2022-03-25 RX ORDER — LEVOTHYROXINE SODIUM 112 UG/1
TABLET ORAL
Qty: 30 TABLET | Refills: 0 | OUTPATIENT
Start: 2022-03-25

## 2022-03-25 NOTE — TELEPHONE ENCOUNTER
Magalie Okeefe is calling to request a refill on the following medication(s):    Medication Request:    Last filled 1/14/22 #30 with 0 RF  Pended 30 with note, pt is due for appt.       Requested Prescriptions     Pending Prescriptions Disp Refills    levothyroxine (SYNTHROID) 112 MCG tablet [Pharmacy Med Name: Levothyroxine Sodium 112 MCG Oral Tablet (Euthyrox)] 30 tablet 0     Sig: TAKE ONE TABLET BY MOUTH ONCE DAILY    valsartan-hydroCHLOROthiazide (DIOVAN-HCT) 160-12.5 MG per tablet [Pharmacy Med Name: Valsartan-hydroCHLOROthiazide 160-12.5 MG Oral Tablet (Diovan HCT)] 30 tablet 0     Sig: TAKE ONE TABLET BY MOUTH ONCE DAILY       Last Visit Date (If Applicable):  7/8/3505    Next Visit Date:    Visit date not found

## 2022-04-01 RX ORDER — LEVOTHYROXINE SODIUM 112 UG/1
TABLET ORAL
Qty: 30 TABLET | Refills: 0 | Status: SHIPPED | OUTPATIENT
Start: 2022-04-01 | End: 2022-04-05 | Stop reason: SDUPTHER

## 2022-04-01 NOTE — TELEPHONE ENCOUNTER
Glenny Malin is calling to request a refill on the following medication(s):    Medication Request:  Requested Prescriptions     Pending Prescriptions Disp Refills    levothyroxine (SYNTHROID) 112 MCG tablet 30 tablet 0       Last Visit Date (If Applicable):  6/3/7841    Next Visit Date:    4/5/2022

## 2022-04-05 ENCOUNTER — OFFICE VISIT (OUTPATIENT)
Dept: INTERNAL MEDICINE CLINIC | Age: 67
End: 2022-04-05
Payer: COMMERCIAL

## 2022-04-05 VITALS
HEIGHT: 75 IN | RESPIRATION RATE: 16 BRPM | OXYGEN SATURATION: 98 % | HEART RATE: 66 BPM | SYSTOLIC BLOOD PRESSURE: 124 MMHG | BODY MASS INDEX: 29.07 KG/M2 | WEIGHT: 233.8 LBS | DIASTOLIC BLOOD PRESSURE: 80 MMHG | TEMPERATURE: 98.2 F

## 2022-04-05 DIAGNOSIS — E03.2 HYPOTHYROIDISM DUE TO MEDICATION: ICD-10-CM

## 2022-04-05 DIAGNOSIS — I48.0 PAROXYSMAL ATRIAL FIBRILLATION (HCC): ICD-10-CM

## 2022-04-05 DIAGNOSIS — E78.00 HIGH CHOLESTEROL: ICD-10-CM

## 2022-04-05 DIAGNOSIS — I10 ESSENTIAL HYPERTENSION: Primary | ICD-10-CM

## 2022-04-05 PROCEDURE — 99214 OFFICE O/P EST MOD 30 MIN: CPT | Performed by: INTERNAL MEDICINE

## 2022-04-05 RX ORDER — ATORVASTATIN CALCIUM 20 MG/1
TABLET, FILM COATED ORAL
Qty: 30 TABLET | Refills: 2 | Status: SHIPPED | OUTPATIENT
Start: 2022-04-05 | End: 2022-07-07

## 2022-04-05 RX ORDER — METOPROLOL SUCCINATE 50 MG/1
TABLET, EXTENDED RELEASE ORAL
Qty: 30 TABLET | Refills: 2 | Status: SHIPPED | OUTPATIENT
Start: 2022-04-05 | End: 2022-07-07

## 2022-04-05 RX ORDER — VALSARTAN AND HYDROCHLOROTHIAZIDE 160; 12.5 MG/1; MG/1
TABLET, FILM COATED ORAL
Qty: 30 TABLET | Refills: 2 | Status: SHIPPED | OUTPATIENT
Start: 2022-04-05 | End: 2022-07-25

## 2022-04-05 RX ORDER — LEVOTHYROXINE SODIUM 112 UG/1
TABLET ORAL
Qty: 90 TABLET | Refills: 1 | Status: SHIPPED | OUTPATIENT
Start: 2022-04-05 | End: 2022-10-05 | Stop reason: ALTCHOICE

## 2022-04-05 ASSESSMENT — PATIENT HEALTH QUESTIONNAIRE - PHQ9
SUM OF ALL RESPONSES TO PHQ QUESTIONS 1-9: 0
1. LITTLE INTEREST OR PLEASURE IN DOING THINGS: 0
SUM OF ALL RESPONSES TO PHQ9 QUESTIONS 1 & 2: 0
2. FEELING DOWN, DEPRESSED OR HOPELESS: 0
SUM OF ALL RESPONSES TO PHQ QUESTIONS 1-9: 0

## 2022-04-05 NOTE — PROGRESS NOTES
Luz Barillas is a 77 y.o. male who presents for   Chief Complaint   Patient presents with    Follow-up     medication refills    Health Maintenance     hep c, shingles, colo, pneumo, aaa, covid    and follow up of chronic medical problems. Patient Active Problem List   Diagnosis    Hyperlipidemia    Hypertension    Hypothyroidism    Epistaxis    Nevus    Basal cell carcinoma of nose    Hypogonadism in male    Localized edema    Atrial fibrillation with RVR (HCC)    New onset a-fib (HCC)    Alcohol use    Acute renal insufficiency    Azotemia    Elevated brain natriuretic peptide (BNP) level    Low HDL (under 40)    Left ventricular systolic dysfunction     HPI  Here for follow-up on blood pressure and cholesterol denies any new complaints    Current Outpatient Medications   Medication Sig Dispense Refill    metoprolol succinate (TOPROL XL) 50 MG extended release tablet TAKE ONE TABLET BY MOUTH ONCE DAILY 30 tablet 2    apixaban (ELIQUIS) 5 MG TABS tablet TAKE ONE TABLET BY MOUTH TWICE A DAY 60 tablet 2    atorvastatin (LIPITOR) 20 MG tablet TAKE ONE TABLET BY MOUTH ONCE DAILY 30 tablet 2    valsartan-hydroCHLOROthiazide (DIOVAN-HCT) 160-12.5 MG per tablet TAKE ONE TABLET BY MOUTH ONCE DAILY 30 tablet 2    levothyroxine (SYNTHROID) 112 MCG tablet TAKE ONE TABLET BY MOUTH ONCE DAILY 90 tablet 1    Ascorbic Acid (VITAMIN C) 250 MG tablet Take 1,000 mg by mouth daily       No current facility-administered medications for this visit.        No Known Allergies    Past Medical History:   Diagnosis Date    Alcohol use     Hyperlipidemia     Hypertension     Thyroid disease        Past Surgical History:   Procedure Laterality Date    APPENDECTOMY      CARDIOVASCULAR STRESS TEST      10-15 yrs ago    HERNIA REPAIR         Family History   Problem Relation Age of Onset    Heart Disease Mother         OHS/ afib    COPD Father         lung disease    Alcohol Abuse Father ROS   Constitutional:  Negative for fatigue, loss of appetite and unexpected weight change   HEENT            : Negative for neck stiffness and pain, no congestion or sinus pressure   Eyes                : No visual disturbance or pain   Cardiovascular: No chest pain or palpitations or leg swelling   Respiratory      : Negative for cough, shortness of breath or wheezing   Gastrointestinal: Negative for abdominal pain, constipation or diarrhea and bloating No nausea or vomiting   Genitourinary:     No urgency or frequency, no burning or hematuria   Musculoskeletal: No arthralgias, back pain or myalgias   Skin                  : Negative for rash or erythema   Neurological    : Negative for dizziness, weakness, tremors ,light headedness or syncope   Psychiatric       : Negative for dysphoric mood, sleep disturbances, nervous or anxious, or decreased concentration   All other review of systems was negative    Objective  Physical Examination:    Nursing note reviewed    /80 (Site: Right Upper Arm, Position: Sitting, Cuff Size: Medium Adult)   Pulse 66   Temp 98.2 °F (36.8 °C) (Temporal)   Resp 16   Ht 6' 2.96\" (1.904 m)   Wt 233 lb 12.8 oz (106.1 kg)   SpO2 98%   BMI 29.25 kg/m²   BP Readings from Last 3 Encounters:   04/05/22 124/80   07/08/21 122/80   06/21/21 (!) 146/88         Constitutional:  Jarred Mina is oriented to place, person and time ,appears well-developed and well-nourished  HEENT:  Atraumatic and normocephalic, external ears normal bilaterally, nose normal no oropharyngeal exudate and is clear and moist  Eyes:  EOCM normal; conjunctivae normal; PERRLA bilaterally  Neck:  Normal range of motion, neck supple, no JVD and no thyromegaly  Cardiovascular:  RRR, normal heart sounds and intact distal pulses  Pulmonary:  effort normal and breath sounds normal bilaterally,no wheezes or rales, no respiratory distress  Abdominal:  Soft, non-tender; normal bowel sounds, no masses  Musculoskeletal:  Normal range of motion and no edema or tenderness bilaterally  No lymphadenopathy  Neurological:  alert, oriented, and normal reflexes bilaterally  Skin: warm and dry  Psychiatric:  normal mood and effect; behavior normal.    Labs:   Lab Results   Component Value Date    LABA1C 5.6 12/07/2016     Lab Results   Component Value Date    CHOL 167 06/03/2021     Lab Results   Component Value Date    HDL 46 06/03/2021     Lab Results   Component Value Date    LDLCALC 105 06/05/2017     Lab Results   Component Value Date    TRIG 68 06/03/2021     No components found for: [place], [place]  Lab Results   Component Value Date    WBC 4.7 06/03/2021    HGB 13.9 06/03/2021    HCT 43.0 06/03/2021    MCV 93.3 06/03/2021     06/03/2021     Lab Results   Component Value Date    INR 1.0 04/07/2021    PROTIME 10.0 04/07/2021     Lab Results   Component Value Date    GLUCOSE 78 06/03/2021    CREATININE 0.90 06/03/2021    BUN 14 06/03/2021     (H) 06/03/2021    K 4.2 06/03/2021     06/03/2021    CO2 28 06/03/2021     Lab Results   Component Value Date    ALT 16 06/03/2021    AST 16 06/03/2021    ALKPHOS 64 06/03/2021    BILITOT 0.66 06/03/2021     Lab Results   Component Value Date    LABALBU 4.2 06/03/2021     Lab Results   Component Value Date    TSH 2.71 06/03/2021     Assessment:  1. Essential hypertension    2. Paroxysmal atrial fibrillation (HCC)    3. High cholesterol    4.  Hypothyroidism due to medication        Plan:  Patient's blood pressure stable on current medications include valsartan hydrochlorothiazide and metoprolol  Patient is on apixaban for paroxysmal atrial fibrillation and patient had a abnormal stress test back in June and patient never went further testing as it was not scheduled in the proper way according to the patient and he will be seeing the heart doctor again this month to discuss about the further testing options  Patient's TSH is normal at 2.71 in June last year and will repeat at next visit and continue current dose of Synthroid  Patient's cholesterol profile is stable and continue Lipitor  Review in 6 months           1. Zeenat Pitts received counseling on the following healthy behaviors: nutrition and exercise    2. Prior labs and health maintenance reviewed. 3.  Discussed use, benefit, and side effects of prescribed medications. Barriers to medication compliance addressed. All his questions were answered. Pt voiced understanding. Zeenat Pitts will continue current medications, diet and exercise. Orders Placed This Encounter   Medications    metoprolol succinate (TOPROL XL) 50 MG extended release tablet     Sig: TAKE ONE TABLET BY MOUTH ONCE DAILY     Dispense:  30 tablet     Refill:  2    apixaban (ELIQUIS) 5 MG TABS tablet     Sig: TAKE ONE TABLET BY MOUTH TWICE A DAY     Dispense:  60 tablet     Refill:  2    atorvastatin (LIPITOR) 20 MG tablet     Sig: TAKE ONE TABLET BY MOUTH ONCE DAILY     Dispense:  30 tablet     Refill:  2    valsartan-hydroCHLOROthiazide (DIOVAN-HCT) 160-12.5 MG per tablet     Sig: TAKE ONE TABLET BY MOUTH ONCE DAILY     Dispense:  30 tablet     Refill:  2    levothyroxine (SYNTHROID) 112 MCG tablet     Sig: TAKE ONE TABLET BY MOUTH ONCE DAILY     Dispense:  90 tablet     Refill:  1     Due for appt prior to next refill. Please call office to schedule.           Completed Refills               Requested Prescriptions     Signed Prescriptions Disp Refills    metoprolol succinate (TOPROL XL) 50 MG extended release tablet 30 tablet 2     Sig: TAKE ONE TABLET BY MOUTH ONCE DAILY    apixaban (ELIQUIS) 5 MG TABS tablet 60 tablet 2     Sig: TAKE ONE TABLET BY MOUTH TWICE A DAY    atorvastatin (LIPITOR) 20 MG tablet 30 tablet 2     Sig: TAKE ONE TABLET BY MOUTH ONCE DAILY    valsartan-hydroCHLOROthiazide (DIOVAN-HCT) 160-12.5 MG per tablet 30 tablet 2     Sig: TAKE ONE TABLET BY MOUTH ONCE DAILY    levothyroxine (SYNTHROID) 112 MCG tablet 90 tablet 1     Sig: TAKE ONE TABLET BY MOUTH ONCE DAILY     4. Patient given educational materials - see patient instructions    5. Was a self-tracking handout given in paper form or via [employer]? NO    No orders of the defined types were placed in this encounter. Return in about 6 months (around 10/5/2022). Patient voiced understanding and agreed to treatment plan. Electronically signed by Brenda Lockhart MD on 4/5/2022 at 2:41 PM    This note is created with a voice recognition program and while intend to generate a document that accurately reflects the content of the visit, no guarantee can be provided that every mistake has been identified and corrected by editing.

## 2022-04-15 ENCOUNTER — TELEPHONE (OUTPATIENT)
Dept: INTERNAL MEDICINE CLINIC | Age: 67
End: 2022-04-15

## 2022-04-15 DIAGNOSIS — J02.9 SORE THROAT: Primary | ICD-10-CM

## 2022-04-15 RX ORDER — CEPHALEXIN 500 MG/1
500 CAPSULE ORAL 3 TIMES DAILY
Qty: 30 CAPSULE | Refills: 0 | Status: SHIPPED | OUTPATIENT
Start: 2022-04-15 | End: 2022-04-25

## 2022-04-15 NOTE — TELEPHONE ENCOUNTER
Patient sore throat hurts to swallow, no other symptoms. Gets this every year, asking for keflex to be called in?     Please advise    [facility]

## 2022-04-18 RX ORDER — VALSARTAN AND HYDROCHLOROTHIAZIDE 160; 12.5 MG/1; MG/1
TABLET, FILM COATED ORAL
Qty: 30 TABLET | Refills: 2 | OUTPATIENT
Start: 2022-04-18

## 2022-04-18 RX ORDER — ATORVASTATIN CALCIUM 20 MG/1
TABLET, FILM COATED ORAL
Qty: 30 TABLET | Refills: 2 | OUTPATIENT
Start: 2022-04-18

## 2022-04-18 NOTE — TELEPHONE ENCOUNTER
Erickakayleen Crocker is calling to request a refill on the following medication(s):    Medication Request:  Requested Prescriptions     Pending Prescriptions Disp Refills    valsartan-hydroCHLOROthiazide (DIOVAN-HCT) 160-12.5 MG per tablet [Pharmacy Med Name: Valsartan-hydroCHLOROthiazide 160-12.5 MG Oral Tablet (Diovan HCT)] 30 tablet 2     Sig: TAKE ONE TABLET BY MOUTH ONCE DAILY    atorvastatin (LIPITOR) 20 MG tablet [Pharmacy Med Name: Atorvastatin Calcium 20 MG Oral Tablet (Lipitor)] 30 tablet 2     Sig: TAKE ONE TABLET BY MOUTH ONCE DAILY     Last Fill: 4/5/22  Last Visit Date (If Applicable):  1/5/5587    Next Visit Date:    10/5/2022

## 2022-05-03 RX ORDER — LEVOTHYROXINE SODIUM 112 UG/1
TABLET ORAL
Qty: 30 TABLET | OUTPATIENT
Start: 2022-05-03

## 2022-07-07 RX ORDER — ATORVASTATIN CALCIUM 20 MG/1
TABLET, FILM COATED ORAL
Qty: 30 TABLET | Refills: 3 | Status: SHIPPED | OUTPATIENT
Start: 2022-07-07 | End: 2022-11-02

## 2022-07-07 RX ORDER — METOPROLOL SUCCINATE 50 MG/1
TABLET, EXTENDED RELEASE ORAL
Qty: 30 TABLET | Refills: 3 | Status: SHIPPED | OUTPATIENT
Start: 2022-07-07 | End: 2022-11-02

## 2022-07-07 NOTE — TELEPHONE ENCOUNTER
Anibal Handley is calling to request a refill on the following medication(s):    Medication Request:  Requested Prescriptions     Pending Prescriptions Disp Refills    atorvastatin (LIPITOR) 20 MG tablet [Pharmacy Med Name: Atorvastatin Calcium 20 MG Oral Tablet (Lipitor)] 30 tablet 2     Sig: TAKE ONE TABLET BY MOUTH ONCE DAILY    apixaban (ELIQUIS) 5 MG TABS tablet [Pharmacy Med Name: Eliquis 5 MG Oral Tablet (Apixaban)] 60 tablet 2     Sig: TAKE ONE TABLET BY MOUTH TWICE A DAY    metoprolol succinate (TOPROL XL) 50 MG extended release tablet [Pharmacy Med Name: Metoprolol Succinate ER 50 MG Oral Tablet Extended Release 24 Hour (Toprol XL)] 30 tablet 2     Sig: TAKE ONE TABLET BY MOUTH ONCE DAILY     Last Fill: 4/5/22  Last Visit Date (If Applicable):  7/7/6377    Next Visit Date:    10/5/2022

## 2022-07-15 ENCOUNTER — TELEPHONE (OUTPATIENT)
Dept: INTERNAL MEDICINE CLINIC | Age: 67
End: 2022-07-15

## 2022-07-15 NOTE — TELEPHONE ENCOUNTER
Patient has the same sore throat thing going on just like he had in April. He is asking if you will call in more Keflex?     Know you are out of the office until Monday    Please advise

## 2022-07-18 RX ORDER — PANTOPRAZOLE SODIUM 40 MG/1
40 TABLET, DELAYED RELEASE ORAL
Qty: 30 TABLET | Refills: 0 | OUTPATIENT
Start: 2022-07-18 | End: 2022-10-05 | Stop reason: ALTCHOICE

## 2022-07-25 RX ORDER — VALSARTAN AND HYDROCHLOROTHIAZIDE 160; 12.5 MG/1; MG/1
TABLET, FILM COATED ORAL
Qty: 30 TABLET | Refills: 0 | Status: SHIPPED | OUTPATIENT
Start: 2022-07-25 | End: 2022-08-25

## 2022-07-25 NOTE — TELEPHONE ENCOUNTER
Cynthia King is calling to request a refill on the following medication(s):    Last Visit Date (If Applicable):  4/0/9318    Next Visit Date:    7/28/2022    Medication Request:  Requested Prescriptions     Pending Prescriptions Disp Refills    valsartan-hydroCHLOROthiazide (DIOVAN-HCT) 160-12.5 MG per tablet [Pharmacy Med Name: Valsartan-hydroCHLOROthiazide 160-12.5 MG Oral Tablet (Diovan HCT)] 30 tablet 2     Sig: TAKE ONE TABLET BY MOUTH ONCE DAILY

## 2022-07-28 ENCOUNTER — HOSPITAL ENCOUNTER (OUTPATIENT)
Age: 67
Setting detail: SPECIMEN
Discharge: HOME | End: 2022-07-28

## 2022-07-28 ENCOUNTER — OFFICE VISIT (OUTPATIENT)
Dept: INTERNAL MEDICINE CLINIC | Age: 67
End: 2022-07-28
Payer: COMMERCIAL

## 2022-07-28 VITALS
HEART RATE: 67 BPM | BODY MASS INDEX: 28.97 KG/M2 | OXYGEN SATURATION: 98 % | HEIGHT: 75 IN | DIASTOLIC BLOOD PRESSURE: 68 MMHG | WEIGHT: 233 LBS | SYSTOLIC BLOOD PRESSURE: 98 MMHG

## 2022-07-28 DIAGNOSIS — E78.00 HIGH CHOLESTEROL: ICD-10-CM

## 2022-07-28 DIAGNOSIS — R53.83 OTHER FATIGUE: ICD-10-CM

## 2022-07-28 DIAGNOSIS — J02.9 SORE THROAT: ICD-10-CM

## 2022-07-28 DIAGNOSIS — I10 ESSENTIAL HYPERTENSION: Primary | ICD-10-CM

## 2022-07-28 DIAGNOSIS — I10 ESSENTIAL HYPERTENSION: ICD-10-CM

## 2022-07-28 LAB
ALBUMIN SERPL-MCNC: 4.2 G/DL (ref 3.5–5.2)
ALBUMIN/GLOBULIN RATIO: 1.8 (ref 1–2.5)
ALP BLD-CCNC: 62 U/L (ref 40–129)
ALT SERPL-CCNC: 16 U/L (ref 5–41)
ANION GAP SERPL CALCULATED.3IONS-SCNC: 12 MMOL/L (ref 9–17)
AST SERPL-CCNC: 17 U/L
BILIRUB SERPL-MCNC: 0.49 MG/DL (ref 0.3–1.2)
BUN BLDV-MCNC: 21 MG/DL (ref 8–23)
CALCIUM SERPL-MCNC: 9.3 MG/DL (ref 8.6–10.4)
CHLORIDE BLD-SCNC: 104 MMOL/L (ref 98–107)
CHOLESTEROL/HDL RATIO: 4
CHOLESTEROL: 141 MG/DL
CO2: 22 MMOL/L (ref 20–31)
CREAT SERPL-MCNC: 1.14 MG/DL (ref 0.7–1.2)
GFR AFRICAN AMERICAN: >60 ML/MIN
GFR NON-AFRICAN AMERICAN: >60 ML/MIN
GFR SERPL CREATININE-BSD FRML MDRD: ABNORMAL ML/MIN/{1.73_M2}
GLUCOSE BLD-MCNC: 90 MG/DL (ref 70–99)
HCT VFR BLD CALC: 41.7 % (ref 40.7–50.3)
HDLC SERPL-MCNC: 35 MG/DL
HEMOGLOBIN: 13.9 G/DL (ref 13–17)
LDL CHOLESTEROL: 92 MG/DL (ref 0–130)
MAGNESIUM: 2 MG/DL (ref 1.6–2.6)
MCH RBC QN AUTO: 30.2 PG (ref 25.2–33.5)
MCHC RBC AUTO-ENTMCNC: 33.3 G/DL (ref 28.4–34.8)
MCV RBC AUTO: 90.7 FL (ref 82.6–102.9)
NRBC AUTOMATED: 0 PER 100 WBC
PDW BLD-RTO: 13 % (ref 11.8–14.4)
PLATELET # BLD: 254 K/UL (ref 138–453)
PMV BLD AUTO: 11.9 FL (ref 8.1–13.5)
POTASSIUM SERPL-SCNC: 3.5 MMOL/L (ref 3.7–5.3)
PROSTATE SPECIFIC ANTIGEN: 5 NG/ML
RBC # BLD: 4.6 M/UL (ref 4.21–5.77)
SODIUM BLD-SCNC: 138 MMOL/L (ref 135–144)
THYROXINE, FREE: 1.33 NG/DL (ref 0.93–1.7)
TOTAL CK: 84 U/L (ref 39–308)
TOTAL PROTEIN: 6.6 G/DL (ref 6.4–8.3)
TRIGL SERPL-MCNC: 68 MG/DL
TSH SERPL DL<=0.05 MIU/L-ACNC: 7.28 UIU/ML (ref 0.3–5)
VITAMIN B-12: >2000 PG/ML (ref 232–1245)
WBC # BLD: 5.7 K/UL (ref 3.5–11.3)

## 2022-07-28 PROCEDURE — 99214 OFFICE O/P EST MOD 30 MIN: CPT | Performed by: INTERNAL MEDICINE

## 2022-07-28 PROCEDURE — 1123F ACP DISCUSS/DSCN MKR DOCD: CPT | Performed by: INTERNAL MEDICINE

## 2022-07-28 RX ORDER — MONTELUKAST SODIUM 10 MG/1
10 TABLET ORAL DAILY
Qty: 30 TABLET | Refills: 3 | Status: SHIPPED | OUTPATIENT
Start: 2022-07-28 | End: 2022-10-05 | Stop reason: ALTCHOICE

## 2022-07-28 RX ORDER — AMOXICILLIN AND CLAVULANATE POTASSIUM 875; 125 MG/1; MG/1
1 TABLET, FILM COATED ORAL 2 TIMES DAILY
Qty: 20 TABLET | Refills: 0 | Status: SHIPPED | OUTPATIENT
Start: 2022-07-28 | End: 2022-08-07

## 2022-07-28 SDOH — ECONOMIC STABILITY: FOOD INSECURITY: WITHIN THE PAST 12 MONTHS, YOU WORRIED THAT YOUR FOOD WOULD RUN OUT BEFORE YOU GOT MONEY TO BUY MORE.: NEVER TRUE

## 2022-07-28 SDOH — ECONOMIC STABILITY: FOOD INSECURITY: WITHIN THE PAST 12 MONTHS, THE FOOD YOU BOUGHT JUST DIDN'T LAST AND YOU DIDN'T HAVE MONEY TO GET MORE.: NEVER TRUE

## 2022-07-28 ASSESSMENT — SOCIAL DETERMINANTS OF HEALTH (SDOH): HOW HARD IS IT FOR YOU TO PAY FOR THE VERY BASICS LIKE FOOD, HOUSING, MEDICAL CARE, AND HEATING?: NOT HARD AT ALL

## 2022-07-28 NOTE — PROGRESS NOTES
Jamal Armijo is a 79 y.o. male who presents for   Chief Complaint   Patient presents with    Pharyngitis    Fatigue    and follow up of chronic medical problems. Patient Active Problem List   Diagnosis    Hyperlipidemia    Hypertension    Hypothyroidism    Epistaxis    Nevus    Basal cell carcinoma of nose    Hypogonadism in male    Localized edema    Atrial fibrillation with RVR (HCC)    New onset a-fib (HCC)    Alcohol use    Acute renal insufficiency    Azotemia    Elevated brain natriuretic peptide (BNP) level    Low HDL (under 40)    Left ventricular systolic dysfunction     HPI  Here for follow-up on sore throat still having symptoms and also complaining of shortness of breath and extreme fatigue    Current Outpatient Medications   Medication Sig Dispense Refill    amoxicillin-clavulanate (AUGMENTIN) 875-125 MG per tablet Take 1 tablet by mouth in the morning and 1 tablet before bedtime. Do all this for 10 days. 20 tablet 0    montelukast (SINGULAIR) 10 MG tablet Take 1 tablet by mouth in the morning. 30 tablet 3    valsartan-hydroCHLOROthiazide (DIOVAN-HCT) 160-12.5 MG per tablet TAKE ONE TABLET BY MOUTH ONCE DAILY 30 tablet 0    pantoprazole (PROTONIX) 40 MG tablet Take 1 tablet by mouth every morning (before breakfast) 30 tablet 0    atorvastatin (LIPITOR) 20 MG tablet TAKE ONE TABLET BY MOUTH ONCE DAILY 30 tablet 3    apixaban (ELIQUIS) 5 MG TABS tablet TAKE ONE TABLET BY MOUTH TWICE A DAY 60 tablet 3    metoprolol succinate (TOPROL XL) 50 MG extended release tablet TAKE ONE TABLET BY MOUTH ONCE DAILY 30 tablet 3    levothyroxine (SYNTHROID) 112 MCG tablet TAKE ONE TABLET BY MOUTH ONCE DAILY 90 tablet 1    Ascorbic Acid (VITAMIN C) 250 MG tablet Take 1,000 mg by mouth daily       No current facility-administered medications for this visit.        No Known Allergies    Past Medical History:   Diagnosis Date    Alcohol use     Hyperlipidemia     Hypertension     Thyroid disease        Past Surgical History: Procedure Laterality Date    APPENDECTOMY      CARDIOVASCULAR STRESS TEST      10-15 yrs ago    HERNIA REPAIR         Family History   Problem Relation Age of Onset    Heart Disease Mother         OHS/ afib    COPD Father         lung disease    Alcohol Abuse Father      ROS  Constitutional:  Negative for fatigue, loss of appetite and unexpected weight change  HEENT            : Negative for neck stiffness and pain, no congestion or sinus pressure  Eyes                : No visual disturbance or pain  Cardiovascular: No chest pain or palpitations or leg swelling  Respiratory      : Negative for cough, shortness of breath or wheezing  Gastrointestinal: Negative for abdominal pain, constipation or diarrhea and bloating No nausea or vomiting  Genitourinary:     No urgency or frequency, no burning or hematuria  Musculoskeletal: No arthralgias, back pain or myalgias  Skin                  : Negative for rash or erythema  Neurological    : Negative for dizziness, weakness, tremors ,light headedness or syncope  Psychiatric       : Negative for dysphoric mood, sleep disturbances, nervous or anxious, or decreased concentration  All other review of systems was negative    Objective  Physical Examination:    Nursing note reviewed    BP 98/68 (Site: Left Upper Arm, Position: Sitting, Cuff Size: Medium Adult)   Pulse 67   Ht 6' 3\" (1.905 m)   Wt 233 lb (105.7 kg)   SpO2 98%   BMI 29.12 kg/m²   BP Readings from Last 3 Encounters:   07/28/22 98/68   04/05/22 124/80   07/08/21 122/80         Constitutional:  Jabier Sow is oriented to place, person and time ,appears well-developed and well-nourished  HEENT:  Atraumatic and normocephalic, external ears normal bilaterally, nose normal no oropharyngeal exudate and is clear and moist  Eyes:  EOCM normal; conjunctivae normal; PERRLA bilaterally  Neck:  Normal range of motion, neck supple, no JVD and no thyromegaly  Cardiovascular:  RRR, normal heart sounds and intact distal pulses  Pulmonary:  effort normal and breath sounds normal bilaterally,no wheezes or rales, no respiratory distress  Abdominal:  Soft, non-tender; normal bowel sounds, no masses  Musculoskeletal:  Normal range of motion and no edema or tenderness bilaterally  No lymphadenopathy  Neurological:  alert, oriented, and normal reflexes bilaterally  Skin: warm and dry  Psychiatric:  normal mood and effect; behavior normal.    Labs:   Lab Results   Component Value Date    LABA1C 5.6 12/07/2016     Lab Results   Component Value Date    CHOL 167 06/03/2021     Lab Results   Component Value Date    HDL 46 06/03/2021     Lab Results   Component Value Date    LDLCALC 105 06/05/2017     Lab Results   Component Value Date    TRIG 68 06/03/2021     No results found for: [place], [place]  Lab Results   Component Value Date    WBC 4.7 06/03/2021    HGB 13.9 06/03/2021    HCT 43.0 06/03/2021    MCV 93.3 06/03/2021     06/03/2021     Lab Results   Component Value Date    INR 1.0 04/07/2021    PROTIME 10.0 04/07/2021     Lab Results   Component Value Date    GLUCOSE 78 06/03/2021    CREATININE 0.90 06/03/2021    BUN 14 06/03/2021     (H) 06/03/2021    K 4.2 06/03/2021     06/03/2021    CO2 28 06/03/2021     Lab Results   Component Value Date    ALT 16 06/03/2021    AST 16 06/03/2021    ALKPHOS 64 06/03/2021    BILITOT 0.66 06/03/2021     Lab Results   Component Value Date    LABALBU 4.2 06/03/2021     Lab Results   Component Value Date    TSH 2.71 06/03/2021     Assessment:  1. Essential hypertension    2. Sore throat    3. High cholesterol    4.  Other fatigue        Plan:  Patient was started on Protonix and patient not sure whether that medicine was helping or not with his sore throat and advised him to continue and complete the course of 4 weeks and also advised patient to start on Augmentin 875 mg twice daily for 10 days and Singulair 10 mg daily and to call me back in 2 weeks if no improvement will refer to ENT  Patient Standing Status:   Future     Standing Expiration Date:   7/28/2023     Order Specific Question:   Is Patient Fasting?/# of Hours     Answer:   12    Vitamin B12     Standing Status:   Future     Standing Expiration Date:   7/28/2023    Magnesium     Standing Status:   Future     Standing Expiration Date:   7/28/2023    CBC     Standing Status:   Future     Standing Expiration Date:   7/28/2023    PSA, Diagnostic     Standing Status:   Future     Standing Expiration Date:   7/28/2023    TSH     Standing Status:   Future     Standing Expiration Date:   7/28/2023    T4, Free     Standing Status:   Future     Standing Expiration Date:   7/28/2023     No follow-ups on file. Patient voiced understanding and agreed to treatment plan. Electronically signed by Boris Boyle MD on 7/28/2022 at 1:01 PM    This note is created with a voice recognition program and while intend to generate a document that accurately reflects the content of the visit, no guarantee can be provided that every mistake has been identified and corrected by editing.

## 2022-08-09 ENCOUNTER — TELEPHONE (OUTPATIENT)
Dept: INTERNAL MEDICINE CLINIC | Age: 67
End: 2022-08-09

## 2022-08-09 DIAGNOSIS — E03.9 ACQUIRED HYPOTHYROIDISM: ICD-10-CM

## 2022-08-09 DIAGNOSIS — R97.20 ELEVATED PSA: Primary | ICD-10-CM

## 2022-08-09 NOTE — TELEPHONE ENCOUNTER
Gary was suppose to call you in 10 days    Patient calling in stating that he was doing ok until Saturday, then he was sick again. Throat sore and closing, headache, no energy, weak shakes    Also asking how his lab work turned out?     Please advise

## 2022-08-10 RX ORDER — LEVOTHYROXINE SODIUM 0.12 MG/1
125 TABLET ORAL DAILY
Qty: 30 TABLET | Refills: 3 | Status: SHIPPED | OUTPATIENT
Start: 2022-08-10

## 2022-08-25 RX ORDER — VALSARTAN AND HYDROCHLOROTHIAZIDE 160; 12.5 MG/1; MG/1
TABLET, FILM COATED ORAL
Qty: 30 TABLET | Refills: 0 | Status: SHIPPED | OUTPATIENT
Start: 2022-08-25 | End: 2022-09-26

## 2022-08-25 NOTE — TELEPHONE ENCOUNTER
Tereza Garay is calling to request a refill on the following medication(s):    Medication Request:  Requested Prescriptions     Pending Prescriptions Disp Refills    valsartan-hydroCHLOROthiazide (DIOVAN-HCT) 160-12.5 MG per tablet [Pharmacy Med Name: Valsartan-hydroCHLOROthiazide 160-12.5 MG Oral Tablet (Diovan HCT)] 30 tablet 0     Sig: TAKE ONE TABLET BY MOUTH ONCE DAILY       Last Visit Date (If Applicable):  8/41/4106    Next Visit Date:    10/5/2022

## 2022-09-23 NOTE — TELEPHONE ENCOUNTER
Isis Marc is calling to request a refill on the following medication(s):    Medication Request:  Requested Prescriptions     Pending Prescriptions Disp Refills    valsartan-hydroCHLOROthiazide (DIOVAN-HCT) 160-12.5 MG per tablet [Pharmacy Med Name: Valsartan-hydroCHLOROthiazide 160-12.5 MG Oral Tablet (Diovan HCT)] 30 tablet 0     Sig: TAKE ONE TABLET BY MOUTH ONCE DAILY       Last Visit Date (If Applicable):  1/08/6883    Next Visit Date:    10/5/2022

## 2022-09-26 RX ORDER — VALSARTAN AND HYDROCHLOROTHIAZIDE 160; 12.5 MG/1; MG/1
TABLET, FILM COATED ORAL
Qty: 30 TABLET | Refills: 0 | Status: SHIPPED | OUTPATIENT
Start: 2022-09-26 | End: 2022-10-24

## 2022-10-05 ENCOUNTER — OFFICE VISIT (OUTPATIENT)
Dept: INTERNAL MEDICINE CLINIC | Age: 67
End: 2022-10-05
Payer: COMMERCIAL

## 2022-10-05 ENCOUNTER — HOSPITAL ENCOUNTER (OUTPATIENT)
Age: 67
Setting detail: SPECIMEN
Discharge: HOME | End: 2022-10-05

## 2022-10-05 VITALS
OXYGEN SATURATION: 96 % | BODY MASS INDEX: 28.42 KG/M2 | RESPIRATION RATE: 16 BRPM | WEIGHT: 228.6 LBS | HEIGHT: 75 IN | DIASTOLIC BLOOD PRESSURE: 64 MMHG | SYSTOLIC BLOOD PRESSURE: 116 MMHG | HEART RATE: 61 BPM | TEMPERATURE: 97.7 F

## 2022-10-05 DIAGNOSIS — E03.9 ACQUIRED HYPOTHYROIDISM: ICD-10-CM

## 2022-10-05 DIAGNOSIS — R97.20 ELEVATED PSA: ICD-10-CM

## 2022-10-05 DIAGNOSIS — I25.9 ISCHEMIC HEART DISEASE: ICD-10-CM

## 2022-10-05 DIAGNOSIS — I10 ESSENTIAL HYPERTENSION: ICD-10-CM

## 2022-10-05 DIAGNOSIS — E03.2 HYPOTHYROIDISM DUE TO MEDICATION: ICD-10-CM

## 2022-10-05 DIAGNOSIS — J02.9 SORE THROAT: ICD-10-CM

## 2022-10-05 DIAGNOSIS — R97.20 ELEVATED PSA: Primary | ICD-10-CM

## 2022-10-05 DIAGNOSIS — E78.00 HIGH CHOLESTEROL: ICD-10-CM

## 2022-10-05 LAB
THYROXINE, FREE: 1.26 NG/DL (ref 0.93–1.7)
TSH SERPL DL<=0.05 MIU/L-ACNC: 2.38 UIU/ML (ref 0.3–5)

## 2022-10-05 PROCEDURE — 99215 OFFICE O/P EST HI 40 MIN: CPT | Performed by: INTERNAL MEDICINE

## 2022-10-05 PROCEDURE — 1123F ACP DISCUSS/DSCN MKR DOCD: CPT | Performed by: INTERNAL MEDICINE

## 2022-10-05 RX ORDER — TAMSULOSIN HYDROCHLORIDE 0.4 MG/1
CAPSULE ORAL
COMMUNITY
Start: 2022-09-26

## 2022-10-05 RX ORDER — PANTOPRAZOLE SODIUM 40 MG/1
40 TABLET, DELAYED RELEASE ORAL
Qty: 90 TABLET | Refills: 0 | Status: SHIPPED | OUTPATIENT
Start: 2022-10-05

## 2022-10-05 ASSESSMENT — PATIENT HEALTH QUESTIONNAIRE - PHQ9
SUM OF ALL RESPONSES TO PHQ QUESTIONS 1-9: 0
1. LITTLE INTEREST OR PLEASURE IN DOING THINGS: 0
SUM OF ALL RESPONSES TO PHQ QUESTIONS 1-9: 0
SUM OF ALL RESPONSES TO PHQ9 QUESTIONS 1 & 2: 0
2. FEELING DOWN, DEPRESSED OR HOPELESS: 0

## 2022-10-05 NOTE — PROGRESS NOTES
Hakan Ferrer is a 79 y.o. male who presents for   Chief Complaint   Patient presents with    Hypertension     6 month follow up    [address], flu, pneumo, shingles, hep c    and follow up of chronic medical problems. Patient Active Problem List   Diagnosis    Hyperlipidemia    Hypertension    Hypothyroidism    Epistaxis    Nevus    Basal cell carcinoma of nose    Hypogonadism in male    Localized edema    Atrial fibrillation with RVR (HCC)    New onset a-fib (HCC)    Alcohol use    Acute renal insufficiency    Azotemia    Elevated brain natriuretic peptide (BNP) level    Low HDL (under 40)    Left ventricular systolic dysfunction     HPI  Here for follow-up after the urology visit and PSA elevation and also patient wants to discuss about cough and acid reflux and about the cardiology visit    Current Outpatient Medications   Medication Sig Dispense Refill    tamsulosin (FLOMAX) 0.4 MG capsule       pantoprazole (PROTONIX) 40 MG tablet Take 1 tablet by mouth every morning (before breakfast) 90 tablet 0    valsartan-hydroCHLOROthiazide (DIOVAN-HCT) 160-12.5 MG per tablet TAKE ONE TABLET BY MOUTH ONCE DAILY 30 tablet 0    levothyroxine (SYNTHROID) 125 MCG tablet Take 1 tablet by mouth in the morning. 30 tablet 3    atorvastatin (LIPITOR) 20 MG tablet TAKE ONE TABLET BY MOUTH ONCE DAILY 30 tablet 3    apixaban (ELIQUIS) 5 MG TABS tablet TAKE ONE TABLET BY MOUTH TWICE A DAY 60 tablet 3    metoprolol succinate (TOPROL XL) 50 MG extended release tablet TAKE ONE TABLET BY MOUTH ONCE DAILY 30 tablet 3    Ascorbic Acid (VITAMIN C) 250 MG tablet Take 1,000 mg by mouth daily       No current facility-administered medications for this visit.        No Known Allergies    Past Medical History:   Diagnosis Date    Alcohol use     Hyperlipidemia     Hypertension     Thyroid disease        Past Surgical History:   Procedure Laterality Date    APPENDECTOMY      CARDIOVASCULAR STRESS TEST      10-15 yrs ago    HERNIA REPAIR         Family History   Problem Relation Age of Onset    Heart Disease Mother         OHS/ afib    COPD Father         lung disease    Alcohol Abuse Father      ROS  Constitutional:  Negative for fatigue, loss of appetite and unexpected weight change  HEENT            : Negative for neck stiffness and pain, no congestion or sinus pressure  Eyes                : No visual disturbance or pain  Cardiovascular: No chest pain or palpitations or leg swelling  Respiratory      : Negative for cough, shortness of breath or wheezing  Gastrointestinal: Negative for abdominal pain, constipation or diarrhea and bloating No nausea or vomiting  Genitourinary:     No urgency or frequency, no burning or hematuria  Musculoskeletal: No arthralgias, back pain or myalgias  Skin                  : Negative for rash or erythema  Neurological    : Negative for dizziness, weakness, tremors ,light headedness or syncope  Psychiatric       : Negative for dysphoric mood, sleep disturbances, nervous or anxious, or decreased concentration  All other review of systems was negative    Objective  Physical Examination:    Nursing note reviewed    /64 (Site: Right Upper Arm, Position: Sitting, Cuff Size: Medium Adult)   Pulse 61   Temp 97.7 °F (36.5 °C) (Temporal)   Resp 16   Ht 6' 3\" (1.905 m)   Wt 228 lb 9.6 oz (103.7 kg)   SpO2 96%   BMI 28.57 kg/m²   BP Readings from Last 3 Encounters:   10/05/22 116/64   07/28/22 98/68   04/05/22 124/80         Constitutional:  Khurram Kenny is oriented to place, person and time ,appears well-developed and well-nourished  HEENT:  Atraumatic and normocephalic, external ears normal bilaterally, nose normal no oropharyngeal exudate and is clear and moist  Eyes:  EOCM normal; conjunctivae normal; PERRLA bilaterally  Neck:  Normal range of motion, neck supple, no JVD and no thyromegaly  Cardiovascular:  RRR, normal heart sounds and intact distal pulses  Pulmonary:  effort normal and breath sounds normal bilaterally,no wheezes or rales, no respiratory distress  Abdominal:  Soft, non-tender; normal bowel sounds, no masses  Musculoskeletal:  Normal range of motion and no edema or tenderness bilaterally  No lymphadenopathy  Neurological:  alert, oriented, and normal reflexes bilaterally  Skin: warm and dry  Psychiatric:  normal mood and effect; behavior normal.    Labs:   Lab Results   Component Value Date    LABA1C 5.6 12/07/2016     Lab Results   Component Value Date    CHOL 141 07/28/2022     Lab Results   Component Value Date    HDL 35 (L) 07/28/2022     Lab Results   Component Value Date    LDLCALC 105 06/05/2017     Lab Results   Component Value Date    TRIG 68 07/28/2022     No results found for: [place], [place]  Lab Results   Component Value Date    WBC 5.7 07/28/2022    HGB 13.9 07/28/2022    HCT 41.7 07/28/2022    MCV 90.7 07/28/2022     07/28/2022     Lab Results   Component Value Date    INR 1.0 04/07/2021    PROTIME 10.0 04/07/2021     Lab Results   Component Value Date    GLUCOSE 90 07/28/2022    CREATININE 1.14 07/28/2022    BUN 21 07/28/2022     07/28/2022    K 3.5 (L) 07/28/2022     07/28/2022    CO2 22 07/28/2022     Lab Results   Component Value Date    ALT 16 07/28/2022    AST 17 07/28/2022    ALKPHOS 62 07/28/2022    BILITOT 0.49 07/28/2022     Lab Results   Component Value Date    LABALBU 4.2 07/28/2022     Lab Results   Component Value Date    TSH 7.28 (H) 07/28/2022     Assessment:   Diagnosis Orders   1. Elevated PSA        2. Essential hypertension        3. Hypothyroidism due to medication        4. High cholesterol        5. Ischemic heart disease        6.  Sore throat              Plan:  Patient had elevated PSA at 5.0 and has seen the urologist and office notes from Dr. Daisy Barrientos reviewed and patient was advised to get an MRI and patient had an MRI done yesterday and results are pending and patient will be following up with urology next month and I explained to the patient about the rationale behind the work-up and patient is on tamsulosin for BPH and this started about 2 weeks back  Patient's TSH is elevated at 7.28 and patient was taking 112 mcg of Synthroid and it was increased to 125 mcg and patient be doing the lab work again today  Blood pressure is stable on current medications  Patient's cholesterol profile is stable and will continue current treatment  Patient's cough and congestion in the throat has not improved with the Protonix and patient states when he was taking the medicine it did not make any difference but now when he stopped the medicine he feels that it is getting worse again so patient is advised to start back on the Protonix and a new prescription was given and take it for 2 months and then call me back  Patient had an episode of atrial fibrillation and patient was admitted in the hospital 2 years back and had a stress test done which shows abnormal study and had possible past infarct and the ejection fraction was 49% and patient was following with a cardiologist and as there was a delay in seeing the patient and no proper follow-up was done and so advised patient to see the cardiologist again and he is going to call them and make an appointment or he will call me if he has any issues  Review in 4 months           1. Ginny Beatty received counseling on the following healthy behaviors: nutrition and exercise    2. Prior labs and health maintenance reviewed. 3.  Discussed use, benefit, and side effects of prescribed medications. Barriers to medication compliance addressed. All his questions were answered. Pt voiced understanding. Ginny Beatty will continue current medications, diet and exercise.               Orders Placed This Encounter   Medications    pantoprazole (PROTONIX) 40 MG tablet     Sig: Take 1 tablet by mouth every morning (before breakfast)     Dispense:  90 tablet     Refill:  0          Completed Refills               Requested Prescriptions     Signed Prescriptions Disp Refills    pantoprazole (PROTONIX) 40 MG tablet 90 tablet 0     Sig: Take 1 tablet by mouth every morning (before breakfast)     4. Patient given educational materials - see patient instructions    5. Was a self-tracking handout given in paper form or via [employer]? NO    No orders of the defined types were placed in this encounter. Return in about 4 months (around 2/5/2023). Patient voiced understanding and agreed to treatment plan. Electronically signed by Rafat Hill MD on 10/5/2022 at 2:26 PM    This note is created with a voice recognition program and while intend to generate a document that accurately reflects the content of the visit, no guarantee can be provided that every mistake has been identified and corrected by editing.

## 2022-10-24 RX ORDER — VALSARTAN AND HYDROCHLOROTHIAZIDE 160; 12.5 MG/1; MG/1
TABLET, FILM COATED ORAL
Qty: 30 TABLET | Refills: 5 | Status: SHIPPED | OUTPATIENT
Start: 2022-10-24

## 2022-10-24 NOTE — TELEPHONE ENCOUNTER
Shanika Chaves is calling to request a refill on the following medication(s):    Medication Request:  Requested Prescriptions     Pending Prescriptions Disp Refills    valsartan-hydroCHLOROthiazide (DIOVAN-HCT) 160-12.5 MG per tablet [Pharmacy Med Name: Valsartan-hydroCHLOROthiazide 160-12.5 MG Oral Tablet (Diovan HCT)] 30 tablet 0     Sig: TAKE ONE TABLET BY MOUTH ONCE DAILY     Sent on 9/26/22 30 no refill, order pending    Last Visit Date (If Applicable):  49/4/9341    Next Visit Date:    2/9/2023

## 2022-11-02 RX ORDER — ATORVASTATIN CALCIUM 20 MG/1
TABLET, FILM COATED ORAL
Qty: 30 TABLET | Refills: 3 | Status: SHIPPED | OUTPATIENT
Start: 2022-11-02

## 2022-11-02 RX ORDER — METOPROLOL SUCCINATE 50 MG/1
TABLET, EXTENDED RELEASE ORAL
Qty: 30 TABLET | Refills: 3 | Status: SHIPPED | OUTPATIENT
Start: 2022-11-02

## 2022-11-22 RX ORDER — MONTELUKAST SODIUM 10 MG/1
TABLET ORAL
Qty: 30 TABLET | Refills: 2 | Status: SHIPPED | OUTPATIENT
Start: 2022-11-22

## 2022-11-22 NOTE — TELEPHONE ENCOUNTER
Diane Celeste is calling to request a refill on the following medication(s):    Medication Request:  Requested Prescriptions     Pending Prescriptions Disp Refills    montelukast (SINGULAIR) 10 MG tablet [Pharmacy Med Name: Montelukast Sodium 10 MG Oral Tablet (Singulair)] 30 tablet 3     Sig: TAKE ONE TABLET BY MOUTH ONCE DAILY IN THE MORNING       Last Visit Date (If Applicable):  34/2/6166    Next Visit Date:    2/9/2023

## 2022-12-07 ENCOUNTER — HOSPITAL ENCOUNTER (OUTPATIENT)
Dept: ULTRASOUND IMAGING | Age: 67
Setting detail: OUTPATIENT SURGERY
Discharge: HOME | End: 2022-12-09
Attending: UROLOGY
Payer: COMMERCIAL

## 2022-12-07 ENCOUNTER — ANESTHESIA (OUTPATIENT)
Dept: OPERATING ROOM | Age: 67
End: 2022-12-07
Payer: COMMERCIAL

## 2022-12-07 ENCOUNTER — ANESTHESIA EVENT (OUTPATIENT)
Dept: OPERATING ROOM | Age: 67
End: 2022-12-07
Payer: COMMERCIAL

## 2022-12-07 ENCOUNTER — HOSPITAL ENCOUNTER (OUTPATIENT)
Age: 67
Setting detail: OUTPATIENT SURGERY
Discharge: HOME | End: 2022-12-07
Attending: UROLOGY | Admitting: UROLOGY
Payer: COMMERCIAL

## 2022-12-07 VITALS
BODY MASS INDEX: 27.98 KG/M2 | DIASTOLIC BLOOD PRESSURE: 83 MMHG | RESPIRATION RATE: 20 BRPM | OXYGEN SATURATION: 99 % | SYSTOLIC BLOOD PRESSURE: 139 MMHG | HEART RATE: 57 BPM | TEMPERATURE: 97.2 F | HEIGHT: 75 IN | WEIGHT: 225 LBS

## 2022-12-07 DIAGNOSIS — R97.20 ELEVATED PSA: ICD-10-CM

## 2022-12-07 PROCEDURE — 6360000002 HC RX W HCPCS

## 2022-12-07 PROCEDURE — 6360000002 HC RX W HCPCS: Performed by: NURSE ANESTHETIST, CERTIFIED REGISTERED

## 2022-12-07 PROCEDURE — 3700000000 HC ANESTHESIA ATTENDED CARE: Performed by: UROLOGY

## 2022-12-07 PROCEDURE — 2500000003 HC RX 250 WO HCPCS: Performed by: NURSE ANESTHETIST, CERTIFIED REGISTERED

## 2022-12-07 PROCEDURE — 3700000001 HC ADD 15 MINUTES (ANESTHESIA): Performed by: UROLOGY

## 2022-12-07 PROCEDURE — 2709999900 HC NON-CHARGEABLE SUPPLY: Performed by: UROLOGY

## 2022-12-07 PROCEDURE — 7100000040 HC SPAR PHASE II RECOVERY - FIRST 15 MIN: Performed by: UROLOGY

## 2022-12-07 PROCEDURE — 76942 ECHO GUIDE FOR BIOPSY: CPT

## 2022-12-07 PROCEDURE — 2580000003 HC RX 258: Performed by: UROLOGY

## 2022-12-07 PROCEDURE — 2500000003 HC RX 250 WO HCPCS: Performed by: UROLOGY

## 2022-12-07 PROCEDURE — 88305 TISSUE EXAM BY PATHOLOGIST: CPT

## 2022-12-07 PROCEDURE — 7100000041 HC SPAR PHASE II RECOVERY - ADDTL 15 MIN: Performed by: UROLOGY

## 2022-12-07 PROCEDURE — 3600000012 HC SURGERY LEVEL 2 ADDTL 15MIN: Performed by: UROLOGY

## 2022-12-07 PROCEDURE — C1725 CATH, TRANSLUMIN NON-LASER: HCPCS | Performed by: UROLOGY

## 2022-12-07 PROCEDURE — 3600000002 HC SURGERY LEVEL 2 BASE: Performed by: UROLOGY

## 2022-12-07 RX ORDER — FENTANYL CITRATE 50 UG/ML
INJECTION, SOLUTION INTRAMUSCULAR; INTRAVENOUS PRN
Status: DISCONTINUED | OUTPATIENT
Start: 2022-12-07 | End: 2022-12-07 | Stop reason: SDUPTHER

## 2022-12-07 RX ORDER — CIPROFLOXACIN 500 MG/1
500 TABLET, FILM COATED ORAL 2 TIMES DAILY
COMMUNITY

## 2022-12-07 RX ORDER — SODIUM CHLORIDE, SODIUM LACTATE, POTASSIUM CHLORIDE, CALCIUM CHLORIDE 600; 310; 30; 20 MG/100ML; MG/100ML; MG/100ML; MG/100ML
INJECTION, SOLUTION INTRAVENOUS CONTINUOUS
Status: DISCONTINUED | OUTPATIENT
Start: 2022-12-07 | End: 2022-12-07 | Stop reason: HOSPADM

## 2022-12-07 RX ORDER — SODIUM CHLORIDE 9 MG/ML
INJECTION, SOLUTION INTRAVENOUS PRN
Status: DISCONTINUED | OUTPATIENT
Start: 2022-12-07 | End: 2022-12-07 | Stop reason: HOSPADM

## 2022-12-07 RX ORDER — SODIUM CHLORIDE 0.9 % (FLUSH) 0.9 %
5-40 SYRINGE (ML) INJECTION PRN
Status: DISCONTINUED | OUTPATIENT
Start: 2022-12-07 | End: 2022-12-07 | Stop reason: HOSPADM

## 2022-12-07 RX ORDER — FENTANYL CITRATE 50 UG/ML
25 INJECTION, SOLUTION INTRAMUSCULAR; INTRAVENOUS EVERY 5 MIN PRN
Status: DISCONTINUED | OUTPATIENT
Start: 2022-12-07 | End: 2022-12-07 | Stop reason: HOSPADM

## 2022-12-07 RX ORDER — SODIUM CHLORIDE 0.9 % (FLUSH) 0.9 %
5-40 SYRINGE (ML) INJECTION EVERY 12 HOURS SCHEDULED
Status: DISCONTINUED | OUTPATIENT
Start: 2022-12-07 | End: 2022-12-07 | Stop reason: HOSPADM

## 2022-12-07 RX ORDER — FENTANYL CITRATE 50 UG/ML
INJECTION, SOLUTION INTRAMUSCULAR; INTRAVENOUS
Status: COMPLETED
Start: 2022-12-07 | End: 2022-12-07

## 2022-12-07 RX ORDER — LIDOCAINE HYDROCHLORIDE 10 MG/ML
INJECTION, SOLUTION EPIDURAL; INFILTRATION; INTRACAUDAL; PERINEURAL PRN
Status: DISCONTINUED | OUTPATIENT
Start: 2022-12-07 | End: 2022-12-07 | Stop reason: ALTCHOICE

## 2022-12-07 RX ORDER — LIDOCAINE HYDROCHLORIDE 10 MG/ML
INJECTION, SOLUTION EPIDURAL; INFILTRATION; INTRACAUDAL; PERINEURAL PRN
Status: DISCONTINUED | OUTPATIENT
Start: 2022-12-07 | End: 2022-12-07 | Stop reason: SDUPTHER

## 2022-12-07 RX ORDER — PROPOFOL 10 MG/ML
INJECTION, EMULSION INTRAVENOUS CONTINUOUS PRN
Status: DISCONTINUED | OUTPATIENT
Start: 2022-12-07 | End: 2022-12-07 | Stop reason: SDUPTHER

## 2022-12-07 RX ADMIN — FENTANYL CITRATE 100 MCG: 50 INJECTION, SOLUTION INTRAMUSCULAR; INTRAVENOUS at 12:18

## 2022-12-07 RX ADMIN — PROPOFOL 30 MG: 10 INJECTION, EMULSION INTRAVENOUS at 12:23

## 2022-12-07 RX ADMIN — PROPOFOL 100 MCG/KG/MIN: 10 INJECTION, EMULSION INTRAVENOUS at 12:18

## 2022-12-07 RX ADMIN — FENTANYL CITRATE 25 MCG: 50 INJECTION INTRAMUSCULAR; INTRAVENOUS at 13:19

## 2022-12-07 RX ADMIN — PROPOFOL 30 MG: 10 INJECTION, EMULSION INTRAVENOUS at 12:20

## 2022-12-07 RX ADMIN — SODIUM CHLORIDE, POTASSIUM CHLORIDE, SODIUM LACTATE AND CALCIUM CHLORIDE: 600; 310; 30; 20 INJECTION, SOLUTION INTRAVENOUS at 11:33

## 2022-12-07 RX ADMIN — FENTANYL CITRATE 25 MCG: 50 INJECTION, SOLUTION INTRAMUSCULAR; INTRAVENOUS at 13:19

## 2022-12-07 RX ADMIN — Medication 2000 MG: at 12:26

## 2022-12-07 RX ADMIN — LIDOCAINE HYDROCHLORIDE 50 MG: 10 INJECTION, SOLUTION EPIDURAL; INFILTRATION; INTRACAUDAL; PERINEURAL at 12:18

## 2022-12-07 ASSESSMENT — PAIN DESCRIPTION - PAIN TYPE: TYPE: SURGICAL PAIN

## 2022-12-07 ASSESSMENT — PAIN SCALES - GENERAL
PAINLEVEL_OUTOF10: 2
PAINLEVEL_OUTOF10: 3
PAINLEVEL_OUTOF10: 6
PAINLEVEL_OUTOF10: 2

## 2022-12-07 ASSESSMENT — PAIN DESCRIPTION - LOCATION
LOCATION: RECTUM
LOCATION: RECTUM

## 2022-12-07 ASSESSMENT — PAIN - FUNCTIONAL ASSESSMENT: PAIN_FUNCTIONAL_ASSESSMENT: 0-10

## 2022-12-07 ASSESSMENT — PAIN DESCRIPTION - DESCRIPTORS
DESCRIPTORS: DISCOMFORT
DESCRIPTORS: SHARP;STABBING

## 2022-12-07 NOTE — PROGRESS NOTES
Discharge instructions reviewed with pt and spouse; both verbalize understanding. Pt d/c to home with all belongings.

## 2022-12-07 NOTE — DISCHARGE INSTRUCTIONS
Prostate Biopsy Discharge Instructions:    Complete entire course of antibiotics as prescribed. Take prescriptions as directed. No driving while taking narcotic pain medication. Hold blood thinners after biopsy. May resume anticoagulation on 12/12/2022. Please call attending physician or hospital  with questions. Please call or present to ED for fever >101 F, shaking, chills, intractable nausea and vomiting, or uncontrolled pain. OK to shower after discharge. You may see blood in your urine, stools, and semen for up to 6 weeks. This is normal.   Follow up with Dr. Kalpana Diggs in 1-2 weeks to discuss biopsy results. Call office to confirm appointment. .  No alcoholic beverages, no driving or operating machinery, no making important decisions for 24 hours. You may have a normal diet but should eat lightly day of surgery. Drink plenty of fluids.   Urinate within 8 hours after surgery, if unable to urinate call your doctor

## 2022-12-07 NOTE — ANESTHESIA POSTPROCEDURE EVALUATION
Department of Anesthesiology  Postprocedure Note    Patient: Chris Beaver  MRN: [id number]  YOB: 1955  Date of evaluation: 12/7/2022      Procedure Summary     Date: 12/07/22 Room / Location: [facility] 01 / [facility] hospitals    Anesthesia Start: 1211 Anesthesia Stop: 5611    Procedure: FUSION PROSTATE BIOPSY, ULTRASOUND Diagnosis:       Elevated PSA      (ELEVATED PSA)    Surgeons: Geronimo Cheney MD Responsible Provider: Jensen Swanson MD    Anesthesia Type: MAC ASA Status: 3          Anesthesia Type: No value filed.     Arias Phase I:      Arias Phase II:        Anesthesia Post Evaluation    Patient location during evaluation: bedside  Patient participation: complete - patient participated  Level of consciousness: awake  Airway patency: patent  Nausea & Vomiting: no nausea and no vomiting  Complications: no  Cardiovascular status: hemodynamically stable  Respiratory status: acceptable  Hydration status: stable  Comments: /73   Pulse 62   Temp 96.8 °F (36 °C) (Temporal)   Resp 16   Ht 6' 3\" (1.905 m)   Wt 225 lb (102.1 kg)   SpO2 100%   BMI 28.12 kg/m²

## 2022-12-07 NOTE — ANESTHESIA PRE PROCEDURE
Department of Anesthesiology  Preprocedure Note       Name:  Delight Krabbe   Age:  79 y.o.  :  1955                                          MRN:  [id number]         Date:  2022      Surgeon: Edmundo Lindo):  Rach Murguia MD    Procedure: Procedure(s):  FUSION PROSTATE BIOPSY, ULTRASOUND    Medications prior to admission:   Prior to Admission medications    Medication Sig Start Date End Date Taking? Authorizing Provider   ciprofloxacin (CIPRO) 500 MG tablet Take 500 mg by mouth 2 times daily   Yes Historical Provider, MD   montelukast (SINGULAIR) 10 MG tablet TAKE ONE TABLET BY MOUTH ONCE DAILY IN THE MORNING 22   Franklin Mart MD   metoprolol succinate (TOPROL XL) 50 MG extended release tablet TAKE ONE TABLET BY MOUTH ONCE DAILY 22   Franklin Mart MD   apixaban (ELIQUIS) 5 MG TABS tablet TAKE ONE TABLET BY MOUTH TWICE A DAY 22   Franklin Mart MD   atorvastatin (LIPITOR) 20 MG tablet TAKE ONE TABLET BY MOUTH ONCE DAILY 22   Franklin Mart MD   valsartan-hydroCHLOROthiazide (DIOVAN-HCT) 160-12.5 MG per tablet TAKE ONE TABLET BY MOUTH ONCE DAILY 10/24/22   Franklin Mart MD   tamsulosin (FLOMAX) 0.4 MG capsule Take 0.4 mg by mouth daily 22   Historical Provider, MD   pantoprazole (PROTONIX) 40 MG tablet Take 1 tablet by mouth every morning (before breakfast) 10/5/22   Franklin Mart MD   levothyroxine (SYNTHROID) 125 MCG tablet Take 1 tablet by mouth in the morning.  8/10/22   Franklin Mart MD   Ascorbic Acid (VITAMIN C) 250 MG tablet Take 1,000 mg by mouth daily    Historical Provider, MD       Current medications:    Current Facility-Administered Medications   Medication Dose Route Frequency Provider Last Rate Last Admin    ceFAZolin (ANCEF) 2000 mg in sterile water 20 mL IV syringe  2,000 mg IntraVENous On Call to [address] Eyad Greenwood MD        lactated ringers infusion   IntraVENous Continuous Rach Murguia MD           Allergies:  No Known Allergies    Problem List:    Patient Active Problem List   Diagnosis Code    Hyperlipidemia E78.5    Hypertension I10    Hypothyroidism E03.9    Epistaxis R04.0    Nevus D22.9    Basal cell carcinoma of nose C44.311    Hypogonadism in male E29.1    Localized edema R60.0    Atrial fibrillation with RVR (HCC) I48.91    New onset a-fib (HCC) I48.91    Alcohol use Z78.9    Acute renal insufficiency N28.9    Azotemia R79.89    Elevated brain natriuretic peptide (BNP) level R79.89    Low HDL (under 40) E78.6    Left ventricular systolic dysfunction K90.6       Past Medical History:        Diagnosis Date    Alcohol use     Hyperlipidemia     Hypertension     Thyroid disease     Wears contact lenses     Wears glasses     Wellness examination     PCP Areli Miller MD / Zachery Avendaño / last visit 10/5/22       Past Surgical History:        Procedure Laterality Date    APPENDECTOMY      CARDIOVASCULAR STRESS TEST      10-15 yrs ago    HERNIA REPAIR         Social History:    Social History     Tobacco Use    Smoking status: Former     Packs/day: 1.00     Years: 13.00     Pack years: 13.00     Types: Cigarettes     Quit date: 1981     Years since quittin.9    Smokeless tobacco: Never   Substance Use Topics    Alcohol use: Yes     Comment: moderate                                Counseling given: Not Answered      Vital Signs (Current):   Vitals:    22 1145   Weight: 225 lb (102.1 kg)   Height: 6' 3\" (1.905 m)                                              BP Readings from Last 3 Encounters:   10/05/22 116/64   22 98/68   22 124/80       NPO Status: Time of last liquid consumption:                         Time of last solid consumption:                         Date of last liquid consumption: 22                        Date of last solid food consumption: 22    BMI:   Wt Readings from Last 3 Encounters:   22 225 lb (102.1 kg)   10/05/22 228 lb 9.6 oz (103.7 kg)   07/28/22 233 lb (105.7 kg)     Body mass index is 28.12 kg/m². CBC:   Lab Results   Component Value Date/Time    WBC 5.7 07/28/2022 01:05 PM    RBC 4.60 07/28/2022 01:05 PM    HGB 13.9 07/28/2022 01:05 PM    HCT 41.7 07/28/2022 01:05 PM    MCV 90.7 07/28/2022 01:05 PM    RDW 13.0 07/28/2022 01:05 PM     07/28/2022 01:05 PM       CMP:   Lab Results   Component Value Date/Time     07/28/2022 01:05 PM    K 3.5 07/28/2022 01:05 PM     07/28/2022 01:05 PM    CO2 22 07/28/2022 01:05 PM    BUN 21 07/28/2022 01:05 PM    CREATININE 1.14 07/28/2022 01:05 PM    GFRAA >60 07/28/2022 01:05 PM    LABGLOM >60 07/28/2022 01:05 PM    GLUCOSE 90 07/28/2022 01:05 PM    PROT 6.6 07/28/2022 01:05 PM    CALCIUM 9.3 07/28/2022 01:05 PM    BILITOT 0.49 07/28/2022 01:05 PM    ALKPHOS 62 07/28/2022 01:05 PM    AST 17 07/28/2022 01:05 PM    ALT 16 07/28/2022 01:05 PM       POC Tests: No results for input(s): POCGLU, POCNA, POCK, POCCL, POCBUN, POCHEMO, POCHCT in the last 72 hours.     Coags:   Lab Results   Component Value Date/Time    PROTIME 10.0 04/07/2021 05:34 AM    INR 1.0 04/07/2021 05:34 AM    APTT 55.0 04/08/2021 05:49 AM       HCG (If Applicable): No results found for: PREGTESTUR, PREGSERUM, HCG, HCGQUANT     ABGs: No results found for: PHART, PO2ART, HCU3RBV, CQX0HNF, BEART, E0ALGEAD     Type & Screen (If Applicable):  No results found for: LABABO, LABRH    Drug/Infectious Status (If Applicable):  No results found for: HIV, HEPCAB    COVID-19 Screening (If Applicable):   Lab Results   Component Value Date/Time    COVID19 Not Detected 04/06/2021 10:32 AM           Anesthesia Evaluation  Patient summary reviewed no history of anesthetic complications:   Airway: Mallampati: II  TM distance: >3 FB   Neck ROM: full  Mouth opening: > = 3 FB   Dental:    (+) poor dentition      Pulmonary:Negative Pulmonary ROS and normal exam                               Cardiovascular:    (+) hypertension:, dysrhythmias: atrial fibrillation,       ECG reviewed      Echocardiogram reviewed                  Neuro/Psych:   Negative Neuro/Psych ROS              GI/Hepatic/Renal:             Endo/Other:    (+) hypothyroidism, blood dyscrasia: anticoagulation therapy:., .                 Abdominal:             Vascular: negative vascular ROS. Other Findings:           Anesthesia Plan      MAC     ASA 3             Anesthetic plan and risks discussed with patient. Use of blood products discussed with patient whom consented to blood products. Plan discussed with CRNA.                     Axel Shah MD   12/7/2022

## 2022-12-07 NOTE — H&P
Loretta Cruz, Lilliana Workman, Zhang Robledo, Kathy Leon, & Will   Urology History & Physical      Patient:  Loulou Barksdale  MRN: [id number]  YOB: 1955    HISTORY OF PRESENT ILLNESS:   The patient is a 79 y.o. male who presents with elevated PSA. On Eliquis 5 mg daily. Patient's old records, notes and chart reviewed and summarized above. Past Medical History:    Past Medical History:   Diagnosis Date    Alcohol use     Hyperlipidemia     Hypertension     Thyroid disease     Wears contact lenses     Wears glasses     Wellness examination     PCP Kyra Coe MD / [facility] / last visit 10/5/22       Past Surgical History:    Past Surgical History:   Procedure Laterality Date    APPENDECTOMY      CARDIOVASCULAR STRESS TEST      10-15 yrs ago    HERNIA REPAIR         Medications:    No current facility-administered medications for this encounter.     Current Outpatient Medications:     montelukast (SINGULAIR) 10 MG tablet, TAKE ONE TABLET BY MOUTH ONCE DAILY IN THE MORNING, Disp: 30 tablet, Rfl: 2    metoprolol succinate (TOPROL XL) 50 MG extended release tablet, TAKE ONE TABLET BY MOUTH ONCE DAILY, Disp: 30 tablet, Rfl: 3    apixaban (ELIQUIS) 5 MG TABS tablet, TAKE ONE TABLET BY MOUTH TWICE A DAY, Disp: 60 tablet, Rfl: 3    atorvastatin (LIPITOR) 20 MG tablet, TAKE ONE TABLET BY MOUTH ONCE DAILY, Disp: 30 tablet, Rfl: 3    valsartan-hydroCHLOROthiazide (DIOVAN-HCT) 160-12.5 MG per tablet, TAKE ONE TABLET BY MOUTH ONCE DAILY, Disp: 30 tablet, Rfl: 5    tamsulosin (FLOMAX) 0.4 MG capsule, Take 0.4 mg by mouth daily, Disp: , Rfl:     pantoprazole (PROTONIX) 40 MG tablet, Take 1 tablet by mouth every morning (before breakfast), Disp: 90 tablet, Rfl: 0    levothyroxine (SYNTHROID) 125 MCG tablet, Take 1 tablet by mouth in the morning., Disp: 30 tablet, Rfl: 3    Ascorbic Acid (VITAMIN C) 250 MG tablet, Take 1,000 mg by mouth daily, Disp: , Rfl:     Allergies:  No Known Allergies    Social History: Social History     Socioeconomic History    Marital status: Single     Spouse name: Not on file    Number of children: Not on file    Years of education: Not on file    Highest education level: Not on file   Occupational History    Not on file   Tobacco Use    Smoking status: Former     Packs/day: 1.00     Years: 13.00     Pack years: 13.00     Types: Cigarettes     Quit date: 1981     Years since quittin.9    Smokeless tobacco: Never   Vaping Use    Vaping Use: Never used   Substance and Sexual Activity    Alcohol use: Yes     Comment: moderate    Drug use: No    Sexual activity: Not on file   Other Topics Concern    Not on file   Social History Narrative    Not on file     Social Determinants of Health     Financial Resource Strain: Low Risk     Difficulty of Paying Living Expenses: Not hard at all   Food Insecurity: No Food Insecurity    Worried About Running Out of Food in the Last Year: Never true    Ran Out of Food in the Last Year: Never true   Transportation Needs: Not on file   Physical Activity: Not on file   Stress: Not on file   Social Connections: Not on file   Intimate Partner Violence: Not on file   Housing Stability: Not on file       Family History:    Family History   Problem Relation Age of Onset    Heart Disease Mother         OHS/ afib    COPD Father         lung disease    Alcohol Abuse Father        REVIEW OF SYSTEMS:  A comprehensive 14 point review of systems was obtained. Constitutional: No fatigue  Eyes: No blurry vision  Ears, nose, mouth, throat, face: No ringing in the ears; no facial droop. Respiratory: No cough or cold. Cardiovascular: No palpitations  Gastrointestinal: No diarrhea or constipation. Genitourinary: No burning with urination  Integument/Skin: No rashes  Hematologic/Lymphatic: No easy bruising  Musculoskeletal: No muscle pains  Neurologic: No weakness in the extremities. Psychiatric: No depression or suicidal thoughts.   Endocrine: No heat or cold intolerances. Allergic/Immunologic: No current seasonal allergies; no skin hives. Physical Exam:      Constitutional: Patient in no acute distress. Neuro: alert and oriented to person place and time. Head: Atraumatic and normocephalic. Neck: Trachea midline. Ext: 2+ radial pulses bilaterally. Psych: Mood and affect normal.  Skin: No rashes or bruising present. Lungs: Respiratory effort normal.  Cardiovascular:  Regular rhythm. Abdomen: Soft, non-tender, non-distended. Bladder non-tender and not distended. Lymphatics: no palpable lymphadenopathy    Labs:  No results for input(s): WBC, HGB, HCT, MCV, PLT in the last 72 hours. No results for input(s): NA, K, CL, CO2, PHOS, BUN, CREATININE, CA in the last 72 hours. No results for input(s): COLORU, PHUR, LABCAST, WBCUA, RBCUA, MUCUS, TRICHOMONAS, YEAST, BACTERIA, CLARITYU, SPECGRAV, LEUKOCYTESUR, UROBILINOGEN, [place] in the last 72 hours. Invalid input(s): NITRATE, GLUCOSEUKETONESUAMORPHOUS        -----------------------------------------------------------------  Imaging Results:  No results found.     Assessment and Plan   Impression:    78 yo M  Elevated PSA        Plan:   Ultrasound guided transrectal prostate biopsy    Nettie Dolan MD

## 2022-12-07 NOTE — OP NOTE
Loretta Ramirez, Brendan Garrison, Rhiannon Hill, Leonel Schilling, [address] [facility], & Will    FACILITY:  [place], [place]  DATE:  12/7/22  Maritza Kendall       Surgeon: Silvina Ramirez MD  Assistant: Farzana Feng MD PGY3  Preoperative diagnosis: Elevated prostatic specific antigen. Postoperative diagnosis: Elevated prostatic specific antigen. Procedure: Transrectal ultrasound of the prostate with saturation prostate biopsy. Anesthesia: MAC and local  Specimen: Prostate biopsy specimens  COMPLICATIONS:  None. ESTIMATED BLOOD LOSS: Minimal   Drains: None  Follow-up: In 1-2 weeks with Silvina Ramirez MD to discuss pathology     INDICATIONS:  Pt is a 80 yo M who has history of elevated PSA. He was found to have a R mid gland lesion in MRI. He is here today for MRI ultrasound fusion biopsy,  the risks, benefits and alternatives were explained and informed consent was signed. OPERATIVE NARRATIVE:  The patient was brought to the operating room. He was properly identified. The procedure was confirmed verbally. The patient was administered a monitored anesthetic. He was placed in the lateral decubitus position. The ultrasound probe was placed into the rectum and prostatography ensued. The [employer] workstation was coupled to the ultrasound probe and using the device, a series of ultrasonic images of the prostate, seminal vesicles and base of the bladder were obtained. These images were then subsequently reconstructed in 3D space using the [employer].. The MRI images were imported to this workstation and subsequent fusion of ultrasound and MRI images were performed. MR/ultrasound fusion prostate model planning and reconstruction then occurred and there was one region of interest as noted in MRI. Once this was completed, we obtained 6 core biopsies from the region of interest which was marked as R mid gland. Subsequently we also performed systematic sextant biopsy involving 24 (2 from each area) cores. The prostate was 87 gm.  The procedure was then  terminated. The patient tolerated the procedure well. The plan is for the patient to be discharged home. He has scripts for antibiotics. He will follow up with as an outpatient to go over his path report. Dr Lissett Yanez present throughout case.

## 2022-12-12 LAB — SURGICAL PATHOLOGY REPORT: NORMAL

## 2022-12-18 DIAGNOSIS — E03.9 ACQUIRED HYPOTHYROIDISM: ICD-10-CM

## 2022-12-18 DIAGNOSIS — R97.20 ELEVATED PSA: ICD-10-CM

## 2022-12-19 NOTE — TELEPHONE ENCOUNTER
Lourdes Stewart is calling to request a refill on the following medication(s):    Medication Request:  Requested Prescriptions     Pending Prescriptions Disp Refills    levothyroxine (SYNTHROID) 125 MCG tablet [Pharmacy Med Name: Levothyroxine Sodium 125 MCG Oral Tablet (Euthyrox)] 30 tablet 3     Sig: TAKE 1 TABLET BY MOUTH EVERY MORNING       Last Visit Date (If Applicable):  19/1/0935    Next Visit Date:    2/9/2023

## 2022-12-21 RX ORDER — LEVOTHYROXINE SODIUM 0.12 MG/1
TABLET ORAL
Qty: 30 TABLET | Refills: 3 | Status: SHIPPED | OUTPATIENT
Start: 2022-12-21

## 2023-01-18 ENCOUNTER — TELEPHONE (OUTPATIENT)
Dept: INTERNAL MEDICINE CLINIC | Age: 68
End: 2023-01-18

## 2023-01-18 NOTE — TELEPHONE ENCOUNTER
Patient insurance is changing, he is currently on eliquis. Insurance states they are no longer going to pay for that due to it not having a generic    He is asking if you feel that there is something else for him to take? Doesn't need it now just wants to know what his options are?     Please advise

## 2023-01-31 RX ORDER — ATORVASTATIN CALCIUM 20 MG/1
TABLET, FILM COATED ORAL
Qty: 30 TABLET | Refills: 0 | Status: SHIPPED | OUTPATIENT
Start: 2023-01-31

## 2023-02-16 ENCOUNTER — OFFICE VISIT (OUTPATIENT)
Dept: INTERNAL MEDICINE CLINIC | Age: 68
End: 2023-02-16
Payer: COMMERCIAL

## 2023-02-16 VITALS
HEIGHT: 75 IN | WEIGHT: 235 LBS | BODY MASS INDEX: 29.22 KG/M2 | SYSTOLIC BLOOD PRESSURE: 142 MMHG | TEMPERATURE: 97.7 F | HEART RATE: 61 BPM | OXYGEN SATURATION: 98 % | RESPIRATION RATE: 15 BRPM | DIASTOLIC BLOOD PRESSURE: 88 MMHG

## 2023-02-16 DIAGNOSIS — E03.2 HYPOTHYROIDISM DUE TO MEDICATION: ICD-10-CM

## 2023-02-16 DIAGNOSIS — I48.0 PAROXYSMAL ATRIAL FIBRILLATION (HCC): ICD-10-CM

## 2023-02-16 DIAGNOSIS — R97.20 ELEVATED PSA: ICD-10-CM

## 2023-02-16 DIAGNOSIS — I10 ESSENTIAL HYPERTENSION: Primary | ICD-10-CM

## 2023-02-16 PROCEDURE — 3079F DIAST BP 80-89 MM HG: CPT | Performed by: INTERNAL MEDICINE

## 2023-02-16 PROCEDURE — 1123F ACP DISCUSS/DSCN MKR DOCD: CPT | Performed by: INTERNAL MEDICINE

## 2023-02-16 PROCEDURE — 99214 OFFICE O/P EST MOD 30 MIN: CPT | Performed by: INTERNAL MEDICINE

## 2023-02-16 PROCEDURE — 3077F SYST BP >= 140 MM HG: CPT | Performed by: INTERNAL MEDICINE

## 2023-02-16 RX ORDER — MONTELUKAST SODIUM 10 MG/1
TABLET ORAL
Qty: 30 TABLET | Refills: 0 | Status: SHIPPED | OUTPATIENT
Start: 2023-02-16

## 2023-02-16 SDOH — ECONOMIC STABILITY: INCOME INSECURITY: HOW HARD IS IT FOR YOU TO PAY FOR THE VERY BASICS LIKE FOOD, HOUSING, MEDICAL CARE, AND HEATING?: NOT HARD AT ALL

## 2023-02-16 SDOH — ECONOMIC STABILITY: FOOD INSECURITY: WITHIN THE PAST 12 MONTHS, YOU WORRIED THAT YOUR FOOD WOULD RUN OUT BEFORE YOU GOT MONEY TO BUY MORE.: NEVER TRUE

## 2023-02-16 SDOH — ECONOMIC STABILITY: FOOD INSECURITY: WITHIN THE PAST 12 MONTHS, THE FOOD YOU BOUGHT JUST DIDN'T LAST AND YOU DIDN'T HAVE MONEY TO GET MORE.: NEVER TRUE

## 2023-02-16 SDOH — ECONOMIC STABILITY: HOUSING INSECURITY
IN THE LAST 12 MONTHS, WAS THERE A TIME WHEN YOU DID NOT HAVE A STEADY PLACE TO SLEEP OR SLEPT IN A SHELTER (INCLUDING NOW)?: NO

## 2023-02-16 ASSESSMENT — PATIENT HEALTH QUESTIONNAIRE - PHQ9
1. LITTLE INTEREST OR PLEASURE IN DOING THINGS: 0
SUM OF ALL RESPONSES TO PHQ QUESTIONS 1-9: 0
SUM OF ALL RESPONSES TO PHQ9 QUESTIONS 1 & 2: 0
SUM OF ALL RESPONSES TO PHQ QUESTIONS 1-9: 0
2. FEELING DOWN, DEPRESSED OR HOPELESS: 0
SUM OF ALL RESPONSES TO PHQ QUESTIONS 1-9: 0
SUM OF ALL RESPONSES TO PHQ QUESTIONS 1-9: 0

## 2023-02-16 NOTE — PROGRESS NOTES
Yuridia Garcia is a 79 y.o. male who presents for   Chief Complaint   Patient presents with    Hypertension     4 month follow up    [address], flu, covid, hep c, shingles, colo,pneumo    Discuss Medications     Patient states Eliquis is too much would like to discuss an alternative    and follow up of chronic medical problems. Patient Active Problem List   Diagnosis    Hyperlipidemia    Hypertension    Hypothyroidism    Epistaxis    Nevus    Basal cell carcinoma of nose    Hypogonadism in male    Localized edema    Atrial fibrillation with RVR (HCC)    New onset a-fib (HCC)    Alcohol use    Acute renal insufficiency    Azotemia    Elevated brain natriuretic peptide (BNP) level    Low HDL (under 40)    Left ventricular systolic dysfunction     HPI  Here for follow-up  on blood pressure and wants to discuss about Eliquis and his atrial fibrillation    Current Outpatient Medications   Medication Sig Dispense Refill    atorvastatin (LIPITOR) 20 MG tablet TAKE 1 TABLET EVERY DAY 30 tablet 0    levothyroxine (SYNTHROID) 125 MCG tablet TAKE 1 TABLET BY MOUTH EVERY MORNING 30 tablet 3    apixaban (ELIQUIS) 5 MG TABS tablet HOLD FOR 5 DAYS AFTER PROCEDURE (Patient taking differently: 2 times daily) 60 tablet 3    montelukast (SINGULAIR) 10 MG tablet TAKE ONE TABLET BY MOUTH ONCE DAILY IN THE MORNING 30 tablet 2    metoprolol succinate (TOPROL XL) 50 MG extended release tablet TAKE ONE TABLET BY MOUTH ONCE DAILY 30 tablet 3    valsartan-hydroCHLOROthiazide (DIOVAN-HCT) 160-12.5 MG per tablet TAKE ONE TABLET BY MOUTH ONCE DAILY 30 tablet 5    tamsulosin (FLOMAX) 0.4 MG capsule Take 0.4 mg by mouth daily      pantoprazole (PROTONIX) 40 MG tablet Take 1 tablet by mouth every morning (before breakfast) 90 tablet 0    Ascorbic Acid (VITAMIN C) 250 MG tablet Take 1,000 mg by mouth daily       No current facility-administered medications for this visit.        No Known Allergies    Past Medical History:   Diagnosis Date    Alcohol use     Hyperlipidemia     Hypertension     Thyroid disease     Wears contact lenses     Wears glasses     Wellness examination     PCP Keshav Jung MD / [place] / last visit 10/5/22       Past Surgical History:   Procedure Laterality Date    APPENDECTOMY      CARDIOVASCULAR STRESS TEST      10-15 yrs ago    [address] [place] [address] [address] 12/7/2022    FUSION PROSTATE BIOPSY, ULTRASOUND performed by Diego Mcmullen MD at [place] History   Problem Relation Age of Onset    Heart Disease Mother         OHS/ afib    COPD Father         lung disease    Alcohol Abuse Father      ROS  Constitutional:  Negative for fatigue, loss of appetite and unexpected weight change  HEENT            : Negative for neck stiffness and pain, no congestion or sinus pressure  Eyes                : No visual disturbance or pain  Cardiovascular: No chest pain or palpitations or leg swelling  Respiratory      : Negative for cough, shortness of breath or wheezing  Gastrointestinal: Negative for abdominal pain, constipation or diarrhea and bloating No nausea or vomiting  Genitourinary:     No urgency or frequency, no burning or hematuria  Musculoskeletal: No arthralgias, back pain or myalgias  Skin                  : Negative for rash or erythema  Neurological    : Negative for dizziness, weakness, tremors ,light headedness or syncope  Psychiatric       : Negative for dysphoric mood, sleep disturbances, nervous or anxious, or decreased concentration  All other review of systems was negative    Objective  Physical Examination:    Nursing note reviewed    BP (!) 142/88 (Site: Right Upper Arm, Position: Sitting, Cuff Size: Medium Adult)   Pulse 61   Temp 97.7 °F (36.5 °C) (Temporal)   Resp 15   Ht 6' 3\" (1.905 m)   Wt 235 lb (106.6 kg)   SpO2 98%   BMI 29.37 kg/m²   BP Readings from Last 3 Encounters:   02/16/23 (!) 142/88   12/07/22 139/83   10/05/22 116/64         Constitutional: Vicky Black is oriented to place, person and time ,appears well-developed and well-nourished  HEENT:  Atraumatic and normocephalic, external ears normal bilaterally, nose normal no oropharyngeal exudate and is clear and moist  Eyes:  EOCM normal; conjunctivae normal; PERRLA bilaterally  Neck:  Normal range of motion, neck supple, no JVD and no thyromegaly  Cardiovascular:  RRR, normal heart sounds and intact distal pulses  Pulmonary:  effort normal and breath sounds normal bilaterally,no wheezes or rales, no respiratory distress  Abdominal:  Soft, non-tender; normal bowel sounds, no masses  Musculoskeletal:  Normal range of motion and no edema or tenderness bilaterally  No lymphadenopathy  Neurological:  alert, oriented, and normal reflexes bilaterally  Skin: warm and dry  Psychiatric:  normal mood and effect; behavior normal.    Labs:   Lab Results   Component Value Date    LABA1C 5.6 12/07/2016     Lab Results   Component Value Date    CHOL 141 07/28/2022     Lab Results   Component Value Date    HDL 35 (L) 07/28/2022     Lab Results   Component Value Date    LDLCALC 105 06/05/2017     Lab Results   Component Value Date    TRIG 68 07/28/2022     No results found for: [place], [place]  Lab Results   Component Value Date    WBC 5.7 07/28/2022    HGB 13.9 07/28/2022    HCT 41.7 07/28/2022    MCV 90.7 07/28/2022     07/28/2022     Lab Results   Component Value Date    INR 1.0 04/07/2021    PROTIME 10.0 04/07/2021     Lab Results   Component Value Date    GLUCOSE 90 07/28/2022    CREATININE 1.14 07/28/2022    BUN 21 07/28/2022     07/28/2022    K 3.5 (L) 07/28/2022     07/28/2022    CO2 22 07/28/2022     Lab Results   Component Value Date    ALT 16 07/28/2022    AST 17 07/28/2022    ALKPHOS 62 07/28/2022    BILITOT 0.49 07/28/2022     Lab Results   Component Value Date    LABALBU 4.2 07/28/2022     Lab Results   Component Value Date    TSH 2.38 10/05/2022     Assessment:  1. Essential hypertension    2. Hypothyroidism due to medication    3. Elevated PSA    4. Paroxysmal atrial fibrillation ([address]        Plan:  Patient blood pressure stable on current medications  Patient is on Eliquis for his atrial fibrillation and has not seen the cardiologist in almost 2 years and advised the patient to follow-up with them to discuss and also explained to him about the options including Xarelto Pradaxa and Savaysa and also discussed about Coumadin and advised patient to stay on Eliquis for now and office samples given and advised him to discuss with his pharmacy and insurance and also follow-up with cardiology and decide about further treatment options  Patient had a prostate biopsy which was done negative in December and following with urology regarding his elevated PSA  Patient's last TSH was within normal limits and was 2.38 and continue current dose of Synthroid  Review in 4 months           1. Andreia Shane received counseling on the following healthy behaviors: nutrition and exercise    2. Prior labs and health maintenance reviewed. 3.  Discussed use, benefit, and side effects of prescribed medications. Barriers to medication compliance addressed. All his questions were answered. Pt voiced understanding. Andreia Shane will continue current medications, diet and exercise. No orders of the defined types were placed in this encounter. Completed Refills               Requested Prescriptions      No prescriptions requested or ordered in this encounter     4. Patient given educational materials - see patient instructions    5. Was a self-tracking handout given in paper form or via [employer]? NO    No orders of the defined types were placed in this encounter. No follow-ups on file. Patient voiced understanding and agreed to treatment plan.      Electronically signed by Narendra Francisco MD on 2/16/2023 at 12:37 PM    This note is created with a voice recognition program and while intend to generate a document that accurately reflects the content of the visit, no guarantee can be provided that every mistake has been identified and corrected by editing.

## 2023-02-16 NOTE — TELEPHONE ENCOUNTER
Lu Truong is calling to request a refill on the following medication(s):    Medication Request:  Requested Prescriptions     Pending Prescriptions Disp Refills    montelukast (SINGULAIR) 10 MG tablet [Pharmacy Med Name: MONTELUKAST SOD 10 MG TAB 10 Tablet] 30 tablet 0     Sig: TAKE 1 TABELT EVERY MORNING       Last Visit Date (If Applicable):  65/7/9828    Next Visit Date:    2/16/2023

## 2023-02-27 ENCOUNTER — TELEPHONE (OUTPATIENT)
Dept: INTERNAL MEDICINE CLINIC | Age: 68
End: 2023-02-27

## 2023-03-22 RX ORDER — METOPROLOL SUCCINATE 50 MG/1
TABLET, EXTENDED RELEASE ORAL
Qty: 30 TABLET | Refills: 3 | Status: SHIPPED | OUTPATIENT
Start: 2023-03-22

## 2023-03-22 NOTE — TELEPHONE ENCOUNTER
Jennifer Crocker is calling to request a refill on the following medication(s):    Medication Request:  Requested Prescriptions     Pending Prescriptions Disp Refills    metoprolol succinate (TOPROL XL) 50 MG extended release tablet [Pharmacy Med Name: METOPROLOL SUCC ER 50 50 Tablet] 30 tablet 0     Sig: TAKE 1 TABLET EVERY DAY     Last fill 11/2/22  Last Visit Date (If Applicable):  5/85/8660    Next Visit Date:    6/15/2023

## 2023-03-29 RX ORDER — MONTELUKAST SODIUM 10 MG/1
TABLET ORAL
Qty: 30 TABLET | Refills: 3 | Status: SHIPPED | OUTPATIENT
Start: 2023-03-29

## 2023-03-29 RX ORDER — ATORVASTATIN CALCIUM 20 MG/1
TABLET, FILM COATED ORAL
Qty: 30 TABLET | Refills: 3 | Status: SHIPPED | OUTPATIENT
Start: 2023-03-29

## 2023-03-29 NOTE — TELEPHONE ENCOUNTER
Verónica Chavis is calling to request a refill on the following medication(s):    Medication Request:  Requested Prescriptions     Pending Prescriptions Disp Refills    atorvastatin (LIPITOR) 20 MG tablet [Pharmacy Med Name: ATORVASTATIN CALC 20 MG 20 Tablet] 30 tablet 3     Sig: TAKE 1 TABLET EVERY DAY    montelukast (SINGULAIR) 10 MG tablet [Pharmacy Med Name: MONTELUKAST SOD 10 MG TAB 10 Tablet] 30 tablet 3     Sig: TAKE 1 TABLET EVERY MORNING       Last Visit Date (If Applicable):  9/99/1272    Next Visit Date:    6/15/2023

## 2023-04-19 DIAGNOSIS — R97.20 ELEVATED PSA: ICD-10-CM

## 2023-04-19 DIAGNOSIS — E03.9 ACQUIRED HYPOTHYROIDISM: ICD-10-CM

## 2023-04-20 RX ORDER — LEVOTHYROXINE SODIUM 0.12 MG/1
TABLET ORAL
Qty: 30 TABLET | Refills: 1 | Status: SHIPPED | OUTPATIENT
Start: 2023-04-20

## 2023-04-20 NOTE — TELEPHONE ENCOUNTER
Stewart Laird is calling to request a refill on the following medication(s):    Medication Request:  Requested Prescriptions     Pending Prescriptions Disp Refills    levothyroxine (SYNTHROID) 125 MCG tablet [Pharmacy Med Name: LEVOTHYROXINE 125 MCG TABS 125 Tablet] 30 tablet 1     Sig: TAKE ONE TAB IN THE AM.       Last Visit Date (If Applicable):  8/87/3625    Next Visit Date:    6/15/2023

## 2023-05-03 ENCOUNTER — TELEPHONE (OUTPATIENT)
Dept: INTERNAL MEDICINE CLINIC | Age: 68
End: 2023-05-03

## 2023-05-03 RX ORDER — CIPROFLOXACIN 500 MG/1
500 TABLET, FILM COATED ORAL 2 TIMES DAILY
Qty: 14 TABLET | Refills: 0 | Status: SHIPPED | OUTPATIENT
Start: 2023-05-03 | End: 2023-05-10

## 2023-05-03 NOTE — TELEPHONE ENCOUNTER
Patient states he feels ok but again his throat feels like it closing x's 3 days. Is asking for the cipro to be called in?     Please send to vani    Please advise

## 2023-05-15 PROBLEM — I50.22 CHRONIC SYSTOLIC (CONGESTIVE) HEART FAILURE (HCC): Status: ACTIVE | Noted: 2023-05-15

## 2023-06-06 DIAGNOSIS — R97.20 ELEVATED PSA: ICD-10-CM

## 2023-06-06 DIAGNOSIS — E03.9 ACQUIRED HYPOTHYROIDISM: ICD-10-CM

## 2023-06-07 RX ORDER — LEVOTHYROXINE SODIUM 0.12 MG/1
TABLET ORAL
Qty: 90 TABLET | Refills: 1 | Status: SHIPPED | OUTPATIENT
Start: 2023-06-07

## 2023-06-07 RX ORDER — TAMSULOSIN HYDROCHLORIDE 0.4 MG/1
CAPSULE ORAL
Qty: 30 CAPSULE | Refills: 3 | OUTPATIENT
Start: 2023-06-07

## 2023-06-07 RX ORDER — VALSARTAN AND HYDROCHLOROTHIAZIDE 160; 12.5 MG/1; MG/1
TABLET, FILM COATED ORAL
Qty: 90 TABLET | Refills: 1 | Status: SHIPPED | OUTPATIENT
Start: 2023-06-07

## 2023-06-07 NOTE — TELEPHONE ENCOUNTER
Aida Long is calling to request a refill on the following medication(s):    Medication Request:  Requested Prescriptions     Pending Prescriptions Disp Refills    tamsulosin (FLOMAX) 0.4 MG capsule [Pharmacy Med Name: TAMSULOSIN HCL 0.4 MG CAPS 0.4 Capsule] 30 capsule 3     Sig: TAKE 1 CAPSULE DAILY       Last Visit Date (If Applicable):  0/94/8794    Next Visit Date:    6/15/2023

## 2023-06-07 NOTE — TELEPHONE ENCOUNTER
Theodore De Jesus is calling to request a refill on the following medication(s):    Medication Request:  Requested Prescriptions     Pending Prescriptions Disp Refills    valsartan-hydroCHLOROthiazide (DIOVAN-HCT) 160-12.5 MG per tablet [Pharmacy Med Name: Damondeedee Stephenson 160-12.5 -12.5 Tablet] 30 tablet 0     Sig: TAKE 1 TABLET EVERY DAY    levothyroxine (SYNTHROID) 125 MCG tablet [Pharmacy Med Name: LEVOTHYROXINE 125 MCG TABS 125 Tablet] 30 tablet 1     Sig: TAKE ONE TAB IN THE AM ON AN EMPTY STOMACH       Last Visit Date (If Applicable):  1/75/4049    Next Visit Date:    6/15/2023      Last refill on levothyroxine 4/20/23 and valsartan HCTZ 10/24/22. Prescription pending.

## 2023-06-29 ENCOUNTER — HOSPITAL ENCOUNTER (OUTPATIENT)
Age: 68
Setting detail: SPECIMEN
Discharge: HOME | End: 2023-06-29

## 2023-06-29 LAB — PSA SERPL-MCNC: 2.99 NG/ML

## 2023-07-14 RX ORDER — TAMSULOSIN HYDROCHLORIDE 0.4 MG/1
CAPSULE ORAL
Qty: 30 CAPSULE | Refills: 3 | OUTPATIENT
Start: 2023-07-14

## 2023-07-19 RX ORDER — MONTELUKAST SODIUM 10 MG/1
TABLET ORAL
Qty: 90 TABLET | Refills: 1 | Status: SHIPPED | OUTPATIENT
Start: 2023-07-19

## 2023-07-19 RX ORDER — ATORVASTATIN CALCIUM 20 MG/1
TABLET, FILM COATED ORAL
Qty: 90 TABLET | Refills: 1 | Status: SHIPPED | OUTPATIENT
Start: 2023-07-19

## 2023-07-19 NOTE — TELEPHONE ENCOUNTER
Kelly Mojica is calling to request a refill on the following medication(s):    Medication Request:  Requested Prescriptions     Pending Prescriptions Disp Refills    montelukast (SINGULAIR) 10 MG tablet [Pharmacy Med Name: MONTELUKAST SOD 10 MG TAB 10 Tablet] 30 tablet 3     Sig: TAKE 1 TABLET EVERY MORNING    atorvastatin (LIPITOR) 20 MG tablet [Pharmacy Med Name: ATORVASTATIN CALC 20 MG 20 Tablet] 30 tablet 3     Sig: TAKE 1 TABLET EVERY DAY       Last Visit Date (If Applicable):  3/98/6232    Next Visit Date:    10/16/2023      Last refill 3/29/23.  Prescriptions pending

## 2023-07-24 RX ORDER — TAMSULOSIN HYDROCHLORIDE 0.4 MG/1
CAPSULE ORAL
Qty: 30 CAPSULE | OUTPATIENT
Start: 2023-07-24

## 2023-08-21 ENCOUNTER — TELEPHONE (OUTPATIENT)
Dept: INTERNAL MEDICINE CLINIC | Age: 68
End: 2023-08-21

## 2023-08-21 DIAGNOSIS — C44.90 SKIN CANCER: Primary | ICD-10-CM

## 2023-08-21 RX ORDER — METOPROLOL SUCCINATE 50 MG/1
TABLET, EXTENDED RELEASE ORAL
Qty: 30 TABLET | Refills: 3 | Status: SHIPPED | OUTPATIENT
Start: 2023-08-21

## 2023-08-21 NOTE — TELEPHONE ENCOUNTER
Meli Bailey is calling to request a refill on the following medication(s):    Medication Request:  Requested Prescriptions     Pending Prescriptions Disp Refills    metoprolol succinate (TOPROL XL) 50 MG extended release tablet [Pharmacy Med Name: METOPROLOL SUCC ER 50 50 Tablet] 30 tablet 3     Sig: TAKE 1 TABLET EVERY DAY       Last Visit Date (If Applicable):  3/83/2020    Next Visit Date:    10/16/2023

## 2023-09-12 ENCOUNTER — TELEPHONE (OUTPATIENT)
Dept: FAMILY MEDICINE CLINIC | Age: 68
End: 2023-09-12

## 2023-09-13 NOTE — TELEPHONE ENCOUNTER
We will call this pt out of our [address], it is not marked urgent nor does anything in his chart state skin cancer.

## 2023-10-16 ENCOUNTER — OFFICE VISIT (OUTPATIENT)
Dept: INTERNAL MEDICINE CLINIC | Age: 68
End: 2023-10-16
Payer: COMMERCIAL

## 2023-10-16 VITALS
BODY MASS INDEX: 30.59 KG/M2 | HEART RATE: 75 BPM | RESPIRATION RATE: 18 BRPM | OXYGEN SATURATION: 96 % | SYSTOLIC BLOOD PRESSURE: 108 MMHG | HEIGHT: 75 IN | WEIGHT: 246 LBS | DIASTOLIC BLOOD PRESSURE: 64 MMHG | TEMPERATURE: 97 F

## 2023-10-16 DIAGNOSIS — E03.9 ACQUIRED HYPOTHYROIDISM: ICD-10-CM

## 2023-10-16 DIAGNOSIS — I10 ESSENTIAL HYPERTENSION: Primary | ICD-10-CM

## 2023-10-16 DIAGNOSIS — Z12.11 COLON CANCER SCREENING: ICD-10-CM

## 2023-10-16 DIAGNOSIS — E78.00 HIGH CHOLESTEROL: ICD-10-CM

## 2023-10-16 PROCEDURE — 3074F SYST BP LT 130 MM HG: CPT | Performed by: INTERNAL MEDICINE

## 2023-10-16 PROCEDURE — 1123F ACP DISCUSS/DSCN MKR DOCD: CPT | Performed by: INTERNAL MEDICINE

## 2023-10-16 PROCEDURE — 99214 OFFICE O/P EST MOD 30 MIN: CPT | Performed by: INTERNAL MEDICINE

## 2023-10-16 PROCEDURE — 3078F DIAST BP <80 MM HG: CPT | Performed by: INTERNAL MEDICINE

## 2023-10-16 NOTE — PROGRESS NOTES
Ruchi Kelley is a 76 y.o. male who presents for   Chief Complaint   Patient presents with    Health Maintenance     Pneumococcal vaccine, Hepatitis C screen, shingles vaccine, colorectal cancer screen, AAA screen, covid vaccine, lipids, flu vaccine     Follow-up     4 month follow up   Pt voices no concerns     and follow up of chronic medical problems. Patient Active Problem List   Diagnosis    Hyperlipidemia    Hypertension    Hypothyroidism    Epistaxis    Nevus    Basal cell carcinoma of nose    Hypogonadism in male    Localized edema    Atrial fibrillation with RVR (HCC)    New onset a-fib (HCC)    Alcohol use    Acute renal insufficiency    Azotemia    Elevated brain natriuretic peptide (BNP) level    Low HDL (under 40)    Left ventricular systolic dysfunction    Chronic systolic (congestive) heart failure     HPI  Here for follow-up on blood pressure and thyroid denies any new complaints    Current Outpatient Medications   Medication Sig Dispense Refill    metoprolol succinate (TOPROL XL) 50 MG extended release tablet TAKE 1 TABLET EVERY DAY 30 tablet 3    montelukast (SINGULAIR) 10 MG tablet TAKE 1 TABLET EVERY MORNING 90 tablet 1    atorvastatin (LIPITOR) 20 MG tablet TAKE 1 TABLET EVERY DAY 90 tablet 1    valsartan-hydroCHLOROthiazide (DIOVAN-HCT) 160-12.5 MG per tablet TAKE 1 TABLET EVERY DAY 90 tablet 1    levothyroxine (SYNTHROID) 125 MCG tablet TAKE ONE TAB IN THE AM ON AN EMPTY STOMACH 90 tablet 1    Potassium 99 MG TABS 1 tablet Orally Once a day for 30 day(s)      apixaban (ELIQUIS) 5 MG TABS tablet Take 1 tablet by mouth 2 times daily 90 tablet 3    tamsulosin (FLOMAX) 0.4 MG capsule Take 1 capsule by mouth daily      Ascorbic Acid (VITAMIN C) 250 MG tablet Take 4 tablets by mouth daily       No current facility-administered medications for this visit.        No Known Allergies    Past Medical History:   Diagnosis Date    Alcohol use     Hyperlipidemia     Hypertension     Thyroid disease

## 2023-10-17 ENCOUNTER — OFFICE VISIT (OUTPATIENT)
Dept: DERMATOLOGY | Age: 68
End: 2023-10-17
Payer: COMMERCIAL

## 2023-10-17 VITALS
HEART RATE: 66 BPM | OXYGEN SATURATION: 96 % | WEIGHT: 246.8 LBS | BODY MASS INDEX: 30.85 KG/M2 | TEMPERATURE: 98.6 F | SYSTOLIC BLOOD PRESSURE: 131 MMHG | DIASTOLIC BLOOD PRESSURE: 76 MMHG

## 2023-10-17 DIAGNOSIS — L82.1 SEBORRHEIC KERATOSES: ICD-10-CM

## 2023-10-17 DIAGNOSIS — L72.0 MILIA: ICD-10-CM

## 2023-10-17 DIAGNOSIS — D48.9 NEOPLASM OF UNCERTAIN BEHAVIOR: Primary | ICD-10-CM

## 2023-10-17 DIAGNOSIS — D23.9 BLUE NEVUS: ICD-10-CM

## 2023-10-17 DIAGNOSIS — L81.4 LENTIGINES: ICD-10-CM

## 2023-10-17 PROCEDURE — 3075F SYST BP GE 130 - 139MM HG: CPT | Performed by: PHYSICIAN ASSISTANT

## 2023-10-17 PROCEDURE — 1123F ACP DISCUSS/DSCN MKR DOCD: CPT | Performed by: PHYSICIAN ASSISTANT

## 2023-10-17 PROCEDURE — 99203 OFFICE O/P NEW LOW 30 MIN: CPT | Performed by: PHYSICIAN ASSISTANT

## 2023-10-17 PROCEDURE — 3078F DIAST BP <80 MM HG: CPT | Performed by: PHYSICIAN ASSISTANT

## 2023-10-17 NOTE — PROGRESS NOTES
history pertinent to this visit are listed below:    Assessment:   Diagnosis Orders   1. Neoplasm of uncertain behavior        2. Seborrheic keratoses        3. Blue nevus        4. Milia        5. Lentigines             Plan:  1. Neoplasm of uncertain behavior  - It is unclear what type of skin cancer was removed on the left temporal scalp. The patient's description of the recent scaly lesion, which is currently not present, is c/w a seborrheic keratosis. There is a well healed scar on the left temporal scalp. - patient was counseled that UV-damaged skin increases lifetime risk for skin cancer  - I recommended the patient apply broad spectrum spf 30+ sunscreen daily, reapplying every 2 hours. - In additional to regular use of sunscreen, I recommended broad-rimmed hats, long sleeves, and seeking the shade. 2. Seborrheic keratoses  - reassurance and education     3. Blue nevus  - reassurance and education     4. Milia (face)  - reassurance and education   - The risks of the procedure were discussed with the patient (scarring, infection, and lesion recurrence). The areas were then prepped with alcohol and each lesion was superficially pierced with a 25 gauge needle, and the cyst contents subsequently extracted with a comedone extractor tool. Hemostasis was obtained spontaneously. The pt tolerated the procedure well. In total, 3 milia were extracted. 5. Lentigines  - reassurance and education       RTC 1Y    Future Appointments   Date Time Provider [address] [place] [address]   2/13/2024  2:00 PM [facility]   10/17/2024  2:30 PM [facility]         There are no Patient Instructions on file for this visit.       Electronically signed by Kathleen Spivey PA-C on 10/17/23 at 5:09 PM EDT

## 2023-10-19 ENCOUNTER — HOSPITAL ENCOUNTER (OUTPATIENT)
Age: 68
Setting detail: SPECIMEN
Discharge: HOME | End: 2023-10-19

## 2023-10-19 DIAGNOSIS — Z12.11 COLON CANCER SCREENING: ICD-10-CM

## 2023-10-19 DIAGNOSIS — E03.9 ACQUIRED HYPOTHYROIDISM: ICD-10-CM

## 2023-10-19 DIAGNOSIS — I10 ESSENTIAL HYPERTENSION: ICD-10-CM

## 2023-10-19 DIAGNOSIS — E78.00 HIGH CHOLESTEROL: ICD-10-CM

## 2023-10-19 LAB
ALBUMIN SERPL-MCNC: 4.1 G/DL (ref 3.5–5.2)
ALBUMIN/GLOB SERPL: 1.6 {RATIO} (ref 1–2.5)
ALP SERPL-CCNC: 68 U/L (ref 40–129)
ALT SERPL-CCNC: 15 U/L (ref 5–41)
ANION GAP SERPL CALCULATED.3IONS-SCNC: 10 MMOL/L (ref 9–17)
AST SERPL-CCNC: 17 U/L
BILIRUB SERPL-MCNC: 0.7 MG/DL (ref 0.3–1.2)
BUN SERPL-MCNC: 18 MG/DL (ref 8–23)
CALCIUM SERPL-MCNC: 9.3 MG/DL (ref 8.6–10.4)
CHLORIDE SERPL-SCNC: 105 MMOL/L (ref 98–107)
CHOLEST SERPL-MCNC: 155 MG/DL
CHOLESTEROL/HDL RATIO: 4.1
CK SERPL-CCNC: 89 U/L (ref 39–308)
CO2 SERPL-SCNC: 27 MMOL/L (ref 20–31)
CREAT SERPL-MCNC: 0.9 MG/DL (ref 0.7–1.2)
ERYTHROCYTE [DISTWIDTH] IN BLOOD BY AUTOMATED COUNT: 13 % (ref 11.8–14.4)
GFR SERPL CREATININE-BSD FRML MDRD: >60 ML/MIN/1.73M2
GLUCOSE SERPL-MCNC: 84 MG/DL (ref 70–99)
HCT VFR BLD AUTO: 44.5 % (ref 40.7–50.3)
HDLC SERPL-MCNC: 38 MG/DL
HGB BLD-MCNC: 14.6 G/DL (ref 13–17)
LDLC SERPL CALC-MCNC: 102 MG/DL (ref 0–130)
MAGNESIUM SERPL-MCNC: 2 MG/DL (ref 1.6–2.6)
MCH RBC QN AUTO: 30.5 PG (ref 25.2–33.5)
MCHC RBC AUTO-ENTMCNC: 32.8 G/DL (ref 28.4–34.8)
MCV RBC AUTO: 92.9 FL (ref 82.6–102.9)
NRBC BLD-RTO: 0 PER 100 WBC
PLATELET # BLD AUTO: 215 K/UL (ref 138–453)
PMV BLD AUTO: 11.6 FL (ref 8.1–13.5)
POTASSIUM SERPL-SCNC: 4.3 MMOL/L (ref 3.7–5.3)
PROT SERPL-MCNC: 6.7 G/DL (ref 6.4–8.3)
RBC # BLD AUTO: 4.79 M/UL (ref 4.21–5.77)
SODIUM SERPL-SCNC: 142 MMOL/L (ref 135–144)
T4 FREE SERPL-MCNC: 1.5 NG/DL (ref 0.9–1.7)
TRIGL SERPL-MCNC: 75 MG/DL
TSH SERPL DL<=0.05 MIU/L-ACNC: 1.11 UIU/ML (ref 0.3–5)
VIT B12 SERPL-MCNC: 583 PG/ML (ref 232–1245)
WBC OTHER # BLD: 4.8 K/UL (ref 3.5–11.3)

## 2023-12-14 DIAGNOSIS — E03.9 ACQUIRED HYPOTHYROIDISM: ICD-10-CM

## 2023-12-14 DIAGNOSIS — R97.20 ELEVATED PSA: ICD-10-CM

## 2023-12-14 RX ORDER — METOPROLOL SUCCINATE 50 MG/1
TABLET, EXTENDED RELEASE ORAL
Qty: 30 TABLET | Refills: 5 | Status: SHIPPED | OUTPATIENT
Start: 2023-12-14

## 2023-12-14 RX ORDER — VALSARTAN AND HYDROCHLOROTHIAZIDE 160; 12.5 MG/1; MG/1
TABLET, FILM COATED ORAL
Qty: 30 TABLET | Refills: 5 | Status: SHIPPED | OUTPATIENT
Start: 2023-12-14

## 2023-12-14 RX ORDER — LEVOTHYROXINE SODIUM 0.12 MG/1
TABLET ORAL
Qty: 30 TABLET | Refills: 5 | Status: SHIPPED | OUTPATIENT
Start: 2023-12-14

## 2023-12-14 NOTE — TELEPHONE ENCOUNTER
Gavino Ramirez is calling to request a refill on the following medication(s):    Medication Request:  Requested Prescriptions     Pending Prescriptions Disp Refills    valsartan-hydroCHLOROthiazide (DIOVAN-HCT) 160-12.5 MG per tablet [Pharmacy Med Name: VALSARTAN-HCTZ 160-12.5 -12.5 Tablet] 30 tablet 1     Sig: TAKE 1 TABLET EVERY DAY    levothyroxine (SYNTHROID) 125 MCG tablet [Pharmacy Med Name: LEVOTHYROXINE 125 MCG TABS 125 Tablet] 30 tablet 1     Sig: TAKE ONE TAB IN THE MORNING ON AN EMPTY STOMACH    metoprolol succinate (TOPROL XL) 50 MG extended release tablet [Pharmacy Med Name: METOPROLOL SUCC ER 50 50 Tablet] 30 tablet 3     Sig: TAKE 1 TABLET EVERY DAY       Last Visit Date (If Applicable):  80/44/9878    Next Visit Date:    2/13/2024      Last refills 6/7 and 8/21/23. Prescriptions pending.

## 2024-01-08 RX ORDER — MONTELUKAST SODIUM 10 MG/1
TABLET ORAL
Qty: 90 TABLET | Refills: 0 | Status: SHIPPED | OUTPATIENT
Start: 2024-01-08

## 2024-01-08 RX ORDER — ATORVASTATIN CALCIUM 20 MG/1
TABLET, FILM COATED ORAL
Qty: 90 TABLET | Refills: 0 | Status: SHIPPED | OUTPATIENT
Start: 2024-01-08

## 2024-01-08 NOTE — TELEPHONE ENCOUNTER
Walker Wing is calling to request a refill on the following medication(s):    Medication Request:  Requested Prescriptions     Pending Prescriptions Disp Refills    atorvastatin (LIPITOR) 20 MG tablet [Pharmacy Med Name: ATORVASTATIN CALC 20 MG 20 Tablet] 30 tablet 1     Sig: TAKE 1 TABLET EVERY DAY    montelukast (SINGULAIR) 10 MG tablet [Pharmacy Med Name: MONTELUKAST SOD 10 MG TAB 10 Tablet] 30 tablet 1     Sig: TAKE 1 TABLET EVERY MORNING       Last Visit Date (If Applicable):  10/16/2023    Next Visit Date:    2/13/2024    Last filled 7/19/23 90 day 1 refill, order pending

## 2024-02-13 ENCOUNTER — OFFICE VISIT (OUTPATIENT)
Dept: INTERNAL MEDICINE CLINIC | Age: 69
End: 2024-02-13
Payer: COMMERCIAL

## 2024-02-13 VITALS
SYSTOLIC BLOOD PRESSURE: 130 MMHG | DIASTOLIC BLOOD PRESSURE: 76 MMHG | HEART RATE: 58 BPM | BODY MASS INDEX: 30.16 KG/M2 | HEIGHT: 75 IN | RESPIRATION RATE: 15 BRPM | TEMPERATURE: 97.3 F | WEIGHT: 242.6 LBS | OXYGEN SATURATION: 99 %

## 2024-02-13 DIAGNOSIS — E78.00 HIGH CHOLESTEROL: ICD-10-CM

## 2024-02-13 DIAGNOSIS — I10 ESSENTIAL HYPERTENSION: Primary | ICD-10-CM

## 2024-02-13 DIAGNOSIS — R60.0 LOCALIZED EDEMA: ICD-10-CM

## 2024-02-13 DIAGNOSIS — I48.0 PAROXYSMAL ATRIAL FIBRILLATION (HCC): ICD-10-CM

## 2024-02-13 DIAGNOSIS — E03.9 ACQUIRED HYPOTHYROIDISM: ICD-10-CM

## 2024-02-13 DIAGNOSIS — I50.22 CHRONIC SYSTOLIC (CONGESTIVE) HEART FAILURE (HCC): ICD-10-CM

## 2024-02-13 PROCEDURE — 1123F ACP DISCUSS/DSCN MKR DOCD: CPT | Performed by: INTERNAL MEDICINE

## 2024-02-13 PROCEDURE — G8417 CALC BMI ABV UP PARAM F/U: HCPCS | Performed by: INTERNAL MEDICINE

## 2024-02-13 PROCEDURE — 3078F DIAST BP <80 MM HG: CPT | Performed by: INTERNAL MEDICINE

## 2024-02-13 PROCEDURE — 99214 OFFICE O/P EST MOD 30 MIN: CPT | Performed by: INTERNAL MEDICINE

## 2024-02-13 PROCEDURE — 3017F COLORECTAL CA SCREEN DOC REV: CPT | Performed by: INTERNAL MEDICINE

## 2024-02-13 PROCEDURE — 3075F SYST BP GE 130 - 139MM HG: CPT | Performed by: INTERNAL MEDICINE

## 2024-02-13 PROCEDURE — 1036F TOBACCO NON-USER: CPT | Performed by: INTERNAL MEDICINE

## 2024-02-13 PROCEDURE — G8484 FLU IMMUNIZE NO ADMIN: HCPCS | Performed by: INTERNAL MEDICINE

## 2024-02-13 PROCEDURE — G8427 DOCREV CUR MEDS BY ELIG CLIN: HCPCS | Performed by: INTERNAL MEDICINE

## 2024-02-13 NOTE — PROGRESS NOTES
Walker Wing is a 68 y.o. male who presents for   Chief Complaint   Patient presents with    Hypertension     Follow up    Health Maintenance     Covid, flu, aaa, rsv, shingles, hep c, pneumo    and follow up of chronic medical problems.  Patient Active Problem List   Diagnosis    Hyperlipidemia    Hypertension    Hypothyroidism    Epistaxis    Nevus    Basal cell carcinoma of nose    Hypogonadism in male    Localized edema    Atrial fibrillation with RVR (HCC)    New onset a-fib (HCC)    Alcohol use    Acute renal insufficiency    Azotemia    Elevated brain natriuretic peptide (BNP) level    Low HDL (under 40)    Left ventricular systolic dysfunction    Chronic systolic (congestive) heart failure     HPI  Here for follow-up on blood pressure and thyroid denies any new complaints    Current Outpatient Medications   Medication Sig Dispense Refill    Compression Stockings MISC by Does not apply route 20-30 mmHg calf length 1 each 0    atorvastatin (LIPITOR) 20 MG tablet TAKE 1 TABLET EVERY DAY 90 tablet 0    montelukast (SINGULAIR) 10 MG tablet TAKE 1 TABLET EVERY MORNING 90 tablet 0    valsartan-hydroCHLOROthiazide (DIOVAN-HCT) 160-12.5 MG per tablet TAKE 1 TABLET EVERY DAY 30 tablet 5    levothyroxine (SYNTHROID) 125 MCG tablet TAKE ONE TAB IN THE MORNING ON AN EMPTY STOMACH 30 tablet 5    metoprolol succinate (TOPROL XL) 50 MG extended release tablet TAKE 1 TABLET EVERY DAY 30 tablet 5    ELIQUIS 5 MG TABS tablet Take 1 tablet by mouth twice daily 240 tablet 0    Potassium 99 MG TABS 1 tablet Orally Once a day for 30 day(s)      tamsulosin (FLOMAX) 0.4 MG capsule Take 1 capsule by mouth daily      Ascorbic Acid (VITAMIN C) 250 MG tablet Take 4 tablets by mouth daily       No current facility-administered medications for this visit.       No Known Allergies    Past Medical History:   Diagnosis Date    Alcohol use     Hyperlipidemia     Hypertension     Thyroid disease     Wears contact lenses     Wears glasses

## 2024-04-04 RX ORDER — MONTELUKAST SODIUM 10 MG/1
TABLET ORAL
Qty: 30 TABLET | Refills: 5 | Status: SHIPPED | OUTPATIENT
Start: 2024-04-04

## 2024-04-04 RX ORDER — ATORVASTATIN CALCIUM 20 MG/1
TABLET, FILM COATED ORAL
Qty: 30 TABLET | Refills: 5 | Status: SHIPPED | OUTPATIENT
Start: 2024-04-04

## 2024-04-04 NOTE — TELEPHONE ENCOUNTER
Walker Wing is calling to request a refill on the following medication(s):    Medication Request:  Requested Prescriptions     Pending Prescriptions Disp Refills    montelukast (SINGULAIR) 10 MG tablet [Pharmacy Med Name: MONTELUKAST SOD 10 MG TAB 10 Tablet] 30 tablet 0     Sig: TAKE 1 TABLET EVERY MORNING    atorvastatin (LIPITOR) 20 MG tablet [Pharmacy Med Name: ATORVASTATIN CALC 20 MG 20 Tablet] 30 tablet 0     Sig: TAKE 1 TABLET EVERY DAY       Last Visit Date (If Applicable):  2/13/2024    Next Visit Date:    8/13/2024      Last refill 1/8/24. prescriptions pending.

## 2024-05-28 DIAGNOSIS — R97.20 ELEVATED PSA: ICD-10-CM

## 2024-05-28 DIAGNOSIS — E03.9 ACQUIRED HYPOTHYROIDISM: ICD-10-CM

## 2024-05-29 RX ORDER — LEVOTHYROXINE SODIUM 0.12 MG/1
TABLET ORAL
Qty: 30 TABLET | Refills: 1 | Status: SHIPPED | OUTPATIENT
Start: 2024-05-29

## 2024-05-29 RX ORDER — METOPROLOL SUCCINATE 50 MG/1
TABLET, EXTENDED RELEASE ORAL
Qty: 30 TABLET | Refills: 1 | Status: SHIPPED | OUTPATIENT
Start: 2024-05-29

## 2024-05-29 RX ORDER — VALSARTAN AND HYDROCHLOROTHIAZIDE 160; 12.5 MG/1; MG/1
TABLET, FILM COATED ORAL
Qty: 30 TABLET | Refills: 1 | Status: SHIPPED | OUTPATIENT
Start: 2024-05-29

## 2024-05-29 NOTE — TELEPHONE ENCOUNTER
Walker Wing is calling to request a refill on the following medication(s):    Medication Request:  Requested Prescriptions     Pending Prescriptions Disp Refills    valsartan-hydroCHLOROthiazide (DIOVAN-HCT) 160-12.5 MG per tablet [Pharmacy Med Name: VALSARTAN-HCTZ 160-12.5 -12.5 Tablet] 30 tablet 5     Sig: TAKE 1 TABLET EVERY DAY    levothyroxine (SYNTHROID) 125 MCG tablet [Pharmacy Med Name: LEVOTHYROXINE 125 MCG TABS 125 Tablet] 30 tablet 5     Sig: TAKE ONE TAB IN THE MORNING ON AN EMPTY STOMACH    metoprolol succinate (TOPROL XL) 50 MG extended release tablet [Pharmacy Med Name: METOPROLOL SUCC ER 50 50 Tablet] 30 tablet 5     Sig: TAKE 1 TABLET EVERY DAY       Last Visit Date (If Applicable):  2/13/24    Next Visit Date:    8/13/24        Last refills 12/14/23. Prescriptions pending.

## 2024-07-31 DIAGNOSIS — E03.9 ACQUIRED HYPOTHYROIDISM: ICD-10-CM

## 2024-07-31 DIAGNOSIS — R97.20 ELEVATED PSA: ICD-10-CM

## 2024-07-31 RX ORDER — LEVOTHYROXINE SODIUM 0.12 MG/1
TABLET ORAL
Qty: 30 TABLET | Refills: 1 | Status: SHIPPED | OUTPATIENT
Start: 2024-07-31

## 2024-07-31 RX ORDER — VALSARTAN AND HYDROCHLOROTHIAZIDE 160; 12.5 MG/1; MG/1
TABLET, FILM COATED ORAL
Qty: 30 TABLET | Refills: 1 | Status: SHIPPED | OUTPATIENT
Start: 2024-07-31

## 2024-07-31 RX ORDER — METOPROLOL SUCCINATE 50 MG/1
TABLET, EXTENDED RELEASE ORAL
Qty: 30 TABLET | Refills: 1 | Status: SHIPPED | OUTPATIENT
Start: 2024-07-31

## 2024-07-31 NOTE — TELEPHONE ENCOUNTER
Walker Wing is calling to request a refill on the following medication(s):    Medication Request:  Requested Prescriptions     Pending Prescriptions Disp Refills    valsartan-hydroCHLOROthiazide (DIOVAN-HCT) 160-12.5 MG per tablet [Pharmacy Med Name: VALSARTAN-HCTZ 160-12.5 -12.5 Tablet] 30 tablet 1     Sig: TAKE 1 TABLET EVERY DAY    levothyroxine (SYNTHROID) 125 MCG tablet [Pharmacy Med Name: LEVOTHYROXINE 125 MCG TABS 125 Tablet] 30 tablet 1     Sig: TAKE ONE TAB IN THE MORNING ON AN EMPTY STOMACH    metoprolol succinate (TOPROL XL) 50 MG extended release tablet [Pharmacy Med Name: METOPROLOL SUCC ER 50 50 Tablet] 30 tablet 1     Sig: TAKE 1 TABLET EVERY DAY       Last Visit Date (If Applicable):  2/13/2024    Next Visit Date:    8/13/2024

## 2024-08-13 ENCOUNTER — OFFICE VISIT (OUTPATIENT)
Dept: FAMILY MEDICINE CLINIC | Age: 69
End: 2024-08-13
Payer: COMMERCIAL

## 2024-08-13 VITALS
DIASTOLIC BLOOD PRESSURE: 64 MMHG | WEIGHT: 248.2 LBS | BODY MASS INDEX: 30.86 KG/M2 | HEIGHT: 75 IN | SYSTOLIC BLOOD PRESSURE: 126 MMHG | HEART RATE: 65 BPM | TEMPERATURE: 98.2 F | OXYGEN SATURATION: 94 %

## 2024-08-13 DIAGNOSIS — E03.9 ACQUIRED HYPOTHYROIDISM: Primary | ICD-10-CM

## 2024-08-13 DIAGNOSIS — E78.00 PURE HYPERCHOLESTEROLEMIA: ICD-10-CM

## 2024-08-13 DIAGNOSIS — E55.9 VITAMIN D DEFICIENCY: ICD-10-CM

## 2024-08-13 DIAGNOSIS — I10 PRIMARY HYPERTENSION: ICD-10-CM

## 2024-08-13 DIAGNOSIS — I48.91 ATRIAL FIBRILLATION WITH RVR (HCC): ICD-10-CM

## 2024-08-13 DIAGNOSIS — D68.69 SECONDARY HYPERCOAGULABLE STATE (HCC): ICD-10-CM

## 2024-08-13 PROCEDURE — 3017F COLORECTAL CA SCREEN DOC REV: CPT | Performed by: INTERNAL MEDICINE

## 2024-08-13 PROCEDURE — 99214 OFFICE O/P EST MOD 30 MIN: CPT | Performed by: INTERNAL MEDICINE

## 2024-08-13 PROCEDURE — G8427 DOCREV CUR MEDS BY ELIG CLIN: HCPCS | Performed by: INTERNAL MEDICINE

## 2024-08-13 PROCEDURE — 3074F SYST BP LT 130 MM HG: CPT | Performed by: INTERNAL MEDICINE

## 2024-08-13 PROCEDURE — 3078F DIAST BP <80 MM HG: CPT | Performed by: INTERNAL MEDICINE

## 2024-08-13 PROCEDURE — 1036F TOBACCO NON-USER: CPT | Performed by: INTERNAL MEDICINE

## 2024-08-13 PROCEDURE — G8417 CALC BMI ABV UP PARAM F/U: HCPCS | Performed by: INTERNAL MEDICINE

## 2024-08-13 PROCEDURE — 1123F ACP DISCUSS/DSCN MKR DOCD: CPT | Performed by: INTERNAL MEDICINE

## 2024-08-13 SDOH — ECONOMIC STABILITY: FOOD INSECURITY: WITHIN THE PAST 12 MONTHS, YOU WORRIED THAT YOUR FOOD WOULD RUN OUT BEFORE YOU GOT MONEY TO BUY MORE.: NEVER TRUE

## 2024-08-13 SDOH — ECONOMIC STABILITY: INCOME INSECURITY: HOW HARD IS IT FOR YOU TO PAY FOR THE VERY BASICS LIKE FOOD, HOUSING, MEDICAL CARE, AND HEATING?: NOT HARD AT ALL

## 2024-08-13 SDOH — ECONOMIC STABILITY: FOOD INSECURITY: WITHIN THE PAST 12 MONTHS, THE FOOD YOU BOUGHT JUST DIDN'T LAST AND YOU DIDN'T HAVE MONEY TO GET MORE.: NEVER TRUE

## 2024-08-13 NOTE — PROGRESS NOTES
hypercoagulable state (HCC)        Plan:  Patient stated that he is off anticoagulation for the last 6 months because the insurance did not pay for him and patient has not seen the cardiologist and I advised him strongly to go and see the cardiologist and a new referral placed and discussed with him about the anticoagulation and patient not interested in taking warfarin and I explained to him the only other option is Xarelto which may be equally expensive and patient says that he is going to cardiology office now and make an appointment  Blood pressure is stable and continue valsartan hydrochlorothiazide and metoprolol and patient is currently in sinus rhythm and I explained to member the risk of stroke  Continue atorvastatin 20 mg daily for his cholesterol and new labs ordered  Continue levothyroxine 125 mcg daily and new labs ordered to check for TSH and free T4 along with CBC and CMP and vitamin D  Review in 6 months           1.  Walker received counseling on the following healthy behaviors: nutrition and exercise    2. Prior labs and health maintenance reviewed.     3.  Discussed use, benefit, and side effects of prescribed medications.  Barriers to medication compliance addressed.  All his questions were answered.  Pt voiced understanding.   Walker will continue current medications, diet and exercise.            No orders of the defined types were placed in this encounter.         Completed Refills               Requested Prescriptions      No prescriptions requested or ordered in this encounter     4. Patient given educational materials - see patient instructions    5. Was a self-tracking handout given in paper form or via Tele[employer]?  NO    Orders Placed This Encounter   Procedures    Comprehensive Metabolic Panel     Standing Status:   Future     Standing Expiration Date:   8/13/2025    Vitamin B12     Standing Status:   Future     Standing Expiration Date:   8/13/2025    CBC     Standing Status:   Future

## 2024-08-14 ENCOUNTER — HOSPITAL ENCOUNTER (OUTPATIENT)
Age: 69
Setting detail: SPECIMEN
Discharge: HOME | End: 2024-08-14

## 2024-08-14 LAB — PSA SERPL-MCNC: 2.8 NG/ML (ref 0–4)

## 2024-08-26 ENCOUNTER — HOSPITAL ENCOUNTER (OUTPATIENT)
Age: 69
Setting detail: SPECIMEN
Discharge: HOME | End: 2024-08-26

## 2024-08-26 DIAGNOSIS — E78.00 PURE HYPERCHOLESTEROLEMIA: ICD-10-CM

## 2024-08-26 DIAGNOSIS — I10 PRIMARY HYPERTENSION: ICD-10-CM

## 2024-08-26 DIAGNOSIS — E55.9 VITAMIN D DEFICIENCY: ICD-10-CM

## 2024-08-26 DIAGNOSIS — E03.9 ACQUIRED HYPOTHYROIDISM: ICD-10-CM

## 2024-08-26 DIAGNOSIS — I48.91 ATRIAL FIBRILLATION WITH RVR (HCC): ICD-10-CM

## 2024-08-26 LAB
25(OH)D3 SERPL-MCNC: 32 NG/ML (ref 30–100)
ALBUMIN SERPL-MCNC: 4.4 G/DL (ref 3.5–5.2)
ALBUMIN/GLOB SERPL: 2 {RATIO} (ref 1–2.5)
ALP SERPL-CCNC: 71 U/L (ref 40–129)
ALT SERPL-CCNC: 17 U/L (ref 10–50)
ANION GAP SERPL CALCULATED.3IONS-SCNC: 10 MMOL/L (ref 9–16)
AST SERPL-CCNC: 22 U/L (ref 10–50)
BILIRUB SERPL-MCNC: 0.7 MG/DL (ref 0–1.2)
BUN SERPL-MCNC: 20 MG/DL (ref 8–23)
CALCIUM SERPL-MCNC: 9.2 MG/DL (ref 8.6–10.4)
CHLORIDE SERPL-SCNC: 106 MMOL/L (ref 98–107)
CHOLEST SERPL-MCNC: 167 MG/DL (ref 0–199)
CHOLESTEROL/HDL RATIO: 4
CK SERPL-CCNC: 73 U/L (ref 39–308)
CO2 SERPL-SCNC: 24 MMOL/L (ref 20–31)
CREAT SERPL-MCNC: 1.1 MG/DL (ref 0.7–1.2)
ERYTHROCYTE [DISTWIDTH] IN BLOOD BY AUTOMATED COUNT: 12.8 % (ref 11.8–14.4)
GFR, ESTIMATED: 74 ML/MIN/1.73M2
GLUCOSE SERPL-MCNC: 88 MG/DL (ref 74–99)
HCT VFR BLD AUTO: 42.7 % (ref 40.7–50.3)
HDLC SERPL-MCNC: 40 MG/DL
HGB BLD-MCNC: 14.6 G/DL (ref 13–17)
LDLC SERPL CALC-MCNC: 112 MG/DL (ref 0–100)
MAGNESIUM SERPL-MCNC: 1.8 MG/DL (ref 1.6–2.4)
MCH RBC QN AUTO: 30.7 PG (ref 25.2–33.5)
MCHC RBC AUTO-ENTMCNC: 34.2 G/DL (ref 28.4–34.8)
MCV RBC AUTO: 89.9 FL (ref 82.6–102.9)
NRBC BLD-RTO: 0 PER 100 WBC
PLATELET # BLD AUTO: 198 K/UL (ref 138–453)
PMV BLD AUTO: 11.7 FL (ref 8.1–13.5)
POTASSIUM SERPL-SCNC: 4.2 MMOL/L (ref 3.7–5.3)
PROT SERPL-MCNC: 6.8 G/DL (ref 6.6–8.7)
RBC # BLD AUTO: 4.75 M/UL (ref 4.21–5.77)
SODIUM SERPL-SCNC: 140 MMOL/L (ref 136–145)
T4 FREE SERPL-MCNC: 1.3 NG/DL (ref 0.92–1.68)
TRIGL SERPL-MCNC: 73 MG/DL
TSH SERPL DL<=0.05 MIU/L-ACNC: 2.45 UIU/ML (ref 0.27–4.2)
VIT B12 SERPL-MCNC: 629 PG/ML (ref 232–1245)
VLDLC SERPL CALC-MCNC: 15 MG/DL
WBC OTHER # BLD: 5.2 K/UL (ref 3.5–11.3)

## 2024-09-26 DIAGNOSIS — R97.20 ELEVATED PSA: ICD-10-CM

## 2024-09-26 DIAGNOSIS — E03.9 ACQUIRED HYPOTHYROIDISM: ICD-10-CM

## 2024-09-26 RX ORDER — LEVOTHYROXINE SODIUM 125 UG/1
125 TABLET ORAL DAILY
Qty: 90 TABLET | Refills: 1 | Status: SHIPPED | OUTPATIENT
Start: 2024-09-26

## 2024-09-26 RX ORDER — MONTELUKAST SODIUM 10 MG/1
10 TABLET ORAL EVERY MORNING
Qty: 90 TABLET | Refills: 1 | Status: SHIPPED | OUTPATIENT
Start: 2024-09-26

## 2024-09-26 RX ORDER — ATORVASTATIN CALCIUM 20 MG/1
TABLET, FILM COATED ORAL
Qty: 90 TABLET | Refills: 1 | Status: SHIPPED | OUTPATIENT
Start: 2024-09-26

## 2024-09-26 RX ORDER — VALSARTAN AND HYDROCHLOROTHIAZIDE 160; 12.5 MG/1; MG/1
TABLET, FILM COATED ORAL
Qty: 90 TABLET | Refills: 1 | Status: SHIPPED | OUTPATIENT
Start: 2024-09-26

## 2024-09-26 RX ORDER — METOPROLOL SUCCINATE 50 MG/1
TABLET, EXTENDED RELEASE ORAL
Qty: 90 TABLET | Refills: 1 | Status: SHIPPED | OUTPATIENT
Start: 2024-09-26

## 2024-10-17 ENCOUNTER — OFFICE VISIT (OUTPATIENT)
Dept: DERMATOLOGY | Age: 69
End: 2024-10-17
Payer: COMMERCIAL

## 2024-10-17 VITALS
TEMPERATURE: 97.3 F | OXYGEN SATURATION: 98 % | WEIGHT: 250.2 LBS | HEIGHT: 75 IN | HEART RATE: 61 BPM | SYSTOLIC BLOOD PRESSURE: 143 MMHG | BODY MASS INDEX: 31.11 KG/M2 | DIASTOLIC BLOOD PRESSURE: 75 MMHG

## 2024-10-17 DIAGNOSIS — L82.1 SEBORRHEIC KERATOSES: ICD-10-CM

## 2024-10-17 DIAGNOSIS — L71.9 ROSACEA: Primary | ICD-10-CM

## 2024-10-17 PROCEDURE — 3017F COLORECTAL CA SCREEN DOC REV: CPT | Performed by: PHYSICIAN ASSISTANT

## 2024-10-17 PROCEDURE — 3078F DIAST BP <80 MM HG: CPT | Performed by: PHYSICIAN ASSISTANT

## 2024-10-17 PROCEDURE — 1036F TOBACCO NON-USER: CPT | Performed by: PHYSICIAN ASSISTANT

## 2024-10-17 PROCEDURE — 99214 OFFICE O/P EST MOD 30 MIN: CPT | Performed by: PHYSICIAN ASSISTANT

## 2024-10-17 PROCEDURE — 3077F SYST BP >= 140 MM HG: CPT | Performed by: PHYSICIAN ASSISTANT

## 2024-10-17 PROCEDURE — G8417 CALC BMI ABV UP PARAM F/U: HCPCS | Performed by: PHYSICIAN ASSISTANT

## 2024-10-17 PROCEDURE — G8427 DOCREV CUR MEDS BY ELIG CLIN: HCPCS | Performed by: PHYSICIAN ASSISTANT

## 2024-10-17 PROCEDURE — G8484 FLU IMMUNIZE NO ADMIN: HCPCS | Performed by: PHYSICIAN ASSISTANT

## 2024-10-17 PROCEDURE — 1123F ACP DISCUSS/DSCN MKR DOCD: CPT | Performed by: PHYSICIAN ASSISTANT

## 2024-10-17 NOTE — PROGRESS NOTES
Dermatology Patient Note  [facility], [facility] DERMATOLOGY  [address]  [address]  [place] [facility] [facility] [address]  Dept: [phone]  Dept Fax: [phone]      VISITDATE: 10/17/2024   REFERRING PROVIDER: No ref. provider found      Wakler Wing is a 69 y.o. male  who presents today in the office for:    follow up (Pt presents today for white spots under both eyes. He states that they have been there for about a month. He denies any itching or burning at the time.)      HISTORY OF PRESENT ILLNESS:  As above.  Pt states that this seems to be worsening with colder weather.  He admits to hot showers with said weather change.  Lesions are not fixed, rather, they go away and he gets new ones.    MEDICAL PROBLEMS:  Patient Active Problem List    Diagnosis Date Noted    Chronic systolic (congestive) heart failure 05/15/2023     Priority: High    Secondary hypercoagulable state (HCC) 08/13/2024    Low HDL (under 40) 04/08/2021    Left ventricular systolic dysfunction 04/08/2021    Alcohol use 04/07/2021    Acute renal insufficiency 04/07/2021    Azotemia     Elevated brain natriuretic peptide (BNP) level     Atrial fibrillation with RVR ([facility]) 04/06/2021    New onset a-fib ([facility]) 04/06/2021    Localized edema 10/11/2017    Hypogonadism in male 12/07/2016    Basal cell carcinoma of nose 12/04/2015    Hypothyroidism 02/17/2014    Epistaxis 02/17/2014    Nevus 02/17/2014    Hyperlipidemia     Hypertension        CURRENT MEDICATIONS:   Current Outpatient Medications   Medication Sig Dispense Refill    metroNIDAZOLE (METROCREAM) 0.75 % cream Apply a pea sized amount, to affected region of face, 2 times daily. 45 g 3    atorvastatin (LIPITOR) 20 MG tablet TAKE 1 TABLET EVERY DAY 90 tablet 1    levothyroxine (SYNTHROID) 125 MCG tablet Take 1 tablet by mouth Daily 90 tablet 1    metoprolol succinate (TOPROL XL) 50 MG extended release tablet TAKE 1 TABLET EVERY DAY 90 tablet 1

## 2025-01-22 RX ORDER — MONTELUKAST SODIUM 10 MG/1
10 TABLET ORAL EVERY MORNING
Qty: 90 TABLET | Refills: 1 | OUTPATIENT
Start: 2025-01-22

## 2025-02-25 DIAGNOSIS — E78.00 PURE HYPERCHOLESTEROLEMIA: Primary | ICD-10-CM

## 2025-02-25 RX ORDER — ATORVASTATIN CALCIUM 20 MG/1
TABLET, FILM COATED ORAL
Qty: 30 TABLET | Refills: 0 | Status: SHIPPED | OUTPATIENT
Start: 2025-02-25

## 2025-02-25 NOTE — TELEPHONE ENCOUNTER
Walker Wing is calling to request a refill on the following medication(s):    Medication Request:  Requested Prescriptions     Pending Prescriptions Disp Refills    atorvastatin (LIPITOR) 20 MG tablet [Pharmacy Med Name: ATORVASTATIN 20 MG TABLET 20 Tablet] 30 tablet 1     Sig: TAKE 1 TABLET EVERY DAY       Last Visit Date (If Applicable):  8/13/2024    Next Visit Date:    3/4/2025

## 2025-03-04 ENCOUNTER — HOSPITAL ENCOUNTER (OUTPATIENT)
Age: 70
Setting detail: SPECIMEN
Discharge: HOME | End: 2025-03-04

## 2025-03-04 ENCOUNTER — OFFICE VISIT (OUTPATIENT)
Dept: FAMILY MEDICINE CLINIC | Age: 70
End: 2025-03-04
Payer: COMMERCIAL

## 2025-03-04 VITALS
DIASTOLIC BLOOD PRESSURE: 84 MMHG | SYSTOLIC BLOOD PRESSURE: 126 MMHG | HEART RATE: 61 BPM | BODY MASS INDEX: 29.95 KG/M2 | OXYGEN SATURATION: 96 % | WEIGHT: 239.6 LBS

## 2025-03-04 DIAGNOSIS — Z11.59 NEED FOR HEPATITIS C SCREENING TEST: ICD-10-CM

## 2025-03-04 DIAGNOSIS — E55.9 VITAMIN D INSUFFICIENCY: ICD-10-CM

## 2025-03-04 DIAGNOSIS — E03.9 ACQUIRED HYPOTHYROIDISM: ICD-10-CM

## 2025-03-04 DIAGNOSIS — E78.00 PURE HYPERCHOLESTEROLEMIA: ICD-10-CM

## 2025-03-04 DIAGNOSIS — I50.22 CHRONIC SYSTOLIC (CONGESTIVE) HEART FAILURE (HCC): ICD-10-CM

## 2025-03-04 DIAGNOSIS — I87.2 CHRONIC VENOUS INSUFFICIENCY: ICD-10-CM

## 2025-03-04 DIAGNOSIS — Z12.11 COLON CANCER SCREENING: ICD-10-CM

## 2025-03-04 DIAGNOSIS — R97.20 ELEVATED PSA: ICD-10-CM

## 2025-03-04 DIAGNOSIS — I10 PRIMARY HYPERTENSION: ICD-10-CM

## 2025-03-04 DIAGNOSIS — Z13.31 DEPRESSION SCREENING: ICD-10-CM

## 2025-03-04 DIAGNOSIS — I48.91 ATRIAL FIBRILLATION WITH RVR (HCC): ICD-10-CM

## 2025-03-04 DIAGNOSIS — Z87.891 FORMER SMOKER: ICD-10-CM

## 2025-03-04 DIAGNOSIS — Z71.89 ENCOUNTER FOR MEDICATION REVIEW AND COUNSELING: ICD-10-CM

## 2025-03-04 DIAGNOSIS — Z00.00 ROUTINE HEALTH MAINTENANCE: ICD-10-CM

## 2025-03-04 DIAGNOSIS — N40.1 BENIGN PROSTATIC HYPERPLASIA WITH LOWER URINARY TRACT SYMPTOMS, SYMPTOM DETAILS UNSPECIFIED: ICD-10-CM

## 2025-03-04 DIAGNOSIS — Z76.0 ENCOUNTER FOR MEDICATION REFILL: ICD-10-CM

## 2025-03-04 DIAGNOSIS — Z76.89 ENCOUNTER TO ESTABLISH CARE: Primary | ICD-10-CM

## 2025-03-04 DIAGNOSIS — C44.311 BASAL CELL CARCINOMA OF NOSE: ICD-10-CM

## 2025-03-04 DIAGNOSIS — Z13.6 SCREENING FOR AAA (ABDOMINAL AORTIC ANEURYSM): ICD-10-CM

## 2025-03-04 LAB
25(OH)D3 SERPL-MCNC: 39.2 NG/ML (ref 30–100)
ALBUMIN SERPL-MCNC: 4.1 G/DL (ref 3.5–5.2)
ALBUMIN/GLOB SERPL: 1.4 {RATIO} (ref 1–2.5)
ALP SERPL-CCNC: 72 U/L (ref 40–129)
ALT SERPL-CCNC: 39 U/L (ref 10–50)
ANION GAP SERPL CALCULATED.3IONS-SCNC: 11 MMOL/L (ref 9–16)
AST SERPL-CCNC: 30 U/L (ref 10–50)
BASOPHILS # BLD: 0.06 K/UL (ref 0–0.2)
BASOPHILS NFR BLD: 1 % (ref 0–2)
BILIRUB SERPL-MCNC: 0.6 MG/DL (ref 0–1.2)
BILIRUB UR QL STRIP: NEGATIVE
BNP SERPL-MCNC: 223 PG/ML (ref 0–125)
BUN SERPL-MCNC: 19 MG/DL (ref 8–23)
CALCIUM SERPL-MCNC: 9.8 MG/DL (ref 8.6–10.4)
CHLORIDE SERPL-SCNC: 104 MMOL/L (ref 98–107)
CHOLEST SERPL-MCNC: 145 MG/DL (ref 0–199)
CHOLESTEROL/HDL RATIO: 4.5
CLARITY UR: CLEAR
CO2 SERPL-SCNC: 25 MMOL/L (ref 20–31)
COLOR UR: YELLOW
COMMENT: NORMAL
CREAT SERPL-MCNC: 1 MG/DL (ref 0.7–1.2)
CRP SERPL HS-MCNC: 12.2 MG/L (ref 0–5)
EOSINOPHIL # BLD: 0.19 K/UL (ref 0–0.44)
EOSINOPHILS RELATIVE PERCENT: 3 % (ref 1–4)
ERYTHROCYTE [DISTWIDTH] IN BLOOD BY AUTOMATED COUNT: 13.1 % (ref 11.8–14.4)
ERYTHROCYTE [SEDIMENTATION RATE] IN BLOOD BY PHOTOMETRIC METHOD: 16 MM/HR (ref 0–20)
EST. AVERAGE GLUCOSE BLD GHB EST-MCNC: 114 MG/DL
GFR, ESTIMATED: 81 ML/MIN/1.73M2
GLUCOSE SERPL-MCNC: 86 MG/DL (ref 74–99)
GLUCOSE UR STRIP-MCNC: NEGATIVE MG/DL
HBA1C MFR BLD: 5.6 % (ref 4–6)
HCT VFR BLD AUTO: 42 % (ref 40.7–50.3)
HCV AB SERPL QL IA: NONREACTIVE
HDLC SERPL-MCNC: 32 MG/DL
HGB BLD-MCNC: 13.6 G/DL (ref 13–17)
HGB UR QL STRIP.AUTO: NEGATIVE
IMM GRANULOCYTES # BLD AUTO: 0.03 K/UL (ref 0–0.3)
IMM GRANULOCYTES NFR BLD: 0 %
KETONES UR STRIP-MCNC: NEGATIVE MG/DL
LDLC SERPL CALC-MCNC: 94 MG/DL (ref 0–100)
LEUKOCYTE ESTERASE UR QL STRIP: NEGATIVE
LYMPHOCYTES NFR BLD: 1.71 K/UL (ref 1.1–3.7)
LYMPHOCYTES RELATIVE PERCENT: 25 % (ref 24–43)
MAGNESIUM SERPL-MCNC: 2.1 MG/DL (ref 1.6–2.4)
MCH RBC QN AUTO: 29.5 PG (ref 25.2–33.5)
MCHC RBC AUTO-ENTMCNC: 32.4 G/DL (ref 28.4–34.8)
MCV RBC AUTO: 91.1 FL (ref 82.6–102.9)
MONOCYTES NFR BLD: 0.6 K/UL (ref 0.1–1.2)
MONOCYTES NFR BLD: 9 % (ref 3–12)
NEUTROPHILS NFR BLD: 62 % (ref 36–65)
NEUTS SEG NFR BLD: 4.27 K/UL (ref 1.5–8.1)
NITRITE UR QL STRIP: NEGATIVE
NRBC BLD-RTO: 0 PER 100 WBC
PH UR STRIP: 6 [PH] (ref 5–8)
PLATELET # BLD AUTO: 382 K/UL (ref 138–453)
PMV BLD AUTO: 10.4 FL (ref 8.1–13.5)
POTASSIUM SERPL-SCNC: 4.6 MMOL/L (ref 3.7–5.3)
PROT SERPL-MCNC: 7 G/DL (ref 6.6–8.7)
PROT UR STRIP-MCNC: NEGATIVE MG/DL
RBC # BLD AUTO: 4.61 M/UL (ref 4.21–5.77)
SODIUM SERPL-SCNC: 140 MMOL/L (ref 136–145)
SP GR UR STRIP: 1.02 (ref 1–1.03)
TRIGL SERPL-MCNC: 95 MG/DL
TSH SERPL DL<=0.05 MIU/L-ACNC: 2.64 UIU/ML (ref 0.27–4.2)
UROBILINOGEN UR STRIP-ACNC: NORMAL EU/DL (ref 0–1)
VLDLC SERPL CALC-MCNC: 19 MG/DL (ref 1–30)
WBC OTHER # BLD: 6.9 K/UL (ref 3.5–11.3)

## 2025-03-04 PROCEDURE — G8417 CALC BMI ABV UP PARAM F/U: HCPCS | Performed by: FAMILY MEDICINE

## 2025-03-04 PROCEDURE — 1123F ACP DISCUSS/DSCN MKR DOCD: CPT | Performed by: FAMILY MEDICINE

## 2025-03-04 PROCEDURE — 3017F COLORECTAL CA SCREEN DOC REV: CPT | Performed by: FAMILY MEDICINE

## 2025-03-04 PROCEDURE — 1036F TOBACCO NON-USER: CPT | Performed by: FAMILY MEDICINE

## 2025-03-04 PROCEDURE — 3079F DIAST BP 80-89 MM HG: CPT | Performed by: FAMILY MEDICINE

## 2025-03-04 PROCEDURE — 3074F SYST BP LT 130 MM HG: CPT | Performed by: FAMILY MEDICINE

## 2025-03-04 PROCEDURE — G8427 DOCREV CUR MEDS BY ELIG CLIN: HCPCS | Performed by: FAMILY MEDICINE

## 2025-03-04 PROCEDURE — 99214 OFFICE O/P EST MOD 30 MIN: CPT | Performed by: FAMILY MEDICINE

## 2025-03-04 RX ORDER — METOPROLOL SUCCINATE 50 MG/1
TABLET, EXTENDED RELEASE ORAL
Qty: 90 TABLET | Refills: 1 | Status: SHIPPED | OUTPATIENT
Start: 2025-03-04

## 2025-03-04 RX ORDER — MONTELUKAST SODIUM 10 MG/1
10 TABLET ORAL EVERY MORNING
Qty: 90 TABLET | Refills: 1 | Status: SHIPPED | OUTPATIENT
Start: 2025-03-04

## 2025-03-04 RX ORDER — ATORVASTATIN CALCIUM 20 MG/1
TABLET, FILM COATED ORAL
Qty: 90 TABLET | Refills: 1 | Status: SHIPPED | OUTPATIENT
Start: 2025-03-04

## 2025-03-04 RX ORDER — LEVOTHYROXINE SODIUM 125 UG/1
125 TABLET ORAL DAILY
Qty: 90 TABLET | Refills: 1 | Status: SHIPPED | OUTPATIENT
Start: 2025-03-04

## 2025-03-04 RX ORDER — VALSARTAN AND HYDROCHLOROTHIAZIDE 160; 12.5 MG/1; MG/1
TABLET, FILM COATED ORAL
Qty: 90 TABLET | Refills: 1 | Status: SHIPPED | OUTPATIENT
Start: 2025-03-04

## 2025-03-04 SDOH — ECONOMIC STABILITY: FOOD INSECURITY: WITHIN THE PAST 12 MONTHS, YOU WORRIED THAT YOUR FOOD WOULD RUN OUT BEFORE YOU GOT MONEY TO BUY MORE.: NEVER TRUE

## 2025-03-04 SDOH — ECONOMIC STABILITY: FOOD INSECURITY: WITHIN THE PAST 12 MONTHS, THE FOOD YOU BOUGHT JUST DIDN'T LAST AND YOU DIDN'T HAVE MONEY TO GET MORE.: NEVER TRUE

## 2025-03-04 ASSESSMENT — ENCOUNTER SYMPTOMS
RESPIRATORY NEGATIVE: 1
ALLERGIC/IMMUNOLOGIC NEGATIVE: 1
EYES NEGATIVE: 1
GASTROINTESTINAL NEGATIVE: 1

## 2025-03-04 ASSESSMENT — PATIENT HEALTH QUESTIONNAIRE - PHQ9
SUM OF ALL RESPONSES TO PHQ QUESTIONS 1-9: 0
2. FEELING DOWN, DEPRESSED OR HOPELESS: NOT AT ALL
1. LITTLE INTEREST OR PLEASURE IN DOING THINGS: NOT AT ALL
SUM OF ALL RESPONSES TO PHQ QUESTIONS 1-9: 0

## 2025-03-04 NOTE — PROGRESS NOTES
D 25 Hydroxy; Future  20. BMI 29.0-29.9,adult       Return in about 6 months (around 9/4/2025) for Medicare Annual Visit.  Labs/testing as ordered.  Will notify of results/recommendations when known.  AVS provided.    Over 30 minutes were spent on this patient encounter.      Electronically signed by Jesse Pastor DO on 3/4/2025 at 12:40 PM

## 2025-03-17 ENCOUNTER — RESULTS FOLLOW-UP (OUTPATIENT)
Dept: FAMILY MEDICINE CLINIC | Age: 70
End: 2025-03-17

## 2025-04-08 ENCOUNTER — TELEPHONE (OUTPATIENT)
Dept: FAMILY MEDICINE CLINIC | Age: 70
End: 2025-04-08

## 2025-04-08 NOTE — TELEPHONE ENCOUNTER
PT stopped in stating he went for the screening for [employer] and they told him that his insurance would not cover due to how the referral was written and he would have to pay upfront for this.     He would like advise on how to handle this- as he is also going to [facility] to follow up

## 2025-05-14 ENCOUNTER — OFFICE VISIT (OUTPATIENT)
Dept: CARDIOLOGY CLINIC | Age: 70
End: 2025-05-14
Payer: COMMERCIAL

## 2025-05-14 VITALS
DIASTOLIC BLOOD PRESSURE: 70 MMHG | HEIGHT: 75 IN | OXYGEN SATURATION: 98 % | WEIGHT: 243 LBS | HEART RATE: 66 BPM | BODY MASS INDEX: 30.21 KG/M2 | SYSTOLIC BLOOD PRESSURE: 130 MMHG

## 2025-05-14 DIAGNOSIS — I50.22 CHRONIC SYSTOLIC (CONGESTIVE) HEART FAILURE (HCC): ICD-10-CM

## 2025-05-14 DIAGNOSIS — I34.0 MITRAL VALVE INSUFFICIENCY, UNSPECIFIED ETIOLOGY: ICD-10-CM

## 2025-05-14 DIAGNOSIS — I51.89 LEFT VENTRICULAR SYSTOLIC DYSFUNCTION: ICD-10-CM

## 2025-05-14 DIAGNOSIS — I10 ESSENTIAL HYPERTENSION: ICD-10-CM

## 2025-05-14 DIAGNOSIS — I48.91 ATRIAL FIBRILLATION WITH RVR (HCC): Primary | ICD-10-CM

## 2025-05-14 DIAGNOSIS — E78.5 DYSLIPIDEMIA: ICD-10-CM

## 2025-05-14 DIAGNOSIS — R94.39 ABNORMAL CARDIOVASCULAR STRESS TEST: ICD-10-CM

## 2025-05-14 DIAGNOSIS — I07.1 TRICUSPID VALVE INSUFFICIENCY, UNSPECIFIED ETIOLOGY: ICD-10-CM

## 2025-05-14 DIAGNOSIS — R94.31 ABNORMAL ECG: ICD-10-CM

## 2025-05-14 PROCEDURE — 3017F COLORECTAL CA SCREEN DOC REV: CPT | Performed by: INTERNAL MEDICINE

## 2025-05-14 PROCEDURE — 1123F ACP DISCUSS/DSCN MKR DOCD: CPT | Performed by: INTERNAL MEDICINE

## 2025-05-14 PROCEDURE — 1036F TOBACCO NON-USER: CPT | Performed by: INTERNAL MEDICINE

## 2025-05-14 PROCEDURE — G8427 DOCREV CUR MEDS BY ELIG CLIN: HCPCS | Performed by: INTERNAL MEDICINE

## 2025-05-14 PROCEDURE — G8417 CALC BMI ABV UP PARAM F/U: HCPCS | Performed by: INTERNAL MEDICINE

## 2025-05-14 PROCEDURE — 93000 ELECTROCARDIOGRAM COMPLETE: CPT | Performed by: INTERNAL MEDICINE

## 2025-05-14 PROCEDURE — 3078F DIAST BP <80 MM HG: CPT | Performed by: INTERNAL MEDICINE

## 2025-05-14 PROCEDURE — 3075F SYST BP GE 130 - 139MM HG: CPT | Performed by: INTERNAL MEDICINE

## 2025-05-14 PROCEDURE — 99204 OFFICE O/P NEW MOD 45 MIN: CPT | Performed by: INTERNAL MEDICINE

## 2025-05-14 NOTE — PROGRESS NOTES
Cardiology Consultation/Follow Up.  [facility]    Walker Wing  1955  [id number]    Today: 5/14/25    CC: Patient is here to establish care for PAF, abnormal stress test.     HPI:   Walker Wing is here to establish care for PAF, abnormal stress test.   Seen by Dr. Charles in 2023- with plan for cath, was not done.   Here to establish care.   Has no cp.   Has no sob.   Has no palpitations.   Has no le edema.   Has no syncope.     Past Medical:  Past Medical History:   Diagnosis Date    Alcohol use     Atrial fibrillation with RVR (HCC) 04/06/2021    Benign prostatic hyperplasia with lower urinary tract symptoms 03/04/2025    Chronic systolic (congestive) heart failure 05/15/2023    Epistaxis 02/17/2014    Hyperlipidemia     Hypertension     Hypothyroidism 02/17/2014    Left ventricular systolic dysfunction 04/08/2021    Nevus 02/17/2014    Secondary hypercoagulable state 08/13/2024    Thyroid disease     Wears contact lenses     Wears glasses     Wellness examination     PCP Tanya Clemente MD / [facility] / last visit 10/5/22         Past Surgical:  Past Surgical History:   Procedure Laterality Date    APPENDECTOMY      CARDIOVASCULAR STRESS TEST      10-15 yrs ago    HERNIA REPAIR      PROSTATE BIOPSY      PROSTATE BIOPSY N/A 12/7/2022    FUSION PROSTATE BIOPSY, ULTRASOUND performed by Benedict Mujica MD at [facility] OR         Family History:  Family History   Problem Relation Age of Onset    Heart Disease Mother         OHS/ afib    COPD Father         lung disease    Alcohol Abuse Father        Social History:  Social History     Socioeconomic History    Marital status: Single     Spouse name: Not on file    Number of children: Not on file    Years of education: Not on file    Highest education level: Not on file   Occupational History    Not on file   Tobacco Use    Smoking status: Former     Current packs/day: 0.00     Average packs/day: 1 pack/day for 13.0 years (13.0 ttl pk-yrs)     Types:

## 2025-05-30 DIAGNOSIS — E03.9 ACQUIRED HYPOTHYROIDISM: ICD-10-CM

## 2025-05-30 DIAGNOSIS — Z87.891 FORMER SMOKER: ICD-10-CM

## 2025-05-30 DIAGNOSIS — I10 PRIMARY HYPERTENSION: ICD-10-CM

## 2025-05-30 DIAGNOSIS — I50.22 CHRONIC SYSTOLIC (CONGESTIVE) HEART FAILURE (HCC): ICD-10-CM

## 2025-05-30 DIAGNOSIS — Z76.0 ENCOUNTER FOR MEDICATION REFILL: ICD-10-CM

## 2025-06-02 RX ORDER — LEVOTHYROXINE SODIUM 125 UG/1
125 TABLET ORAL DAILY
Qty: 90 TABLET | Refills: 1 | Status: SHIPPED | OUTPATIENT
Start: 2025-06-02

## 2025-06-02 RX ORDER — METOPROLOL SUCCINATE 50 MG/1
50 TABLET, EXTENDED RELEASE ORAL DAILY
Qty: 90 TABLET | Refills: 1 | Status: SHIPPED | OUTPATIENT
Start: 2025-06-02

## 2025-06-02 RX ORDER — VALSARTAN AND HYDROCHLOROTHIAZIDE 160; 12.5 MG/1; MG/1
1 TABLET, FILM COATED ORAL DAILY
Qty: 90 TABLET | Refills: 1 | Status: SHIPPED | OUTPATIENT
Start: 2025-06-02

## 2025-06-02 RX ORDER — MONTELUKAST SODIUM 10 MG/1
10 TABLET ORAL EVERY MORNING
Qty: 90 TABLET | Refills: 1 | Status: SHIPPED | OUTPATIENT
Start: 2025-06-02

## 2025-08-14 DIAGNOSIS — E78.00 PURE HYPERCHOLESTEROLEMIA: ICD-10-CM

## 2025-08-14 DIAGNOSIS — Z76.0 ENCOUNTER FOR MEDICATION REFILL: ICD-10-CM

## 2025-08-14 RX ORDER — ATORVASTATIN CALCIUM 20 MG/1
20 TABLET, FILM COATED ORAL DAILY
Qty: 90 TABLET | Refills: 1 | Status: SHIPPED | OUTPATIENT
Start: 2025-08-14

## 2025-08-21 ENCOUNTER — HOSPITAL ENCOUNTER (OUTPATIENT)
Age: 70
Setting detail: SPECIMEN
Discharge: HOME | End: 2025-08-21

## 2025-08-21 LAB — PSA SERPL-MCNC: 3.9 NG/ML (ref 0–4)

## 2025-09-05 ENCOUNTER — OFFICE VISIT (OUTPATIENT)
Dept: FAMILY MEDICINE CLINIC | Age: 70
End: 2025-09-05
Payer: COMMERCIAL

## 2025-09-05 VITALS
HEART RATE: 76 BPM | HEIGHT: 75 IN | OXYGEN SATURATION: 96 % | SYSTOLIC BLOOD PRESSURE: 100 MMHG | DIASTOLIC BLOOD PRESSURE: 60 MMHG | WEIGHT: 234 LBS | BODY MASS INDEX: 29.09 KG/M2

## 2025-09-05 DIAGNOSIS — C44.311 BASAL CELL CARCINOMA OF NOSE: ICD-10-CM

## 2025-09-05 DIAGNOSIS — N40.1 BENIGN PROSTATIC HYPERPLASIA WITH LOWER URINARY TRACT SYMPTOMS, SYMPTOM DETAILS UNSPECIFIED: ICD-10-CM

## 2025-09-05 DIAGNOSIS — Z13.6 SCREENING FOR AAA (ABDOMINAL AORTIC ANEURYSM): ICD-10-CM

## 2025-09-05 DIAGNOSIS — I87.2 CHRONIC VENOUS INSUFFICIENCY: ICD-10-CM

## 2025-09-05 DIAGNOSIS — Z12.11 COLON CANCER SCREENING: ICD-10-CM

## 2025-09-05 DIAGNOSIS — Z09 FOLLOW-UP EXAMINATION: Primary | ICD-10-CM

## 2025-09-05 DIAGNOSIS — R97.20 ELEVATED PSA: ICD-10-CM

## 2025-09-05 DIAGNOSIS — I48.91 ATRIAL FIBRILLATION WITH RVR (HCC): ICD-10-CM

## 2025-09-05 DIAGNOSIS — E03.9 ACQUIRED HYPOTHYROIDISM: ICD-10-CM

## 2025-09-05 DIAGNOSIS — Z71.89 ENCOUNTER FOR MEDICATION REVIEW AND COUNSELING: ICD-10-CM

## 2025-09-05 DIAGNOSIS — I50.22 CHRONIC SYSTOLIC (CONGESTIVE) HEART FAILURE (HCC): ICD-10-CM

## 2025-09-05 DIAGNOSIS — I10 PRIMARY HYPERTENSION: ICD-10-CM

## 2025-09-05 DIAGNOSIS — E78.00 PURE HYPERCHOLESTEROLEMIA: ICD-10-CM

## 2025-09-05 PROCEDURE — G8417 CALC BMI ABV UP PARAM F/U: HCPCS | Performed by: FAMILY MEDICINE

## 2025-09-05 PROCEDURE — 3078F DIAST BP <80 MM HG: CPT | Performed by: FAMILY MEDICINE

## 2025-09-05 PROCEDURE — G8427 DOCREV CUR MEDS BY ELIG CLIN: HCPCS | Performed by: FAMILY MEDICINE

## 2025-09-05 PROCEDURE — 3074F SYST BP LT 130 MM HG: CPT | Performed by: FAMILY MEDICINE

## 2025-09-05 PROCEDURE — 3017F COLORECTAL CA SCREEN DOC REV: CPT | Performed by: FAMILY MEDICINE

## 2025-09-05 PROCEDURE — 1036F TOBACCO NON-USER: CPT | Performed by: FAMILY MEDICINE

## 2025-09-05 PROCEDURE — 1123F ACP DISCUSS/DSCN MKR DOCD: CPT | Performed by: FAMILY MEDICINE

## 2025-09-05 PROCEDURE — 99213 OFFICE O/P EST LOW 20 MIN: CPT | Performed by: FAMILY MEDICINE

## 2025-09-05 ASSESSMENT — ENCOUNTER SYMPTOMS
RESPIRATORY NEGATIVE: 1
ALLERGIC/IMMUNOLOGIC NEGATIVE: 1
EYES NEGATIVE: 1
GASTROINTESTINAL NEGATIVE: 1

## (undated) DEVICE — MAX-CORE® DISPOSABLE CORE BIOPSY INSTRUMENT, 18G X 25CM: Brand: MAX-CORE

## (undated) DEVICE — CATHETER PTCA L135CM L2CM OD5.3FR ODSEC5MM .035IN 12ATM